# Patient Record
Sex: MALE | Race: WHITE | Employment: OTHER | URBAN - NONMETROPOLITAN AREA
[De-identification: names, ages, dates, MRNs, and addresses within clinical notes are randomized per-mention and may not be internally consistent; named-entity substitution may affect disease eponyms.]

---

## 2017-01-24 ENCOUNTER — OFFICE VISIT (OUTPATIENT)
Dept: PULMONOLOGY | Age: 62
End: 2017-01-24

## 2017-01-24 VITALS
BODY MASS INDEX: 33.44 KG/M2 | HEART RATE: 67 BPM | OXYGEN SATURATION: 97 % | HEIGHT: 69 IN | TEMPERATURE: 97.9 F | WEIGHT: 225.8 LBS | SYSTOLIC BLOOD PRESSURE: 114 MMHG | DIASTOLIC BLOOD PRESSURE: 76 MMHG

## 2017-01-24 DIAGNOSIS — J43.2 CENTRILOBULAR EMPHYSEMA (HCC): Primary | ICD-10-CM

## 2017-01-24 DIAGNOSIS — C34.91 SQUAMOUS CELL CARCINOMA OF RIGHT LUNG (HCC): ICD-10-CM

## 2017-01-24 PROCEDURE — 99213 OFFICE O/P EST LOW 20 MIN: CPT | Performed by: PHYSICIAN ASSISTANT

## 2017-01-24 RX ORDER — FLUTICASONE FUROATE AND VILANTEROL 200; 25 UG/1; UG/1
1 POWDER RESPIRATORY (INHALATION) DAILY
Qty: 1 EACH | Refills: 11 | Status: SHIPPED | OUTPATIENT
Start: 2017-01-24 | End: 2017-04-06

## 2017-01-24 ASSESSMENT — ENCOUNTER SYMPTOMS
COUGH: 0
GASTROINTESTINAL NEGATIVE: 1
EYES NEGATIVE: 1
SHORTNESS OF BREATH: 1

## 2017-02-10 ENCOUNTER — OFFICE VISIT (OUTPATIENT)
Dept: FAMILY MEDICINE CLINIC | Age: 62
End: 2017-02-10

## 2017-02-10 VITALS
OXYGEN SATURATION: 96 % | WEIGHT: 227.6 LBS | RESPIRATION RATE: 16 BRPM | TEMPERATURE: 97.5 F | HEIGHT: 67 IN | HEART RATE: 64 BPM | BODY MASS INDEX: 35.72 KG/M2 | DIASTOLIC BLOOD PRESSURE: 86 MMHG | SYSTOLIC BLOOD PRESSURE: 136 MMHG

## 2017-02-10 DIAGNOSIS — I10 ESSENTIAL HYPERTENSION: ICD-10-CM

## 2017-02-10 DIAGNOSIS — L30.9 DERMATITIS: Primary | ICD-10-CM

## 2017-02-10 DIAGNOSIS — L85.3 XEROSIS OF SKIN: ICD-10-CM

## 2017-02-10 DIAGNOSIS — J44.9 CHRONIC OBSTRUCTIVE PULMONARY DISEASE, UNSPECIFIED COPD TYPE (HCC): ICD-10-CM

## 2017-02-10 PROCEDURE — 99213 OFFICE O/P EST LOW 20 MIN: CPT | Performed by: FAMILY MEDICINE

## 2017-02-10 PROCEDURE — 96372 THER/PROPH/DIAG INJ SC/IM: CPT | Performed by: FAMILY MEDICINE

## 2017-02-10 RX ORDER — LISINOPRIL 10 MG/1
10 TABLET ORAL DAILY
Qty: 30 TABLET | Refills: 11 | Status: SHIPPED | OUTPATIENT
Start: 2017-02-10 | End: 2018-02-20 | Stop reason: SDUPTHER

## 2017-02-10 RX ORDER — TIOTROPIUM BROMIDE INHALATION SPRAY 3.12 UG/1
SPRAY, METERED RESPIRATORY (INHALATION)
Qty: 1 INHALER | Refills: 11 | Status: SHIPPED | OUTPATIENT
Start: 2017-02-10 | End: 2018-02-20 | Stop reason: SDUPTHER

## 2017-02-10 RX ORDER — METHYLPREDNISOLONE ACETATE 40 MG/ML
40 INJECTION, SUSPENSION INTRA-ARTICULAR; INTRALESIONAL; INTRAMUSCULAR; SOFT TISSUE ONCE
Status: COMPLETED | OUTPATIENT
Start: 2017-02-10 | End: 2017-02-10

## 2017-02-10 RX ORDER — ALBUTEROL SULFATE 90 UG/1
2 AEROSOL, METERED RESPIRATORY (INHALATION) EVERY 6 HOURS PRN
Qty: 1 INHALER | Refills: 5 | Status: SHIPPED | OUTPATIENT
Start: 2017-02-10 | End: 2018-02-20 | Stop reason: SDUPTHER

## 2017-02-10 RX ORDER — LISINOPRIL 10 MG/1
10 TABLET ORAL DAILY
Qty: 30 TABLET | Refills: 11 | Status: CANCELLED | OUTPATIENT
Start: 2017-02-10

## 2017-02-10 RX ORDER — FLUOCINONIDE 0.5 MG/G
OINTMENT TOPICAL
Qty: 30 G | Refills: 1 | Status: SHIPPED | OUTPATIENT
Start: 2017-02-10 | End: 2018-02-20 | Stop reason: SDUPTHER

## 2017-02-10 RX ORDER — METHYLPREDNISOLONE ACETATE 80 MG/ML
80 INJECTION, SUSPENSION INTRA-ARTICULAR; INTRALESIONAL; INTRAMUSCULAR; SOFT TISSUE ONCE
Status: COMPLETED | OUTPATIENT
Start: 2017-02-10 | End: 2017-02-10

## 2017-02-10 RX ADMIN — METHYLPREDNISOLONE ACETATE 40 MG: 40 INJECTION, SUSPENSION INTRA-ARTICULAR; INTRALESIONAL; INTRAMUSCULAR; SOFT TISSUE at 11:40

## 2017-02-10 RX ADMIN — METHYLPREDNISOLONE ACETATE 80 MG: 80 INJECTION, SUSPENSION INTRA-ARTICULAR; INTRALESIONAL; INTRAMUSCULAR; SOFT TISSUE at 11:41

## 2017-02-10 ASSESSMENT — ENCOUNTER SYMPTOMS
RESPIRATORY NEGATIVE: 1
GASTROINTESTINAL NEGATIVE: 1

## 2017-04-06 ENCOUNTER — OFFICE VISIT (OUTPATIENT)
Dept: PULMONOLOGY | Age: 62
End: 2017-04-06

## 2017-04-06 VITALS
WEIGHT: 233 LBS | HEART RATE: 66 BPM | BODY MASS INDEX: 35.31 KG/M2 | SYSTOLIC BLOOD PRESSURE: 124 MMHG | TEMPERATURE: 97.1 F | OXYGEN SATURATION: 97 % | HEIGHT: 68 IN | DIASTOLIC BLOOD PRESSURE: 82 MMHG

## 2017-04-06 DIAGNOSIS — C34.90 NON-SMALL CELL LUNG CANCER (NSCLC) (HCC): ICD-10-CM

## 2017-04-06 DIAGNOSIS — J44.9 CHRONIC OBSTRUCTIVE PULMONARY DISEASE, UNSPECIFIED COPD TYPE (HCC): Primary | ICD-10-CM

## 2017-04-06 PROCEDURE — 99213 OFFICE O/P EST LOW 20 MIN: CPT | Performed by: INTERNAL MEDICINE

## 2017-09-11 ENCOUNTER — HOSPITAL ENCOUNTER (OUTPATIENT)
Dept: CT IMAGING | Age: 62
Discharge: HOME | End: 2017-09-11
Payer: COMMERCIAL

## 2017-09-11 ENCOUNTER — HOSPITAL ENCOUNTER (OUTPATIENT)
Age: 62
Discharge: HOME | End: 2017-09-11
Payer: COMMERCIAL

## 2017-09-11 DIAGNOSIS — C34.11 MALIGNANT NEOPLASM OF UPPER LOBE OF RIGHT LUNG (HCC): ICD-10-CM

## 2017-09-11 LAB
ALBUMIN SERPL-MCNC: 4.6 G/DL (ref 3.5–5.1)
ALP BLD-CCNC: 82 U/L (ref 38–126)
ALT SERPL-CCNC: 57 U/L (ref 11–66)
ANION GAP SERPL CALCULATED.3IONS-SCNC: 13 MEQ/L (ref 8–16)
ANISOCYTOSIS: ABNORMAL
AST SERPL-CCNC: 41 U/L (ref 5–40)
BASOPHILS # BLD: 0.6 %
BASOPHILS ABSOLUTE: 0 THOU/MM3 (ref 0–0.1)
BILIRUB SERPL-MCNC: 0.6 MG/DL (ref 0.3–1.2)
BUN BLDV-MCNC: 8 MG/DL (ref 7–22)
CALCIUM SERPL-MCNC: 9.8 MG/DL (ref 8.5–10.5)
CEA: 2.3 NG/ML (ref 0–5)
CHLORIDE BLD-SCNC: 99 MEQ/L (ref 98–111)
CO2: 28 MEQ/L (ref 23–33)
CREAT SERPL-MCNC: 1.2 MG/DL (ref 0.4–1.2)
EOSINOPHIL # BLD: 1.6 %
EOSINOPHILS ABSOLUTE: 0.1 THOU/MM3 (ref 0–0.4)
GFR SERPL CREATININE-BSD FRML MDRD: 62 ML/MIN/1.73M2
GLUCOSE BLD-MCNC: 133 MG/DL (ref 70–108)
HCT VFR BLD CALC: 51.4 % (ref 42–52)
HEMOGLOBIN: 17.4 GM/DL (ref 14–18)
LYMPHOCYTES # BLD: 31.9 %
LYMPHOCYTES ABSOLUTE: 2.1 THOU/MM3 (ref 1–4.8)
MACROCYTES: ABNORMAL
MCH RBC QN AUTO: 34 PG (ref 27–31)
MCHC RBC AUTO-ENTMCNC: 33.9 GM/DL (ref 33–37)
MCV RBC AUTO: 100.3 FL (ref 80–94)
MONOCYTES # BLD: 8.6 %
MONOCYTES ABSOLUTE: 0.6 THOU/MM3 (ref 0.4–1.3)
NUCLEATED RED BLOOD CELLS: 0 /100 WBC
PDW BLD-RTO: 14.6 % (ref 11.5–14.5)
PLATELET # BLD: 225 THOU/MM3 (ref 130–400)
PMV BLD AUTO: 7.5 MCM (ref 7.4–10.4)
POTASSIUM SERPL-SCNC: 4.8 MEQ/L (ref 3.5–5.2)
RBC # BLD: 5.12 MILL/MM3 (ref 4.7–6.1)
RBC # BLD: NORMAL 10*6/UL
SEG NEUTROPHILS: 57.3 %
SEGMENTED NEUTROPHILS ABSOLUTE COUNT: 3.8 THOU/MM3 (ref 1.8–7.7)
SODIUM BLD-SCNC: 140 MEQ/L (ref 135–145)
TOTAL PROTEIN: 7.5 G/DL (ref 6.1–8)
WBC # BLD: 6.7 THOU/MM3 (ref 4.8–10.8)

## 2017-09-11 PROCEDURE — 85025 COMPLETE CBC W/AUTO DIFF WBC: CPT

## 2017-09-11 PROCEDURE — 6360000004 HC RX CONTRAST MEDICATION: Performed by: INTERNAL MEDICINE

## 2017-09-11 PROCEDURE — 82378 CARCINOEMBRYONIC ANTIGEN: CPT

## 2017-09-11 PROCEDURE — 80053 COMPREHEN METABOLIC PANEL: CPT

## 2017-09-11 PROCEDURE — 71260 CT THORAX DX C+: CPT

## 2017-09-11 PROCEDURE — 36415 COLL VENOUS BLD VENIPUNCTURE: CPT

## 2017-09-11 RX ADMIN — IOPAMIDOL 85 ML: 755 INJECTION, SOLUTION INTRAVENOUS at 09:28

## 2017-09-19 ENCOUNTER — TELEPHONE (OUTPATIENT)
Dept: FAMILY MEDICINE CLINIC | Age: 62
End: 2017-09-19

## 2017-09-19 DIAGNOSIS — M10.9 ACUTE GOUT, UNSPECIFIED CAUSE, UNSPECIFIED SITE: ICD-10-CM

## 2017-09-19 RX ORDER — INDOMETHACIN 50 MG/1
50 CAPSULE ORAL 3 TIMES DAILY PRN
Qty: 30 CAPSULE | Refills: 0 | Status: SHIPPED | OUTPATIENT
Start: 2017-09-19 | End: 2018-05-10 | Stop reason: SDUPTHER

## 2018-02-20 ENCOUNTER — OFFICE VISIT (OUTPATIENT)
Dept: FAMILY MEDICINE CLINIC | Age: 63
End: 2018-02-20
Payer: COMMERCIAL

## 2018-02-20 VITALS
HEIGHT: 69 IN | RESPIRATION RATE: 14 BRPM | HEART RATE: 82 BPM | WEIGHT: 224 LBS | SYSTOLIC BLOOD PRESSURE: 112 MMHG | DIASTOLIC BLOOD PRESSURE: 82 MMHG | BODY MASS INDEX: 33.18 KG/M2 | OXYGEN SATURATION: 96 %

## 2018-02-20 DIAGNOSIS — J44.9 CHRONIC OBSTRUCTIVE PULMONARY DISEASE, UNSPECIFIED COPD TYPE (HCC): ICD-10-CM

## 2018-02-20 DIAGNOSIS — E78.6 LOW HDL (UNDER 40): ICD-10-CM

## 2018-02-20 DIAGNOSIS — Z12.5 SCREENING FOR PROSTATE CANCER: ICD-10-CM

## 2018-02-20 DIAGNOSIS — I10 ESSENTIAL HYPERTENSION: ICD-10-CM

## 2018-02-20 DIAGNOSIS — F41.8 SITUATIONAL ANXIETY: ICD-10-CM

## 2018-02-20 DIAGNOSIS — C34.91 SQUAMOUS CELL CARCINOMA OF RIGHT LUNG (HCC): ICD-10-CM

## 2018-02-20 DIAGNOSIS — L30.9 DERMATITIS: ICD-10-CM

## 2018-02-20 DIAGNOSIS — R73.01 IFG (IMPAIRED FASTING GLUCOSE): Primary | ICD-10-CM

## 2018-02-20 PROCEDURE — 99214 OFFICE O/P EST MOD 30 MIN: CPT | Performed by: FAMILY MEDICINE

## 2018-02-20 RX ORDER — LISINOPRIL 10 MG/1
10 TABLET ORAL DAILY
Qty: 30 TABLET | Refills: 11 | Status: SHIPPED | OUTPATIENT
Start: 2018-02-20 | End: 2019-02-12 | Stop reason: SDUPTHER

## 2018-02-20 RX ORDER — FLUOCINONIDE 0.5 MG/G
OINTMENT TOPICAL
Qty: 30 G | Refills: 1 | Status: SHIPPED | OUTPATIENT
Start: 2018-02-20 | End: 2018-02-27

## 2018-02-20 RX ORDER — TIOTROPIUM BROMIDE INHALATION SPRAY 3.12 UG/1
SPRAY, METERED RESPIRATORY (INHALATION)
Qty: 1 INHALER | Refills: 11 | Status: SHIPPED | OUTPATIENT
Start: 2018-02-20 | End: 2019-02-12 | Stop reason: SDUPTHER

## 2018-02-20 RX ORDER — ALBUTEROL SULFATE 90 UG/1
2 AEROSOL, METERED RESPIRATORY (INHALATION) EVERY 6 HOURS PRN
Qty: 1 INHALER | Refills: 5 | Status: SHIPPED | OUTPATIENT
Start: 2018-02-20 | End: 2019-03-21 | Stop reason: SDUPTHER

## 2018-02-20 ASSESSMENT — ENCOUNTER SYMPTOMS
RESPIRATORY NEGATIVE: 1
GASTROINTESTINAL NEGATIVE: 1

## 2018-02-20 ASSESSMENT — PATIENT HEALTH QUESTIONNAIRE - PHQ9
SUM OF ALL RESPONSES TO PHQ9 QUESTIONS 1 & 2: 0
SUM OF ALL RESPONSES TO PHQ QUESTIONS 1-9: 0
1. LITTLE INTEREST OR PLEASURE IN DOING THINGS: 0
2. FEELING DOWN, DEPRESSED OR HOPELESS: 0

## 2018-02-20 NOTE — PROGRESS NOTES
Visit Information    Have you changed or started any medications since your last visit including any over-the-counter medicines, vitamins, or herbal medicines? no   Have you stopped taking any of your medications? Is so, why? -  no  Are you having any side effects from any of your medications? - no    Have you seen any other physician or provider since your last visit? yes - Dr Shahid   Have you had any other diagnostic tests since your last visit? yes -  CT of chest, labs   Have you been seen in the emergency room and/or had an admission in a hospital since we last saw you?  no   Have you had your routine dental cleaning in the past 6 months?  no dentures    Do you have an active MyChart account? If no, what is the barrier?   No: pt refused    Patient Care Team:  Serina Donovan DO as PCP - General (Family Medicine)  Jonathan Negron MD as Consulting Physician (Pulmonary Disease)  Dominga Tang MD as Consulting Physician (Cardiology)  Hema Lopes MD as Surgeon (Cardiothoracic Surgery)  Salma Díaz DO as Physician (Hematology and Oncology)    Medical History Review  Past Medical, Family, and Social History reviewed and does contribute to the patient presenting condition    Health Maintenance   Topic Date Due    Hepatitis C screen  1955    HIV screen  09/26/1972    DTaP/Tdap/Td vaccine (1 - Tdap) 09/26/1976    Pneumococcal med risk (1 of 1 - PPSV23) 09/26/1976    A1C test (Diabetic or Prediabetic)  02/27/2015    Flu vaccine (1) 09/01/2017    Zostavax vaccine  09/26/2017    Lipid screen  02/06/2021    Colon cancer screen colonoscopy  04/02/2025

## 2018-03-12 ENCOUNTER — HOSPITAL ENCOUNTER (OUTPATIENT)
Dept: CT IMAGING | Age: 63
Discharge: HOME | End: 2018-03-12
Payer: COMMERCIAL

## 2018-03-12 ENCOUNTER — HOSPITAL ENCOUNTER (OUTPATIENT)
Age: 63
Discharge: HOME | End: 2018-03-12
Payer: COMMERCIAL

## 2018-03-12 DIAGNOSIS — C34.11 MALIGNANT NEOPLASM OF UPPER LOBE OF RIGHT LUNG (HCC): ICD-10-CM

## 2018-03-12 LAB
ALBUMIN SERPL-MCNC: 4.3 G/DL (ref 3.5–5.1)
ALP BLD-CCNC: 91 U/L (ref 38–126)
ALT SERPL-CCNC: 34 U/L (ref 11–66)
ANION GAP SERPL CALCULATED.3IONS-SCNC: 15 MEQ/L (ref 8–16)
AST SERPL-CCNC: 28 U/L (ref 5–40)
BASOPHILS # BLD: 1 %
BASOPHILS ABSOLUTE: 0.1 THOU/MM3 (ref 0–0.1)
BILIRUB SERPL-MCNC: 0.5 MG/DL (ref 0.3–1.2)
BUN BLDV-MCNC: 8 MG/DL (ref 7–22)
CALCIUM SERPL-MCNC: 9.8 MG/DL (ref 8.5–10.5)
CEA: 2.6 NG/ML (ref 0–5)
CHLORIDE BLD-SCNC: 100 MEQ/L (ref 98–111)
CO2: 24 MEQ/L (ref 23–33)
CREAT SERPL-MCNC: 1.1 MG/DL (ref 0.4–1.2)
EOSINOPHIL # BLD: 2.2 %
EOSINOPHILS ABSOLUTE: 0.2 THOU/MM3 (ref 0–0.4)
GFR SERPL CREATININE-BSD FRML MDRD: 68 ML/MIN/1.73M2
GLUCOSE BLD-MCNC: 136 MG/DL (ref 70–108)
HCT VFR BLD CALC: 48.2 % (ref 42–52)
HEMOGLOBIN: 16.6 GM/DL (ref 14–18)
LYMPHOCYTES # BLD: 29.2 %
LYMPHOCYTES ABSOLUTE: 2.4 THOU/MM3 (ref 1–4.8)
MCH RBC QN AUTO: 33.9 PG (ref 27–31)
MCHC RBC AUTO-ENTMCNC: 34.4 GM/DL (ref 33–37)
MCV RBC AUTO: 98.5 FL (ref 80–94)
MONOCYTES # BLD: 9.1 %
MONOCYTES ABSOLUTE: 0.8 THOU/MM3 (ref 0.4–1.3)
NUCLEATED RED BLOOD CELLS: 0 /100 WBC
PDW BLD-RTO: 14.1 % (ref 11.5–14.5)
PLATELET # BLD: 233 THOU/MM3 (ref 130–400)
PMV BLD AUTO: 7.9 FL (ref 7.4–10.4)
POC CREATININE WHOLE BLOOD: 1.2 MG/DL (ref 0.5–1.2)
POTASSIUM SERPL-SCNC: 4.3 MEQ/L (ref 3.5–5.2)
RBC # BLD: 4.89 MILL/MM3 (ref 4.7–6.1)
SEG NEUTROPHILS: 58.5 %
SEGMENTED NEUTROPHILS ABSOLUTE COUNT: 4.9 THOU/MM3 (ref 1.8–7.7)
SODIUM BLD-SCNC: 139 MEQ/L (ref 135–145)
TOTAL PROTEIN: 7.7 G/DL (ref 6.1–8)
WBC # BLD: 8.3 THOU/MM3 (ref 4.8–10.8)

## 2018-03-12 PROCEDURE — 6360000004 HC RX CONTRAST MEDICATION: Performed by: INTERNAL MEDICINE

## 2018-03-12 PROCEDURE — 82565 ASSAY OF CREATININE: CPT

## 2018-03-12 PROCEDURE — 82378 CARCINOEMBRYONIC ANTIGEN: CPT

## 2018-03-12 PROCEDURE — 85025 COMPLETE CBC W/AUTO DIFF WBC: CPT

## 2018-03-12 PROCEDURE — 36415 COLL VENOUS BLD VENIPUNCTURE: CPT

## 2018-03-12 PROCEDURE — 80053 COMPREHEN METABOLIC PANEL: CPT

## 2018-03-12 PROCEDURE — 71260 CT THORAX DX C+: CPT

## 2018-03-12 RX ADMIN — IOPAMIDOL 85 ML: 755 INJECTION, SOLUTION INTRAVENOUS at 07:12

## 2018-04-05 ENCOUNTER — HOSPITAL ENCOUNTER (OUTPATIENT)
Dept: PULMONOLOGY | Age: 63
Discharge: HOME | End: 2018-04-05
Payer: COMMERCIAL

## 2018-04-05 DIAGNOSIS — J44.9 CHRONIC OBSTRUCTIVE PULMONARY DISEASE, UNSPECIFIED COPD TYPE (HCC): ICD-10-CM

## 2018-04-05 DIAGNOSIS — C34.11 MALIGNANT NEOPLASM OF UPPER LOBE, RIGHT BRONCHUS OR LUNG (HCC): ICD-10-CM

## 2018-04-05 PROCEDURE — 94060 EVALUATION OF WHEEZING: CPT

## 2018-04-06 LAB
AVERAGE GLUCOSE: NORMAL
CHOLESTEROL, TOTAL: 208 MG/DL
CHOLESTEROL/HDL RATIO: ABNORMAL
HBA1C MFR BLD: 5.8 %
HDLC SERPL-MCNC: 30 MG/DL (ref 35–70)
LDL CHOLESTEROL CALCULATED: 134 MG/DL (ref 0–160)
TRIGL SERPL-MCNC: 218 MG/DL
TSH SERPL DL<=0.05 MIU/L-ACNC: 2.71 UIU/ML
VLDLC SERPL CALC-MCNC: 44 MG/DL

## 2018-04-12 ENCOUNTER — OFFICE VISIT (OUTPATIENT)
Dept: PULMONOLOGY | Age: 63
End: 2018-04-12
Payer: COMMERCIAL

## 2018-04-12 VITALS
OXYGEN SATURATION: 95 % | DIASTOLIC BLOOD PRESSURE: 70 MMHG | WEIGHT: 220 LBS | TEMPERATURE: 98 F | HEART RATE: 64 BPM | HEIGHT: 69 IN | BODY MASS INDEX: 32.58 KG/M2 | SYSTOLIC BLOOD PRESSURE: 106 MMHG

## 2018-04-12 DIAGNOSIS — C34.91 NON-SMALL CELL CANCER OF RIGHT LUNG (HCC): ICD-10-CM

## 2018-04-12 DIAGNOSIS — J44.9 CHRONIC OBSTRUCTIVE PULMONARY DISEASE, UNSPECIFIED COPD TYPE (HCC): Primary | ICD-10-CM

## 2018-04-12 PROCEDURE — 99213 OFFICE O/P EST LOW 20 MIN: CPT | Performed by: INTERNAL MEDICINE

## 2018-04-18 ENCOUNTER — OFFICE VISIT (OUTPATIENT)
Dept: FAMILY MEDICINE CLINIC | Age: 63
End: 2018-04-18
Payer: COMMERCIAL

## 2018-04-18 VITALS
OXYGEN SATURATION: 97 % | WEIGHT: 221.2 LBS | TEMPERATURE: 97.7 F | HEIGHT: 69 IN | SYSTOLIC BLOOD PRESSURE: 118 MMHG | DIASTOLIC BLOOD PRESSURE: 78 MMHG | BODY MASS INDEX: 32.76 KG/M2 | RESPIRATION RATE: 13 BRPM | HEART RATE: 72 BPM

## 2018-04-18 DIAGNOSIS — I10 ESSENTIAL HYPERTENSION: ICD-10-CM

## 2018-04-18 DIAGNOSIS — M25.432 PAIN AND SWELLING OF LEFT WRIST: Primary | ICD-10-CM

## 2018-04-18 DIAGNOSIS — E78.49 OTHER HYPERLIPIDEMIA: ICD-10-CM

## 2018-04-18 DIAGNOSIS — J44.9 CHRONIC OBSTRUCTIVE PULMONARY DISEASE, UNSPECIFIED COPD TYPE (HCC): ICD-10-CM

## 2018-04-18 DIAGNOSIS — E78.6 LOW HDL (UNDER 40): ICD-10-CM

## 2018-04-18 DIAGNOSIS — M10.032 ACUTE IDIOPATHIC GOUT OF LEFT WRIST: ICD-10-CM

## 2018-04-18 DIAGNOSIS — M25.532 PAIN AND SWELLING OF LEFT WRIST: Primary | ICD-10-CM

## 2018-04-18 DIAGNOSIS — R73.01 IFG (IMPAIRED FASTING GLUCOSE): ICD-10-CM

## 2018-04-18 PROCEDURE — 99214 OFFICE O/P EST MOD 30 MIN: CPT | Performed by: FAMILY MEDICINE

## 2018-04-18 RX ORDER — METHYLPREDNISOLONE 4 MG/1
TABLET ORAL
Qty: 1 KIT | Refills: 0 | Status: SHIPPED | OUTPATIENT
Start: 2018-04-18 | End: 2018-04-24

## 2018-04-18 RX ORDER — ATORVASTATIN CALCIUM 20 MG/1
20 TABLET, FILM COATED ORAL NIGHTLY
Qty: 30 TABLET | Refills: 2 | Status: SHIPPED | OUTPATIENT
Start: 2018-04-18 | End: 2018-07-18 | Stop reason: SDUPTHER

## 2018-04-18 ASSESSMENT — ENCOUNTER SYMPTOMS
GASTROINTESTINAL NEGATIVE: 1
RESPIRATORY NEGATIVE: 1

## 2018-05-10 DIAGNOSIS — M10.9 ACUTE GOUT, UNSPECIFIED CAUSE, UNSPECIFIED SITE: ICD-10-CM

## 2018-05-10 RX ORDER — INDOMETHACIN 50 MG/1
50 CAPSULE ORAL 3 TIMES DAILY PRN
Qty: 30 CAPSULE | Refills: 0 | Status: SHIPPED | OUTPATIENT
Start: 2018-05-10 | End: 2019-03-21 | Stop reason: ALTCHOICE

## 2018-06-12 LAB
CHOLESTEROL/HDL RATIO: 4
CHOLESTEROL: 129 MG/DL
HDLC SERPL-MCNC: 32 MG/DL
LDL CHOLESTEROL CALCULATED: 66 MG/DL
LDL/HDL RATIO: 2.1
TRIGL SERPL-MCNC: 154 MG/DL
URIC ACID: 9.5 MG/DL (ref 3.7–8)
VLDLC SERPL CALC-MCNC: 31 MG/DL

## 2018-07-18 ENCOUNTER — OFFICE VISIT (OUTPATIENT)
Dept: FAMILY MEDICINE CLINIC | Age: 63
End: 2018-07-18
Payer: COMMERCIAL

## 2018-07-18 VITALS
WEIGHT: 213.6 LBS | BODY MASS INDEX: 32.37 KG/M2 | HEART RATE: 56 BPM | HEIGHT: 68 IN | DIASTOLIC BLOOD PRESSURE: 66 MMHG | SYSTOLIC BLOOD PRESSURE: 112 MMHG | OXYGEN SATURATION: 97 % | RESPIRATION RATE: 12 BRPM

## 2018-07-18 DIAGNOSIS — J44.9 CHRONIC OBSTRUCTIVE PULMONARY DISEASE, UNSPECIFIED COPD TYPE (HCC): ICD-10-CM

## 2018-07-18 DIAGNOSIS — F41.8 SITUATIONAL ANXIETY: ICD-10-CM

## 2018-07-18 DIAGNOSIS — E78.6 LOW HDL (UNDER 40): ICD-10-CM

## 2018-07-18 DIAGNOSIS — I10 ESSENTIAL HYPERTENSION: ICD-10-CM

## 2018-07-18 DIAGNOSIS — R73.01 IFG (IMPAIRED FASTING GLUCOSE): ICD-10-CM

## 2018-07-18 DIAGNOSIS — E78.49 OTHER HYPERLIPIDEMIA: Primary | ICD-10-CM

## 2018-07-18 DIAGNOSIS — M10.00 IDIOPATHIC GOUT, UNSPECIFIED CHRONICITY, UNSPECIFIED SITE: ICD-10-CM

## 2018-07-18 PROCEDURE — 99214 OFFICE O/P EST MOD 30 MIN: CPT | Performed by: FAMILY MEDICINE

## 2018-07-18 RX ORDER — ALLOPURINOL 100 MG/1
100 TABLET ORAL DAILY
Qty: 30 TABLET | Refills: 11 | Status: SHIPPED | OUTPATIENT
Start: 2018-07-18 | End: 2019-07-06 | Stop reason: SDUPTHER

## 2018-07-18 RX ORDER — PREDNISONE 20 MG/1
20 TABLET ORAL DAILY
Qty: 10 TABLET | Refills: 0 | Status: SHIPPED | OUTPATIENT
Start: 2018-07-18 | End: 2018-07-28

## 2018-07-18 RX ORDER — ATORVASTATIN CALCIUM 20 MG/1
20 TABLET, FILM COATED ORAL NIGHTLY
Qty: 30 TABLET | Refills: 11 | Status: SHIPPED | OUTPATIENT
Start: 2018-07-18 | End: 2019-07-07 | Stop reason: SDUPTHER

## 2018-07-18 ASSESSMENT — ENCOUNTER SYMPTOMS
GASTROINTESTINAL NEGATIVE: 1
RESPIRATORY NEGATIVE: 1

## 2018-07-18 NOTE — PROGRESS NOTES
Visit Information    Have you changed or started any medications since your last visit including any over-the-counter medicines, vitamins, or herbal medicines? no   Are you having any side effects from any of your medications? -  no  Have you stopped taking any of your medications? Is so, why? -  no    Have you seen any other physician or provider since your last visit? No  Have you had any other diagnostic tests since your last visit? Yes - Records Obtained  Have you been seen in the emergency room and/or had an admission to a hospital since we last saw you? No  Have you had your routine dental cleaning in the past 6 months? no    Have you activated your [employer] account? If not, what are your barriers?  No: does not use a computer     Patient Care Team:  Otto Angeles DO as PCP - General (Family Medicine)  Pernell Massey MD as Consulting Physician (Pulmonary Disease)  Amparo Zazueta MD as Consulting Physician (Cardiology)  Thelma Dawkins MD as Surgeon (Cardiothoracic Surgery)  Kaylee Gatica DO as Physician (Hematology and Oncology)    Medical History Review  Past Medical, Family, and Social History reviewed and does contribute to the patient presenting condition    Health Maintenance   Topic Date Due    Hepatitis C screen  1955    HIV screen  09/26/1972    DTaP/Tdap/Td vaccine (1 - Tdap) 09/26/1976    Shingles Vaccine (1 of 2 - 2 Dose Series) 09/26/2007    Flu vaccine (1) 09/01/2018    Potassium monitoring  03/12/2019    Creatinine monitoring  03/12/2019    A1C test (Diabetic or Prediabetic)  04/06/2019    Lipid screen  06/11/2023    Colon cancer screen colonoscopy  04/02/2025    Pneumococcal med risk  Completed
Respiratory: Negative. Cardiovascular: Negative. Gastrointestinal: Negative. Musculoskeletal: Positive for arthralgias and joint swelling. All other systems reviewed and are negative. Objective:   Physical Exam   Constitutional: He is oriented to person, place, and time. He appears well-developed and well-nourished. HENT:   Head: Normocephalic and atraumatic. Right Ear: Tympanic membrane normal.   Left Ear: Tympanic membrane normal.   Mouth/Throat: Oropharynx is clear and moist and mucous membranes are normal.   Neck: Carotid bruit is not present. Cardiovascular: Normal rate, regular rhythm and normal heart sounds. No murmur heard. Pulmonary/Chest: Effort normal and breath sounds normal.   Abdominal: Soft. Bowel sounds are normal.   Musculoskeletal: He exhibits no edema. Neurological: He is alert and oriented to person, place, and time. Skin: Skin is warm and dry. Psychiatric: He has a normal mood and affect. His behavior is normal.   Nursing note and vitals reviewed. Assessment:       Diagnosis Orders   1. Other hyperlipidemia  atorvastatin (LIPITOR) 20 MG tablet   2. Low HDL (under 40)     3. Essential hypertension     4. IFG (impaired fasting glucose)     5. Chronic obstructive pulmonary disease, unspecified COPD type ([facility] [address]     6. Idiopathic gout, unspecified chronicity, unspecified site  allopurinol (ZYLOPRIM) 100 MG tablet    predniSONE (DELTASONE) 20 MG tablet    Uric Acid   7.  Situational anxiety             Plan:      -  Chronic medical problems stable  -  Continue current medications  -  Follow up with specialists as scheduled  -  Labs reviewed, start Allopurinol with 10 day prednisone bridge  -  Recheck labs 3 mos  -  RTO 3 mos

## 2018-08-21 ENCOUNTER — TELEPHONE (OUTPATIENT)
Dept: FAMILY MEDICINE CLINIC | Age: 63
End: 2018-08-21

## 2018-08-21 RX ORDER — METHYLPREDNISOLONE 4 MG/1
TABLET ORAL
Qty: 1 KIT | Refills: 0 | Status: SHIPPED | OUTPATIENT
Start: 2018-08-21 | End: 2018-08-27

## 2018-08-21 NOTE — TELEPHONE ENCOUNTER
Pt still has wrist swelling & pain from gout. Also complaints of left chest/side discomfort, states it hurts to take a deep breath and eyes are watering/itching. He wants to know if symptoms could be a side effect of the gout medication. Pt is not scheduled for return appt until 10/18.  Please advise

## 2018-09-10 ENCOUNTER — HOSPITAL ENCOUNTER (OUTPATIENT)
Age: 63
Discharge: HOME | End: 2018-09-10
Payer: MEDICARE

## 2018-09-10 ENCOUNTER — HOSPITAL ENCOUNTER (OUTPATIENT)
Dept: CT IMAGING | Age: 63
Discharge: HOME | End: 2018-09-10
Payer: MEDICARE

## 2018-09-10 DIAGNOSIS — C34.11 MALIGNANT NEOPLASM OF UPPER LOBE OF RIGHT LUNG (HCC): ICD-10-CM

## 2018-09-10 LAB
ALBUMIN SERPL-MCNC: 4.6 G/DL (ref 3.5–5.1)
ALP BLD-CCNC: 111 U/L (ref 38–126)
ALT SERPL-CCNC: 33 U/L (ref 11–66)
ANION GAP SERPL CALCULATED.3IONS-SCNC: 15 MEQ/L (ref 8–16)
AST SERPL-CCNC: 25 U/L (ref 5–40)
BASOPHILS # BLD: 0.7 %
BASOPHILS ABSOLUTE: 0.1 THOU/MM3 (ref 0–0.1)
BILIRUB SERPL-MCNC: 0.6 MG/DL (ref 0.3–1.2)
BUN BLDV-MCNC: 10 MG/DL (ref 7–22)
CALCIUM SERPL-MCNC: 10 MG/DL (ref 8.5–10.5)
CEA: 2 NG/ML (ref 0–5)
CHLORIDE BLD-SCNC: 101 MEQ/L (ref 98–111)
CO2: 26 MEQ/L (ref 23–33)
CREAT SERPL-MCNC: 1.2 MG/DL (ref 0.4–1.2)
EOSINOPHIL # BLD: 4.6 %
EOSINOPHILS ABSOLUTE: 0.3 THOU/MM3 (ref 0–0.4)
ERYTHROCYTE [DISTWIDTH] IN BLOOD BY AUTOMATED COUNT: 12.9 % (ref 11.5–14.5)
ERYTHROCYTE [DISTWIDTH] IN BLOOD BY AUTOMATED COUNT: 46.2 FL (ref 35–45)
GFR SERPL CREATININE-BSD FRML MDRD: 62 ML/MIN/1.73M2
GLUCOSE BLD-MCNC: 126 MG/DL (ref 70–108)
HCT VFR BLD CALC: 42.4 % (ref 42–52)
HEMOGLOBIN: 14 GM/DL (ref 14–18)
IMMATURE GRANS (ABS): 0.03 THOU/MM3 (ref 0–0.07)
IMMATURE GRANULOCYTES: 0.4 %
LYMPHOCYTES # BLD: 25.1 %
LYMPHOCYTES ABSOLUTE: 1.9 THOU/MM3 (ref 1–4.8)
MCH RBC QN AUTO: 32.2 PG (ref 26–33)
MCHC RBC AUTO-ENTMCNC: 33 GM/DL (ref 32.2–35.5)
MCV RBC AUTO: 97.5 FL (ref 80–94)
MONOCYTES # BLD: 8.8 %
MONOCYTES ABSOLUTE: 0.7 THOU/MM3 (ref 0.4–1.3)
NUCLEATED RED BLOOD CELLS: 0 /100 WBC
PLATELET # BLD: 249 THOU/MM3 (ref 130–400)
PMV BLD AUTO: 9.5 FL (ref 9.4–12.4)
POC CREATININE WHOLE BLOOD: 1.1 MG/DL (ref 0.5–1.2)
POTASSIUM SERPL-SCNC: 4.4 MEQ/L (ref 3.5–5.2)
RBC # BLD: 4.35 MILL/MM3 (ref 4.7–6.1)
SEG NEUTROPHILS: 60.4 %
SEGMENTED NEUTROPHILS ABSOLUTE COUNT: 4.6 THOU/MM3 (ref 1.8–7.7)
SODIUM BLD-SCNC: 142 MEQ/L (ref 135–145)
TOTAL PROTEIN: 8.1 G/DL (ref 6.1–8)
WBC # BLD: 7.6 THOU/MM3 (ref 4.8–10.8)

## 2018-09-10 PROCEDURE — 71260 CT THORAX DX C+: CPT

## 2018-09-10 PROCEDURE — 85025 COMPLETE CBC W/AUTO DIFF WBC: CPT

## 2018-09-10 PROCEDURE — 6360000004 HC RX CONTRAST MEDICATION: Performed by: INTERNAL MEDICINE

## 2018-09-10 PROCEDURE — 82565 ASSAY OF CREATININE: CPT

## 2018-09-10 PROCEDURE — 36415 COLL VENOUS BLD VENIPUNCTURE: CPT

## 2018-09-10 PROCEDURE — 82378 CARCINOEMBRYONIC ANTIGEN: CPT

## 2018-09-10 PROCEDURE — 80053 COMPREHEN METABOLIC PANEL: CPT

## 2018-09-10 RX ADMIN — IOPAMIDOL 85 ML: 755 INJECTION, SOLUTION INTRAVENOUS at 09:42

## 2018-10-04 LAB — URIC ACID: 6.6 MG/DL (ref 3.7–8)

## 2018-10-18 ENCOUNTER — OFFICE VISIT (OUTPATIENT)
Dept: FAMILY MEDICINE CLINIC | Age: 63
End: 2018-10-18
Payer: MEDICARE

## 2018-10-18 VITALS
HEIGHT: 69 IN | BODY MASS INDEX: 32.11 KG/M2 | WEIGHT: 216.8 LBS | SYSTOLIC BLOOD PRESSURE: 104 MMHG | RESPIRATION RATE: 20 BRPM | DIASTOLIC BLOOD PRESSURE: 58 MMHG | HEART RATE: 56 BPM

## 2018-10-18 DIAGNOSIS — I10 ESSENTIAL HYPERTENSION: ICD-10-CM

## 2018-10-18 DIAGNOSIS — M10.00 IDIOPATHIC GOUT, UNSPECIFIED CHRONICITY, UNSPECIFIED SITE: ICD-10-CM

## 2018-10-18 DIAGNOSIS — E78.6 LOW HDL (UNDER 40): ICD-10-CM

## 2018-10-18 DIAGNOSIS — J44.9 CHRONIC OBSTRUCTIVE PULMONARY DISEASE, UNSPECIFIED COPD TYPE (HCC): ICD-10-CM

## 2018-10-18 DIAGNOSIS — G57.93 NEUROPATHY INVOLVING BOTH LOWER EXTREMITIES: Primary | ICD-10-CM

## 2018-10-18 DIAGNOSIS — R73.01 IFG (IMPAIRED FASTING GLUCOSE): ICD-10-CM

## 2018-10-18 PROCEDURE — 99214 OFFICE O/P EST MOD 30 MIN: CPT | Performed by: FAMILY MEDICINE

## 2018-10-18 ASSESSMENT — ENCOUNTER SYMPTOMS
GASTROINTESTINAL NEGATIVE: 1
RESPIRATORY NEGATIVE: 1

## 2019-02-12 DIAGNOSIS — I10 ESSENTIAL HYPERTENSION: ICD-10-CM

## 2019-02-12 DIAGNOSIS — J44.9 CHRONIC OBSTRUCTIVE PULMONARY DISEASE, UNSPECIFIED COPD TYPE (HCC): ICD-10-CM

## 2019-02-12 RX ORDER — TIOTROPIUM BROMIDE INHALATION SPRAY 3.12 UG/1
SPRAY, METERED RESPIRATORY (INHALATION)
Qty: 1 INHALER | Refills: 11 | Status: SHIPPED | OUTPATIENT
Start: 2019-02-12 | End: 2019-04-03 | Stop reason: ALTCHOICE

## 2019-02-12 RX ORDER — LISINOPRIL 10 MG/1
10 TABLET ORAL DAILY
Qty: 30 TABLET | Refills: 11 | Status: SHIPPED | OUTPATIENT
Start: 2019-02-12 | End: 2020-02-07

## 2019-03-11 ENCOUNTER — HOSPITAL ENCOUNTER (OUTPATIENT)
Age: 64
Discharge: HOME | End: 2019-03-11
Payer: MEDICARE

## 2019-03-11 ENCOUNTER — HOSPITAL ENCOUNTER (OUTPATIENT)
Dept: CT IMAGING | Age: 64
Discharge: HOME | End: 2019-03-11
Payer: MEDICARE

## 2019-03-11 DIAGNOSIS — C34.11 MALIGNANT NEOPLASM OF UPPER LOBE, RIGHT BRONCHUS OR LUNG (HCC): ICD-10-CM

## 2019-03-11 LAB
ALBUMIN SERPL-MCNC: 4.4 G/DL (ref 3.5–5.1)
ALP BLD-CCNC: 114 U/L (ref 38–126)
ALT SERPL-CCNC: 34 U/L (ref 11–66)
ANION GAP SERPL CALCULATED.3IONS-SCNC: 12 MEQ/L (ref 8–16)
AST SERPL-CCNC: 26 U/L (ref 5–40)
BASOPHILS # BLD: 1.1 %
BASOPHILS ABSOLUTE: 0.1 THOU/MM3 (ref 0–0.1)
BILIRUB SERPL-MCNC: 0.3 MG/DL (ref 0.3–1.2)
BUN BLDV-MCNC: 7 MG/DL (ref 7–22)
CALCIUM SERPL-MCNC: 9.7 MG/DL (ref 8.5–10.5)
CEA: 2.4 NG/ML (ref 0–5)
CHLORIDE BLD-SCNC: 101 MEQ/L (ref 98–111)
CO2: 27 MEQ/L (ref 23–33)
CREAT SERPL-MCNC: 1 MG/DL (ref 0.4–1.2)
EOSINOPHIL # BLD: 6.8 %
EOSINOPHILS ABSOLUTE: 0.4 THOU/MM3 (ref 0–0.4)
ERYTHROCYTE [DISTWIDTH] IN BLOOD BY AUTOMATED COUNT: 13.4 % (ref 11.5–14.5)
ERYTHROCYTE [DISTWIDTH] IN BLOOD BY AUTOMATED COUNT: 44.1 FL (ref 35–45)
GFR SERPL CREATININE-BSD FRML MDRD: 76 ML/MIN/1.73M2
GLUCOSE BLD-MCNC: 159 MG/DL (ref 70–108)
HCT VFR BLD CALC: 42.5 % (ref 42–52)
HEMOGLOBIN: 14.1 GM/DL (ref 14–18)
IMMATURE GRANS (ABS): 0.03 THOU/MM3 (ref 0–0.07)
IMMATURE GRANULOCYTES: 0.5 %
LYMPHOCYTES # BLD: 35 %
LYMPHOCYTES ABSOLUTE: 2.3 THOU/MM3 (ref 1–4.8)
MCH RBC QN AUTO: 29.8 PG (ref 26–33)
MCHC RBC AUTO-ENTMCNC: 33.2 GM/DL (ref 32.2–35.5)
MCV RBC AUTO: 89.9 FL (ref 80–94)
MONOCYTES # BLD: 7.9 %
MONOCYTES ABSOLUTE: 0.5 THOU/MM3 (ref 0.4–1.3)
NUCLEATED RED BLOOD CELLS: 0 /100 WBC
PLATELET # BLD: 257 THOU/MM3 (ref 130–400)
PMV BLD AUTO: 8.5 FL (ref 9.4–12.4)
POC CREATININE WHOLE BLOOD: 1 MG/DL (ref 0.5–1.2)
POTASSIUM SERPL-SCNC: 4.7 MEQ/L (ref 3.5–5.2)
PROSTATE SPECIFIC ANTIGEN: 0.66 NG/ML (ref 0–1)
RBC # BLD: 4.73 MILL/MM3 (ref 4.7–6.1)
SEG NEUTROPHILS: 48.7 %
SEGMENTED NEUTROPHILS ABSOLUTE COUNT: 3.2 THOU/MM3 (ref 1.8–7.7)
SODIUM BLD-SCNC: 140 MEQ/L (ref 135–145)
TOTAL PROTEIN: 7.3 G/DL (ref 6.1–8)
WBC # BLD: 6.6 THOU/MM3 (ref 4.8–10.8)

## 2019-03-11 PROCEDURE — 82378 CARCINOEMBRYONIC ANTIGEN: CPT

## 2019-03-11 PROCEDURE — 36415 COLL VENOUS BLD VENIPUNCTURE: CPT

## 2019-03-11 PROCEDURE — 84153 ASSAY OF PSA TOTAL: CPT

## 2019-03-11 PROCEDURE — 80053 COMPREHEN METABOLIC PANEL: CPT

## 2019-03-11 PROCEDURE — 82565 ASSAY OF CREATININE: CPT

## 2019-03-11 PROCEDURE — 85025 COMPLETE CBC W/AUTO DIFF WBC: CPT

## 2019-03-11 PROCEDURE — 71260 CT THORAX DX C+: CPT

## 2019-03-11 PROCEDURE — 6360000004 HC RX CONTRAST MEDICATION: Performed by: INTERNAL MEDICINE

## 2019-03-11 RX ADMIN — IOPAMIDOL 85 ML: 755 INJECTION, SOLUTION INTRAVENOUS at 07:09

## 2019-03-21 ENCOUNTER — OFFICE VISIT (OUTPATIENT)
Dept: FAMILY MEDICINE CLINIC | Age: 64
End: 2019-03-21
Payer: MEDICARE

## 2019-03-21 VITALS
SYSTOLIC BLOOD PRESSURE: 126 MMHG | HEART RATE: 64 BPM | WEIGHT: 232.8 LBS | BODY MASS INDEX: 35.28 KG/M2 | HEIGHT: 68 IN | RESPIRATION RATE: 16 BRPM | DIASTOLIC BLOOD PRESSURE: 82 MMHG | OXYGEN SATURATION: 97 %

## 2019-03-21 DIAGNOSIS — G57.93 NEUROPATHY INVOLVING BOTH LOWER EXTREMITIES: ICD-10-CM

## 2019-03-21 DIAGNOSIS — J44.9 CHRONIC OBSTRUCTIVE PULMONARY DISEASE, UNSPECIFIED COPD TYPE (HCC): ICD-10-CM

## 2019-03-21 DIAGNOSIS — E78.49 OTHER HYPERLIPIDEMIA: ICD-10-CM

## 2019-03-21 DIAGNOSIS — I10 ESSENTIAL HYPERTENSION: ICD-10-CM

## 2019-03-21 DIAGNOSIS — Z85.118 HISTORY OF LUNG CANCER: ICD-10-CM

## 2019-03-21 DIAGNOSIS — R73.01 IFG (IMPAIRED FASTING GLUCOSE): ICD-10-CM

## 2019-03-21 DIAGNOSIS — M10.00 IDIOPATHIC GOUT, UNSPECIFIED CHRONICITY, UNSPECIFIED SITE: Primary | ICD-10-CM

## 2019-03-21 PROCEDURE — 99214 OFFICE O/P EST MOD 30 MIN: CPT | Performed by: FAMILY MEDICINE

## 2019-03-21 RX ORDER — ALBUTEROL SULFATE 90 UG/1
2 AEROSOL, METERED RESPIRATORY (INHALATION) EVERY 6 HOURS PRN
Qty: 1 INHALER | Refills: 5 | Status: SHIPPED | OUTPATIENT
Start: 2019-03-21 | End: 2020-06-09 | Stop reason: SDUPTHER

## 2019-03-21 ASSESSMENT — PATIENT HEALTH QUESTIONNAIRE - PHQ9
SUM OF ALL RESPONSES TO PHQ QUESTIONS 1-9: 0
1. LITTLE INTEREST OR PLEASURE IN DOING THINGS: 0
2. FEELING DOWN, DEPRESSED OR HOPELESS: 0
SUM OF ALL RESPONSES TO PHQ QUESTIONS 1-9: 0
SUM OF ALL RESPONSES TO PHQ9 QUESTIONS 1 & 2: 0

## 2019-03-21 ASSESSMENT — ENCOUNTER SYMPTOMS
GASTROINTESTINAL NEGATIVE: 1
RESPIRATORY NEGATIVE: 1

## 2019-04-03 ENCOUNTER — OFFICE VISIT (OUTPATIENT)
Dept: PULMONOLOGY | Age: 64
End: 2019-04-03
Payer: MEDICARE

## 2019-04-03 VITALS
HEART RATE: 56 BPM | RESPIRATION RATE: 16 BRPM | HEIGHT: 68 IN | DIASTOLIC BLOOD PRESSURE: 68 MMHG | OXYGEN SATURATION: 95 % | TEMPERATURE: 99 F | BODY MASS INDEX: 35.01 KG/M2 | WEIGHT: 231 LBS | SYSTOLIC BLOOD PRESSURE: 110 MMHG

## 2019-04-03 DIAGNOSIS — J44.9 STAGE 3 SEVERE COPD BY GOLD CLASSIFICATION (HCC): Primary | ICD-10-CM

## 2019-04-03 DIAGNOSIS — R06.02 SHORTNESS OF BREATH: ICD-10-CM

## 2019-04-03 DIAGNOSIS — C34.91 SQUAMOUS CELL CARCINOMA OF RIGHT LUNG (HCC): ICD-10-CM

## 2019-04-03 PROCEDURE — 99214 OFFICE O/P EST MOD 30 MIN: CPT | Performed by: NURSE PRACTITIONER

## 2019-04-03 NOTE — PROGRESS NOTES
[facility]    Patient: Aga Cobb, 66 y.o.   : 1955  4/3/2019    Pt of Dr. Reddy   Patient presents with    1 Year Follow Up     Lung Cancer        HPI  Robert Palumbo is here for 1 year follow up for [facility] having RUL lobectomy 2016 with severe COPD FEV! 45% (55% before surgery). He has a 41 pack year smoking history quitting in . Continues to follow routinely with Dr. Chelly Burger with recent CT chest showing stable findings. He is following stable 0.7 cm RML and 1.0 cm CELINA nodule as well. He has mild centrilobular emphysematous findings with parenchymal scarring from surgery. Presents today with slight increase in CANTOR in which he contributes to weight gain and pre-diabetic diagnosis. He continues on Spiriva but is now using GURVINDER at least once per day. No home 02. No treatment for acute exacerbations. He has chronic thoracotomy pain that is controlled. Progress History:   Since last visit any new medical issues? Yes Pre diabetes  New ER or hospitlal visits? No  Any new or changes in medicines? No  Using inhalers? Yes Spiriva  Are they helpful?  Yes   Past Medical hx   PMH:  Past Medical History:   Diagnosis Date    Asthma     Cancer ([facility] [address]     LUNG    COPD (chronic obstructive pulmonary disease) (HCC)     GERD (gastroesophageal reflux disease)     Hypertension     Lung mass     RIGHT UPPER LOBE     SURGICAL HISTORY:  Past Surgical History:   Procedure Laterality Date    COLONOSCOPY      FRACTURE SURGERY      collar bone    LOBECTOMY Right 3/24/16    upper    LUNG BIOPSY Right 3/3/16    Dr Boothe Diana HISTORY:  Social History     Tobacco Use    Smoking status: Former Smoker     Packs/day: 0.00     Years: 41.00     Pack years: 0.00     Types: Cigarettes     Start date: 1973     Last attempt to quit: 3/24/2016     Years since quitting: 3.0    Smokeless tobacco: Never Used   Substance Use BMI 35.12 kg/m²      Physical Exam   Constitutional: Patient appears moderately built and moderately nourished. In no acute distress. Head: Normocephalic and atraumatic. Mouth/Throat: Oropharynx is clear and moist.  No oral thrush. Neck: Neck supple. No tracheal deviation present. Cardiovascular: Regular rate, regular rhythm, S1 and S2 with no murmur. No peripheral edema  Pulmonary/Chest: Normal effort with clear breath sounds. No stridor. No respiratory distress. Abdominal: Soft. No distension or tenderness to palpation. Musculoskeletal: Moves all extremities. Lymphadenopathy:  No cervical adenopathy. Neurological: Patient is alert and oriented to person, place, and time. No focal deficits. Skin: Warm and dry. No clubbing or cyanosis. Test results   Lung Nodule Screening     [x] Qualifies    [] Does not qualify   [] Declined    [x] Completed    3/11/19   Impression   1. There is a stable 0.7 cm noncalcified nodule anteriorly within the right mid chest (series 2 image 26). 2. There is a stable 1.0 cm irregular pleural-based nodular radiodensity posteriorly within the left upper chest (series 2 image 12). 3. Mild centrilobular emphysema. 4. There is no pathologically enlarged lymphadenopathy.           Assessment      Diagnosis Orders   1. Stage 3 severe COPD by GOLD classification ([address]     2. Squamous cell carcinoma of right lung ([address]     3. Shortness of breath           Plan   Stop Spiriva. Start Anoro 1 puff daily (1 sample given with demonstration). Continue Ventolin PRN. Following routinely with Dr. Ousmane Sanchez for imaging. He is up to date with flu and PNA 23. Check on Prevnar next visit. Full PFT next visit. AAT next visit.     Will see Marquitajg Matias back in: 6 months    Electronically signed by DEMI Koch CNP on 4/3/2019 at 8:47 AM'

## 2019-04-09 ENCOUNTER — TELEPHONE (OUTPATIENT)
Dept: PULMONOLOGY | Age: 64
End: 2019-04-09

## 2019-04-09 NOTE — TELEPHONE ENCOUNTER
Patient called stating he does not like the Anoro. He stated he did better on the Spiriva. He said the Anoro gives him a headache daily and just does not feel it works as well. Please advise.

## 2019-06-04 ENCOUNTER — CARE COORDINATION (OUTPATIENT)
Dept: CASE MANAGEMENT | Age: 64
End: 2019-06-04

## 2019-06-14 ENCOUNTER — CARE COORDINATION (OUTPATIENT)
Dept: CASE MANAGEMENT | Age: 64
End: 2019-06-14

## 2019-06-14 NOTE — CARE COORDINATION
Name: Marilyn Forrest    ### Patient Details  YOB: 1955  MRN: [id number]    ### Encounter Details  Encounter ID: [id number]  Arrival Date: N/A  Discharge Date: N/A    ### Related interaction  COPD High Touch UA (Welcome Call) ([email or web address])    ### Required Interventions and Feedback     CarePATH Update         *Patient Status changed in CarePATH to[de-identified]     Patient Declined (selected by Enrique Cardoso on 06/14/2019 10:49 AM EDT)    Explain why patient opted out[de-identified]     UTC and HANG UPS (edited by Enrique Cardoso on 06/14/2019 10:49 AM EDT)     Call Status         *Call Status:     Patient Not Reached After 1st Attempt - Left Voicemail (edited by Enrique Cardoso on 06/14/2019 10:47 AM EDT)

## 2019-06-16 ENCOUNTER — CARE COORDINATION (OUTPATIENT)
Dept: CASE MANAGEMENT | Age: 64
End: 2019-06-16

## 2019-06-17 ENCOUNTER — CARE COORDINATION (OUTPATIENT)
Dept: CASE MANAGEMENT | Age: 64
End: 2019-06-17

## 2019-06-17 NOTE — CARE COORDINATION
Name: Elijah Lord    ### Patient Details  YOB: 1955  MRN: [id number]    ### Encounter Details  Encounter ID: [id number]  Arrival Date: N/A  Discharge Date: N/A    ### Related interaction  COPD High Touch UA (COPD High Touch UA) ([email or web address])    ### Questions     Question 1   Other Symptoms   Please press 1 if yes; Press 2 if no   Has Symptoms (Issue Panel: Medium Priority COPD)    ### Required Interventions and Feedback     Call Status         *Call Status (Required):     Patient Reached (edited by Tello Granda on 06/17/2019 11:31 AM EDT)    Call status details:     Patient states that he is not having symptoms. He states that when he does have S/S he calls his MD Patient requested to be removed from call list. Will D/C calls per patient request.  (edited by Tello Granda on 06/17/2019 11:34 AM EDT)  Debra JOHNSONN RN.          Pre-Call Opportunity Review         High Priority :     Yes (edited by Tello Granda on 06/17/2019 11:34 AM EDT)

## 2019-07-06 DIAGNOSIS — M10.00 IDIOPATHIC GOUT, UNSPECIFIED CHRONICITY, UNSPECIFIED SITE: ICD-10-CM

## 2019-07-07 DIAGNOSIS — E78.49 OTHER HYPERLIPIDEMIA: ICD-10-CM

## 2019-07-08 RX ORDER — ALLOPURINOL 100 MG/1
TABLET ORAL
Qty: 30 TABLET | Refills: 9 | Status: SHIPPED | OUTPATIENT
Start: 2019-07-08 | End: 2020-03-19 | Stop reason: SDUPTHER

## 2019-07-08 RX ORDER — ATORVASTATIN CALCIUM 20 MG/1
TABLET, FILM COATED ORAL
Qty: 90 TABLET | Refills: 2 | Status: SHIPPED | OUTPATIENT
Start: 2019-07-08 | End: 2020-03-19 | Stop reason: SDUPTHER

## 2019-08-29 ENCOUNTER — OFFICE VISIT (OUTPATIENT)
Dept: FAMILY MEDICINE CLINIC | Age: 64
End: 2019-08-29
Payer: MEDICARE

## 2019-08-29 VITALS
SYSTOLIC BLOOD PRESSURE: 126 MMHG | WEIGHT: 218.3 LBS | HEART RATE: 70 BPM | BODY MASS INDEX: 33.19 KG/M2 | DIASTOLIC BLOOD PRESSURE: 72 MMHG | OXYGEN SATURATION: 97 % | RESPIRATION RATE: 16 BRPM

## 2019-08-29 DIAGNOSIS — L30.1 DYSHIDROTIC ECZEMA: Primary | ICD-10-CM

## 2019-08-29 PROCEDURE — 99212 OFFICE O/P EST SF 10 MIN: CPT | Performed by: NURSE PRACTITIONER

## 2019-08-29 RX ORDER — BETAMETHASONE DIPROPIONATE 0.5 MG/G
CREAM TOPICAL
Qty: 1 TUBE | Refills: 1 | Status: SHIPPED | OUTPATIENT
Start: 2019-08-29 | End: 2019-09-23

## 2019-09-05 LAB
AVERAGE GLUCOSE: 123 MG/DL
CHOLESTEROL/HDL RATIO: 3.6 (ref 1–5)
CHOLESTEROL: 131 MG/DL (ref 150–200)
HBA1C MFR BLD: 5.9 % (ref 4.4–6.4)
HDLC SERPL-MCNC: 36 MG/DL
LDL CHOLESTEROL CALCULATED: 80 MG/DL
LDL/HDL RATIO: 2.2
TRIGL SERPL-MCNC: 76 MG/DL (ref 27–150)
TSH SERPL DL<=0.05 MIU/L-ACNC: 1.31 UIU/ML (ref 0.49–4.67)
URIC ACID: 7.7 MG/DL (ref 2.6–7.2)
VLDLC SERPL CALC-MCNC: 15 MG/DL (ref 0–30)

## 2019-09-09 ENCOUNTER — HOSPITAL ENCOUNTER (OUTPATIENT)
Age: 64
Discharge: HOME | End: 2019-09-09
Payer: MEDICARE

## 2019-09-09 ENCOUNTER — HOSPITAL ENCOUNTER (OUTPATIENT)
Dept: GENERAL RADIOLOGY | Age: 64
Discharge: HOME | End: 2019-09-09
Payer: MEDICARE

## 2019-09-09 DIAGNOSIS — C34.11 CANCER OF BRONCHUS OF RIGHT UPPER LOBE (HCC): ICD-10-CM

## 2019-09-09 DIAGNOSIS — R11.2 NAUSEA AND VOMITING, INTRACTABILITY OF VOMITING NOT SPECIFIED, UNSPECIFIED VOMITING TYPE: ICD-10-CM

## 2019-09-09 PROCEDURE — 71046 X-RAY EXAM CHEST 2 VIEWS: CPT

## 2019-09-23 ENCOUNTER — OFFICE VISIT (OUTPATIENT)
Dept: FAMILY MEDICINE CLINIC | Age: 64
End: 2019-09-23
Payer: MEDICARE

## 2019-09-23 VITALS
SYSTOLIC BLOOD PRESSURE: 120 MMHG | OXYGEN SATURATION: 97 % | DIASTOLIC BLOOD PRESSURE: 68 MMHG | RESPIRATION RATE: 16 BRPM | WEIGHT: 216.3 LBS | HEIGHT: 69 IN | HEART RATE: 68 BPM | BODY MASS INDEX: 32.04 KG/M2

## 2019-09-23 DIAGNOSIS — L30.1 DYSHIDROTIC ECZEMA: Primary | ICD-10-CM

## 2019-09-23 DIAGNOSIS — J44.9 CHRONIC OBSTRUCTIVE PULMONARY DISEASE, UNSPECIFIED COPD TYPE (HCC): ICD-10-CM

## 2019-09-23 DIAGNOSIS — I10 ESSENTIAL HYPERTENSION: ICD-10-CM

## 2019-09-23 DIAGNOSIS — M10.00 IDIOPATHIC GOUT, UNSPECIFIED CHRONICITY, UNSPECIFIED SITE: ICD-10-CM

## 2019-09-23 DIAGNOSIS — E78.49 OTHER HYPERLIPIDEMIA: ICD-10-CM

## 2019-09-23 DIAGNOSIS — Z00.00 ROUTINE GENERAL MEDICAL EXAMINATION AT A HEALTH CARE FACILITY: ICD-10-CM

## 2019-09-23 DIAGNOSIS — R73.01 IFG (IMPAIRED FASTING GLUCOSE): ICD-10-CM

## 2019-09-23 DIAGNOSIS — G57.93 NEUROPATHY INVOLVING BOTH LOWER EXTREMITIES: ICD-10-CM

## 2019-09-23 DIAGNOSIS — E78.6 LOW HDL (UNDER 40): ICD-10-CM

## 2019-09-23 DIAGNOSIS — Z85.118 HISTORY OF LUNG CANCER: ICD-10-CM

## 2019-09-23 PROCEDURE — 99214 OFFICE O/P EST MOD 30 MIN: CPT | Performed by: FAMILY MEDICINE

## 2019-09-23 PROCEDURE — G0438 PPPS, INITIAL VISIT: HCPCS | Performed by: FAMILY MEDICINE

## 2019-09-23 RX ORDER — PREDNISONE 20 MG/1
20 TABLET ORAL 2 TIMES DAILY
Qty: 20 TABLET | Refills: 0 | Status: SHIPPED | OUTPATIENT
Start: 2019-09-23 | End: 2019-10-03

## 2019-09-23 RX ORDER — CLOBETASOL PROPIONATE 0.5 MG/G
OINTMENT TOPICAL
Qty: 45 G | Refills: 2 | Status: SHIPPED | OUTPATIENT
Start: 2019-09-23 | End: 2021-06-22 | Stop reason: SDUPTHER

## 2019-09-23 ASSESSMENT — LIFESTYLE VARIABLES
AUDIT TOTAL SCORE: 6
HOW OFTEN DURING THE LAST YEAR HAVE YOU NEEDED AN ALCOHOLIC DRINK FIRST THING IN THE MORNING TO GET YOURSELF GOING AFTER A NIGHT OF HEAVY DRINKING: 0
HOW OFTEN DURING THE LAST YEAR HAVE YOU FOUND THAT YOU WERE NOT ABLE TO STOP DRINKING ONCE YOU HAD STARTED: 0
HOW OFTEN DO YOU HAVE A DRINK CONTAINING ALCOHOL: 3
HAS A RELATIVE, FRIEND, DOCTOR, OR ANOTHER HEALTH PROFESSIONAL EXPRESSED CONCERN ABOUT YOUR DRINKING OR SUGGESTED YOU CUT DOWN: 0
AUDIT-C TOTAL SCORE: 6
HAVE YOU OR SOMEONE ELSE BEEN INJURED AS A RESULT OF YOUR DRINKING: 0
HOW OFTEN DURING THE LAST YEAR HAVE YOU HAD A FEELING OF GUILT OR REMORSE AFTER DRINKING: 0
HOW MANY STANDARD DRINKS CONTAINING ALCOHOL DO YOU HAVE ON A TYPICAL DAY: 1
HOW OFTEN DURING THE LAST YEAR HAVE YOU BEEN UNABLE TO REMEMBER WHAT HAPPENED THE NIGHT BEFORE BECAUSE YOU HAD BEEN DRINKING: 0
HOW OFTEN DURING THE LAST YEAR HAVE YOU FAILED TO DO WHAT WAS NORMALLY EXPECTED FROM YOU BECAUSE OF DRINKING: 0
HOW OFTEN DO YOU HAVE SIX OR MORE DRINKS ON ONE OCCASION: 2

## 2019-09-23 ASSESSMENT — ENCOUNTER SYMPTOMS
GASTROINTESTINAL NEGATIVE: 1
COUGH: 1
SHORTNESS OF BREATH: 1

## 2019-09-23 ASSESSMENT — PATIENT HEALTH QUESTIONNAIRE - PHQ9
SUM OF ALL RESPONSES TO PHQ QUESTIONS 1-9: 0
SUM OF ALL RESPONSES TO PHQ QUESTIONS 1-9: 0

## 2019-09-23 NOTE — PROGRESS NOTES
MCG/ACT inhaler Inhale 2 puffs into the lungs every 6 hours as needed for Wheezing or Shortness of Breath 3/21/19  Yes Adair Friendly, DO   lisinopril (PRINIVIL) 10 MG tablet Take 1 tablet by mouth daily 2/12/19  Yes Adair Friendly, DO   aspirin 81 MG tablet Take 81 mg by mouth daily. Yes Historical Provider, MD   omeprazole (PRILOSEC OTC) 20 MG tablet Take 20 mg by mouth daily.    Yes Historical Provider, MD       Lab Results   Component Value Date    LABA1C 5.9 09/04/2019     No results found for: EAG    No components found for: CHLPL  Lab Results   Component Value Date    TRIG 76 09/04/2019    TRIG 154 (H) 06/11/2018    TRIG 218 04/06/2018     Lab Results   Component Value Date    HDL 36 (L) 09/04/2019    HDL 32 (L) 06/11/2018    HDL 30 (A) 04/06/2018     Lab Results   Component Value Date    LDLCALC 80 09/04/2019    LDLCALC 66 06/11/2018    LDLCALC 134 04/06/2018     Lab Results   Component Value Date    LABVLDL 15 09/04/2019    LABVLDL 31 (H) 06/11/2018         Chemistry        Component Value Date/Time     03/11/2019 0657    K 4.7 03/11/2019 0657     03/11/2019 0657    CO2 27 03/11/2019 0657    BUN 7 03/11/2019 0657    CREATININE 1.0 03/11/2019 0657        Component Value Date/Time    CALCIUM 9.7 03/11/2019 0657    ALKPHOS 114 03/11/2019 0657    AST 26 03/11/2019 0657    ALT 34 03/11/2019 0657    BILITOT 0.3 03/11/2019 0657            Lab Results   Component Value Date    TSH 1.31 09/04/2019       Lab Results   Component Value Date    WBC 6.6 03/11/2019    HGB 14.1 03/11/2019    HCT 42.5 03/11/2019    MCV 89.9 03/11/2019     03/11/2019         Health Maintenance   Topic Date Due    Hepatitis C screen  1955    HIV screen  09/26/1972    DTaP/Tdap/Td vaccine (1 - Tdap) 09/26/1976    Shingles Vaccine (1 of 2) 09/26/2007    Annual Wellness Visit (AWV)  05/29/2019    Flu vaccine (1) 09/01/2019    Potassium monitoring  03/11/2020    Creatinine monitoring  03/11/2020    A1C test (Diabetic or Prediabetic)  09/04/2020    Lipid screen  09/04/2024    Colon cancer screen colonoscopy  04/02/2025    Pneumococcal 0-64 years Vaccine  Completed       Immunization History   Administered Date(s) Administered    Influenza Virus Vaccine 10/23/2018    Pneumococcal Polysaccharide ([id number]) 11/15/2017         Review of Systems   Constitutional: Negative. HENT: Negative. Respiratory: Positive for cough and shortness of breath. Cardiovascular: Negative. Gastrointestinal: Negative. Musculoskeletal: Negative. Skin: Positive for rash (hands). All other systems reviewed and are negative. Objective:   Physical Exam   Constitutional: He is oriented to person, place, and time. He appears well-developed and well-nourished. HENT:   Head: Normocephalic and atraumatic. Right Ear: Tympanic membrane normal.   Left Ear: Tympanic membrane normal.   Mouth/Throat: Oropharynx is clear and moist and mucous membranes are normal.   Neck: Carotid bruit is not present. Cardiovascular: Normal rate, regular rhythm and normal heart sounds. No murmur heard. Pulmonary/Chest: Effort normal and breath sounds normal.   Abdominal: Soft. Bowel sounds are normal.   Musculoskeletal: He exhibits no edema. Neurological: He is alert and oriented to person, place, and time. Skin: Skin is warm and dry. Rash (dishydrotic eczema to both hands, R>L) noted. Psychiatric: He has a normal mood and affect. His behavior is normal.   Nursing note and vitals reviewed. Assessment:       Diagnosis Orders   1. Dyshidrotic eczema  predniSONE (DELTASONE) 20 MG tablet    clobetasol (TEMOVATE) 0.05 % ointment   2. Other hyperlipidemia     3. Idiopathic gout, unspecified chronicity, unspecified site     4. Chronic obstructive pulmonary disease, unspecified COPD type ([facility]     5. IFG (impaired fasting glucose)     6. Essential hypertension     7. Low HDL (under 40)     8.  Neuropathy involving both lower Resp: 16   SpO2: 97%   Weight: 216 lb 4.8 oz (98.1 kg)   Height: 5' 8.5\" (1.74 m)     Body mass index is 32.41 kg/m². Based upon direct observation of the patient, evaluation of cognition reveals recent and remote memory intact. Patient's complete Health Risk Assessment and screening values have been reviewed and are found in Flowsheets. The following problems were reviewed today and where indicated follow up appointments were made and/or referrals ordered. Positive Risk Factor Screenings with Interventions:     General Health:  General  In general, how would you say your health is?: Good  In the past 7 days, have you experienced any of the following? New or Increased Pain, New or Increased Fatigue, Loneliness, Social Isolation, Stress or Anger?: None of These  Do you get the social and emotional support that you need?: Yes  Do you have a Living Will?: (!) No  General Health Risk Interventions:  · No Living Will: patient declined    Health Habits/Nutrition:  Health Habits/Nutrition  Do you exercise for at least 20 minutes 2-3 times per week?: Yes  Have you lost any weight without trying in the past 3 months?: No  Do you eat fewer than 2 meals per day?: No  Have you seen a dentist within the past year?: (!) No  Body mass index is 32.41 kg/m².   Health Habits/Nutrition Interventions:  · Dental exam overdue:  patient encouraged to make appointment with his/her dentist    Hearing/Vision:  No exam data present  Hearing/Vision  Do you or your family notice any trouble with your hearing?: No  Do you have difficulty driving, watching TV, or doing any of your daily activities because of your eyesight?: No  Have you had an eye exam within the past year?: (!) No  Hearing/Vision Interventions:  · Vision concerns:  patient encouraged to make appointment with his/her eye specialist    Safety:  Safety  Do you have working smoke detectors?: Yes  Have all throw rugs been removed or fastened?: (!) No  Do you have non-slip

## 2019-09-27 ENCOUNTER — HOSPITAL ENCOUNTER (OUTPATIENT)
Dept: PULMONOLOGY | Age: 64
Discharge: HOME | End: 2019-09-27
Payer: MEDICARE

## 2019-09-27 DIAGNOSIS — J44.9 STAGE 3 SEVERE COPD BY GOLD CLASSIFICATION (HCC): ICD-10-CM

## 2019-09-27 PROCEDURE — 94726 PLETHYSMOGRAPHY LUNG VOLUMES: CPT

## 2019-09-27 PROCEDURE — 94729 DIFFUSING CAPACITY: CPT

## 2019-09-27 PROCEDURE — 94060 EVALUATION OF WHEEZING: CPT

## 2019-10-07 ENCOUNTER — OFFICE VISIT (OUTPATIENT)
Dept: PULMONOLOGY | Age: 64
End: 2019-10-07
Payer: MEDICARE

## 2019-10-07 VITALS
WEIGHT: 218 LBS | DIASTOLIC BLOOD PRESSURE: 82 MMHG | HEIGHT: 68 IN | OXYGEN SATURATION: 98 % | SYSTOLIC BLOOD PRESSURE: 136 MMHG | HEART RATE: 56 BPM | TEMPERATURE: 97.6 F | BODY MASS INDEX: 33.04 KG/M2

## 2019-10-07 DIAGNOSIS — C34.91 SQUAMOUS CELL CARCINOMA OF RIGHT LUNG (HCC): ICD-10-CM

## 2019-10-07 DIAGNOSIS — J44.9 STAGE 3 SEVERE COPD BY GOLD CLASSIFICATION (HCC): Primary | ICD-10-CM

## 2019-10-07 DIAGNOSIS — R91.8 LUNG NODULES: ICD-10-CM

## 2019-10-07 PROCEDURE — 99214 OFFICE O/P EST MOD 30 MIN: CPT | Performed by: NURSE PRACTITIONER

## 2020-02-07 RX ORDER — LISINOPRIL 10 MG/1
TABLET ORAL
Qty: 90 TABLET | Refills: 0 | Status: SHIPPED | OUTPATIENT
Start: 2020-02-07 | End: 2020-03-19 | Stop reason: SDUPTHER

## 2020-02-24 RX ORDER — TIOTROPIUM BROMIDE INHALATION SPRAY 3.12 UG/1
SPRAY, METERED RESPIRATORY (INHALATION)
Qty: 4 G | Refills: 0 | Status: SHIPPED | OUTPATIENT
Start: 2020-02-24 | End: 2020-03-19 | Stop reason: SDUPTHER

## 2020-03-10 ENCOUNTER — HOSPITAL ENCOUNTER (OUTPATIENT)
Dept: CT IMAGING | Age: 65
Discharge: HOME | End: 2020-03-10
Payer: MEDICARE

## 2020-03-10 LAB — POC CREATININE WHOLE BLOOD: 1.2 MG/DL (ref 0.5–1.2)

## 2020-03-10 PROCEDURE — 82565 ASSAY OF CREATININE: CPT

## 2020-03-10 PROCEDURE — 6360000004 HC RX CONTRAST MEDICATION: Performed by: INTERNAL MEDICINE

## 2020-03-10 PROCEDURE — 71260 CT THORAX DX C+: CPT

## 2020-03-10 RX ADMIN — IOPAMIDOL 80 ML: 755 INJECTION, SOLUTION INTRAVENOUS at 09:52

## 2020-03-19 RX ORDER — ALLOPURINOL 100 MG/1
TABLET ORAL
Qty: 30 TABLET | Refills: 9 | Status: SHIPPED | OUTPATIENT
Start: 2020-03-19 | End: 2020-12-08 | Stop reason: SDUPTHER

## 2020-03-19 RX ORDER — TIOTROPIUM BROMIDE INHALATION SPRAY 3.12 UG/1
SPRAY, METERED RESPIRATORY (INHALATION)
Qty: 4 G | Refills: 0 | Status: SHIPPED | OUTPATIENT
Start: 2020-03-19 | End: 2020-05-01

## 2020-03-19 RX ORDER — ATORVASTATIN CALCIUM 20 MG/1
TABLET, FILM COATED ORAL
Qty: 90 TABLET | Refills: 2 | Status: SHIPPED | OUTPATIENT
Start: 2020-03-19 | End: 2020-12-08 | Stop reason: SDUPTHER

## 2020-03-19 RX ORDER — LISINOPRIL 10 MG/1
TABLET ORAL
Qty: 90 TABLET | Refills: 0 | Status: SHIPPED | OUTPATIENT
Start: 2020-03-19 | End: 2020-08-11

## 2020-03-19 NOTE — TELEPHONE ENCOUNTER
Patient returned call to the office rescheduled his OV to 4/13/2020, he will need refills of Allopurinol 100 mg, Atorvastatin 20 mg, Lisinopril 10 mg tablets and Spiriva Respimat 2.5 MCG/ACT inhaler to [address]. If no call patient will check with pharmacy later today. Please advise.

## 2020-06-09 ENCOUNTER — OFFICE VISIT (OUTPATIENT)
Dept: FAMILY MEDICINE CLINIC | Age: 65
End: 2020-06-09
Payer: MEDICARE

## 2020-06-09 VITALS
SYSTOLIC BLOOD PRESSURE: 116 MMHG | RESPIRATION RATE: 15 BRPM | OXYGEN SATURATION: 98 % | WEIGHT: 204.4 LBS | TEMPERATURE: 97.3 F | DIASTOLIC BLOOD PRESSURE: 68 MMHG | HEART RATE: 77 BPM | BODY MASS INDEX: 31.08 KG/M2

## 2020-06-09 PROCEDURE — 99214 OFFICE O/P EST MOD 30 MIN: CPT | Performed by: FAMILY MEDICINE

## 2020-06-09 RX ORDER — ALBUTEROL SULFATE 90 UG/1
2 AEROSOL, METERED RESPIRATORY (INHALATION) EVERY 6 HOURS PRN
Qty: 1 INHALER | Refills: 5 | Status: SHIPPED | OUTPATIENT
Start: 2020-06-09

## 2020-06-09 SDOH — ECONOMIC STABILITY: FOOD INSECURITY: WITHIN THE PAST 12 MONTHS, YOU WORRIED THAT YOUR FOOD WOULD RUN OUT BEFORE YOU GOT MONEY TO BUY MORE.: NEVER TRUE

## 2020-06-09 SDOH — ECONOMIC STABILITY: TRANSPORTATION INSECURITY
IN THE PAST 12 MONTHS, HAS LACK OF TRANSPORTATION KEPT YOU FROM MEETINGS, WORK, OR FROM GETTING THINGS NEEDED FOR DAILY LIVING?: NO

## 2020-06-09 SDOH — ECONOMIC STABILITY: INCOME INSECURITY: HOW HARD IS IT FOR YOU TO PAY FOR THE VERY BASICS LIKE FOOD, HOUSING, MEDICAL CARE, AND HEATING?: NOT HARD AT ALL

## 2020-06-09 SDOH — ECONOMIC STABILITY: TRANSPORTATION INSECURITY
IN THE PAST 12 MONTHS, HAS THE LACK OF TRANSPORTATION KEPT YOU FROM MEDICAL APPOINTMENTS OR FROM GETTING MEDICATIONS?: NO

## 2020-06-09 SDOH — ECONOMIC STABILITY: FOOD INSECURITY: WITHIN THE PAST 12 MONTHS, THE FOOD YOU BOUGHT JUST DIDN'T LAST AND YOU DIDN'T HAVE MONEY TO GET MORE.: NEVER TRUE

## 2020-06-09 ASSESSMENT — PATIENT HEALTH QUESTIONNAIRE - PHQ9
SUM OF ALL RESPONSES TO PHQ9 QUESTIONS 1 & 2: 0
1. LITTLE INTEREST OR PLEASURE IN DOING THINGS: 0
SUM OF ALL RESPONSES TO PHQ QUESTIONS 1-9: 0
2. FEELING DOWN, DEPRESSED OR HOPELESS: 0
SUM OF ALL RESPONSES TO PHQ QUESTIONS 1-9: 0

## 2020-06-09 ASSESSMENT — ENCOUNTER SYMPTOMS
RESPIRATORY NEGATIVE: 1
GASTROINTESTINAL NEGATIVE: 1

## 2020-06-09 NOTE — PROGRESS NOTES
Subjective:      Patient ID: Dewayne Newman is a 60 y.o. male. HPI:    Chief Complaint   Patient presents with    6 Month Follow-Up    Hypertension    Medication Refill     6 month eval.  Doing well overall. BPs well controlled. BP Readings from Last 3 Encounters:   06/09/20 116/68   10/07/19 136/82   09/23/19 120/68     Weight 14 lbs since last visit. Trying to lose, eating better and avoiding sugar. No pop at all anymore. Wt Readings from Last 3 Encounters:   06/09/20 204 lb 6.4 oz (92.7 kg)   10/07/19 218 lb (98.9 kg)   09/23/19 216 lb 4.8 oz (98.1 kg)     Breathing stable. On Spiriva and albuterol prn. GERD controlled on the omeprazole. No recent gouty attacks. Patient Active Problem List   Diagnosis    HTN (hypertension)    IFG (impaired fasting glucose)    COPD (chronic obstructive pulmonary disease) (HCC)    Low HDL (under 40)    Situational anxiety    Squamous cell carcinoma of right lung ([facility] [address]    Open wound of back, complicated     Past Surgical History:   Procedure Laterality Date    COLONOSCOPY      FRACTURE SURGERY      collar bone    LOBECTOMY Right 3/24/16    upper    LUNG BIOPSY Right 3/3/16    Dr Deandre Overton       Prior to Admission medications    Medication Sig Start Date End Date Taking? Authorizing Provider   SPIRIVA RESPIMAT 2.5 MCG/ACT AERS inhaler INHALE 2 SPRAY(S) BY MOUTH ONCE DAILY 5/1/20  Yes Jenni Echols DO   atorvastatin (LIPITOR) 20 MG tablet TAKE 1 TABLET BY MOUTH AT BEDTIME 3/19/20  Yes Jenni Echols DO   allopurinol (ZYLOPRIM) 100 MG tablet TAKE 1 TABLET BY MOUTH ONCE DAILY 3/19/20  Yes Jenni Echols DO   lisinopril (PRINIVIL;ZESTRIL) 10 MG tablet TAKE 1 TABLET BY MOUTH ONCE DAILY 3/19/20  Yes Jenni Echols DO   clobetasol (TEMOVATE) 0.05 % ointment Apply topically 2 times daily.  9/23/19  Yes Jenni Echols DO   albuterol sulfate  (90 Base) MCG/ACT inhaler Inhale 2 puffs into the lungs every 6 hours as needed for Wheezing or Shortness of Breath 3/21/19  Yes Renee Jaeger, DO   aspirin 81 MG tablet Take 81 mg by mouth daily. Yes Historical Provider, MD   omeprazole (PRILOSEC OTC) 20 MG tablet Take 20 mg by mouth daily.    Yes Historical Provider, MD       Lab Results   Component Value Date    LABA1C 5.9 09/04/2019     No results found for: EAG    No components found for: CHLPL  Lab Results   Component Value Date    TRIG 76 09/04/2019    TRIG 154 (H) 06/11/2018    TRIG 218 04/06/2018     Lab Results   Component Value Date    HDL 36 (L) 09/04/2019    HDL 32 (L) 06/11/2018    HDL 30 (A) 04/06/2018     Lab Results   Component Value Date    LDLCALC 80 09/04/2019    LDLCALC 66 06/11/2018    LDLCALC 134 04/06/2018     Lab Results   Component Value Date    LABVLDL 15 09/04/2019    LABVLDL 31 (H) 06/11/2018         Chemistry        Component Value Date/Time     03/11/2019 0657    K 4.7 03/11/2019 0657     03/11/2019 0657    CO2 27 03/11/2019 0657    BUN 7 03/11/2019 0657    CREATININE 1.0 03/11/2019 0657        Component Value Date/Time    CALCIUM 9.7 03/11/2019 0657    ALKPHOS 114 03/11/2019 0657    AST 26 03/11/2019 0657    ALT 34 03/11/2019 0657    BILITOT 0.3 03/11/2019 0657            Lab Results   Component Value Date    TSH 1.31 09/04/2019       Lab Results   Component Value Date    WBC 6.6 03/11/2019    HGB 14.1 03/11/2019    HCT 42.5 03/11/2019    MCV 89.9 03/11/2019     03/11/2019         Health Maintenance   Topic Date Due    Hepatitis C screen  1955    HIV screen  09/26/1972    DTaP/Tdap/Td vaccine (1 - Tdap) 09/26/1976    Shingles Vaccine (1 of 2) 09/26/2007    Potassium monitoring  03/11/2020    Creatinine monitoring  03/11/2020    A1C test (Diabetic or Prediabetic)  09/04/2020    Lipid screen  09/04/2020    Annual Wellness Visit (AWV)  09/23/2020    Colon cancer screen colonoscopy  04/02/2025    Flu vaccine  Completed    Pneumococcal 0-64 years Vaccine  Completed    Hepatitis A vaccine  Aged Out    Hepatitis B vaccine  Aged Out    Hib vaccine  Aged Out    Meningococcal (ACWY) vaccine  Aged [employer] History   Administered Date(s) Administered    Influenza Virus Vaccine 10/23/2018, 10/02/2019    Pneumococcal Polysaccharide ([id number]) 11/15/2017       Review of Systems   Constitutional: Negative. HENT: Negative. Respiratory: Negative. Cardiovascular: Negative. Gastrointestinal: Negative. Musculoskeletal: Negative. All other systems reviewed and are negative. Objective:   Physical Exam  Vitals signs and nursing note reviewed. Constitutional:       Appearance: He is well-developed. HENT:      Head: Normocephalic and atraumatic. Right Ear: Tympanic membrane normal.      Left Ear: Tympanic membrane normal.   Neck:      Musculoskeletal: Neck supple. Vascular: No carotid bruit. Cardiovascular:      Rate and Rhythm: Normal rate and regular rhythm. Heart sounds: Normal heart sounds. No murmur. Pulmonary:      Effort: Pulmonary effort is normal.      Breath sounds: Normal breath sounds. Abdominal:      General: Bowel sounds are normal.      Palpations: Abdomen is soft. Skin:     General: Skin is warm and dry. Neurological:      Mental Status: He is alert and oriented to person, place, and time. Psychiatric:         Behavior: Behavior normal.         Assessment:       Diagnosis Orders   1. Essential hypertension     2. Chronic obstructive pulmonary disease, unspecified COPD type (HCC)  albuterol sulfate  (90 Base) MCG/ACT inhaler   3. Idiopathic gout, unspecified chronicity, unspecified site     4. Other hyperlipidemia  Lipid Panel    Comprehensive Metabolic Panel    TSH with Reflex   5. IFG (impaired fasting glucose)  Comprehensive Metabolic Panel    Hemoglobin A1C   6. Low HDL (under 40)     7. History of lung cancer     8. Dyshidrotic eczema     9.  Screening for prostate cancer  PSA, Prostatic Specific Antigen   10.  Gastroesophageal reflux disease, esophagitis presence not specified  CBC Auto Differential    Magnesium           Plan:      -  Chronic medical problems stable  -  Continue current medications  -  Follow up with specialists as scheduled  -  Check labs 6 mos  -  RTO 6 mos        Raúl Ruiz DO

## 2020-08-11 RX ORDER — LISINOPRIL 10 MG/1
TABLET ORAL
Qty: 90 TABLET | Refills: 0 | OUTPATIENT
Start: 2020-08-11

## 2020-09-14 ENCOUNTER — TELEPHONE (OUTPATIENT)
Dept: FAMILY MEDICINE CLINIC | Age: 65
End: 2020-09-14

## 2020-09-14 ENCOUNTER — HOSPITAL ENCOUNTER (OUTPATIENT)
Age: 65
Setting detail: SPECIMEN
Discharge: HOME | End: 2020-09-14
Payer: MEDICARE

## 2020-09-14 PROCEDURE — U0002 COVID-19 LAB TEST NON-CDC: HCPCS

## 2020-09-14 NOTE — TELEPHONE ENCOUNTER
Patient calling due to wife with positive COVID screening done at her employer today with a 15 minute test.  Patient is asymptomatic along with his wife. Requesting COVID screening.   Please advise

## 2020-09-14 NOTE — TELEPHONE ENCOUNTER
Pt notified and voiced understanding. Wife talked to her employer and [facility]. Pt will go to a new vision lab, advised him that office will call with results once received.

## 2020-09-15 LAB
PERFORMING LAB: NORMAL
REPORT: NORMAL
SARS-COV-2: NOT DETECTED

## 2020-09-24 ENCOUNTER — TELEPHONE (OUTPATIENT)
Dept: PULMONOLOGY | Age: 65
End: 2020-09-24

## 2020-09-25 ENCOUNTER — HOSPITAL ENCOUNTER (OUTPATIENT)
Age: 65
Setting detail: SPECIMEN
Discharge: HOME | End: 2020-09-25
Payer: MEDICARE

## 2020-09-25 PROCEDURE — U0003 INFECTIOUS AGENT DETECTION BY NUCLEIC ACID (DNA OR RNA); SEVERE ACUTE RESPIRATORY SYNDROME CORONAVIRUS 2 (SARS-COV-2) (CORONAVIRUS DISEASE [COVID-19]), AMPLIFIED PROBE TECHNIQUE, MAKING USE OF HIGH THROUGHPUT TECHNOLOGIES AS DESCRIBED BY CMS-2020-01-R: HCPCS

## 2020-09-27 LAB — SARS-COV-2: NOT DETECTED

## 2020-10-01 ENCOUNTER — HOSPITAL ENCOUNTER (OUTPATIENT)
Dept: PULMONOLOGY | Age: 65
Discharge: HOME | End: 2020-10-01
Payer: MEDICARE

## 2020-10-01 PROCEDURE — 94060 EVALUATION OF WHEEZING: CPT

## 2020-10-08 ENCOUNTER — OFFICE VISIT (OUTPATIENT)
Dept: PULMONOLOGY | Age: 65
End: 2020-10-08
Payer: MEDICARE

## 2020-10-08 VITALS
WEIGHT: 211.6 LBS | HEART RATE: 62 BPM | OXYGEN SATURATION: 98 % | TEMPERATURE: 97.9 F | DIASTOLIC BLOOD PRESSURE: 80 MMHG | HEIGHT: 66 IN | SYSTOLIC BLOOD PRESSURE: 126 MMHG | BODY MASS INDEX: 34.01 KG/M2

## 2020-10-08 PROCEDURE — 99214 OFFICE O/P EST MOD 30 MIN: CPT | Performed by: NURSE PRACTITIONER

## 2020-11-23 LAB
ABSOLUTE BASO #: 0 X10E9/L (ref 0–0.2)
ABSOLUTE EOS #: 0.5 X10E9/L (ref 0–0.4)
ABSOLUTE LYMPH #: 2.4 X10E9/L (ref 1–3.5)
ABSOLUTE MONO #: 0.4 X10E9/L (ref 0–0.9)
ABSOLUTE NEUT #: 3.7 X10E9/L (ref 1.5–6.6)
ALBUMIN SERPL-MCNC: 4.3 G/DL (ref 3.2–5.3)
ALK PHOSPHATASE: 68 U/L (ref 39–130)
ALT SERPL-CCNC: 26 U/L (ref 0–40)
ANION GAP SERPL CALCULATED.3IONS-SCNC: 13 MMOL/L (ref 5–15)
AST SERPL-CCNC: 32 U/L (ref 0–41)
AVERAGE GLUCOSE: 117 MG/DL
BASOPHILS RELATIVE PERCENT: 0.6 %
BILIRUB SERPL-MCNC: 0.4 MG/DL (ref 0.3–1.2)
BUN BLDV-MCNC: 14 MG/DL (ref 5–27)
CALCIUM SERPL-MCNC: 9.1 MG/DL (ref 8.5–10.5)
CHLORIDE BLD-SCNC: 104 MMOL/L (ref 98–109)
CHOLESTEROL/HDL RATIO: 3.5 (ref 1–5)
CHOLESTEROL: 139 MG/DL (ref 150–200)
CO2: 22 MMOL/L (ref 22–32)
CREAT SERPL-MCNC: 1.34 MG/DL (ref 0.6–1.3)
EGFR AFRICAN AMERICAN: >60 ML/MIN/1.73SQ.M
EGFR IF NONAFRICAN AMERICAN: 54 ML/MIN/1.73SQ.M
EOSINOPHILS RELATIVE PERCENT: 7.1 %
GLUCOSE: 110 MG/DL (ref 65–99)
HBA1C MFR BLD: 5.7 % (ref 4.4–6.4)
HCT VFR BLD CALC: 41.8 % (ref 39–49)
HDLC SERPL-MCNC: 40 MG/DL
HEMOGLOBIN: 14.2 G/DL (ref 13–17)
LDL CHOLESTEROL CALCULATED: 47 MG/DL
LDL/HDL RATIO: 1.2
LYMPHOCYTE %: 33.3 %
MAGNESIUM: 1.6 MG/DL (ref 1.8–2.6)
MCH RBC QN AUTO: 32 PG (ref 27–34)
MCHC RBC AUTO-ENTMCNC: 34 G/DL (ref 32–36)
MCV RBC AUTO: 94 FL (ref 80–100)
MONOCYTES # BLD: 6.1 %
NEUTROPHILS RELATIVE PERCENT: 52.9 %
PDW BLD-RTO: 13.9 % (ref 11.5–15)
PLATELETS: 228 X10E9/L (ref 150–450)
PMV BLD AUTO: 7.7 FL (ref 7–12)
POTASSIUM SERPL-SCNC: 4 MMOL/L (ref 3.5–5)
PSA, ULTRASENSITIVE: 0.61 NG/ML (ref 0–4)
RBC: 4.44 X10E12/L (ref 4.1–5.7)
SODIUM BLD-SCNC: 139 MMOL/L (ref 134–146)
TOTAL PROTEIN: 7.3 G/DL (ref 6–8)
TRIGL SERPL-MCNC: 260 MG/DL (ref 27–150)
TSH SERPL DL<=0.05 MIU/L-ACNC: 2.65 UIU/ML (ref 0.49–4.67)
VLDLC SERPL CALC-MCNC: 52 MG/DL (ref 0–30)
WBC: 7.1 X10E9/L (ref 4–11)

## 2020-12-08 ENCOUNTER — OFFICE VISIT (OUTPATIENT)
Dept: FAMILY MEDICINE CLINIC | Age: 65
End: 2020-12-08
Payer: MEDICARE

## 2020-12-08 VITALS
SYSTOLIC BLOOD PRESSURE: 122 MMHG | HEART RATE: 64 BPM | BODY MASS INDEX: 30.49 KG/M2 | HEIGHT: 69 IN | DIASTOLIC BLOOD PRESSURE: 66 MMHG | WEIGHT: 205.9 LBS | TEMPERATURE: 97.1 F | RESPIRATION RATE: 16 BRPM

## 2020-12-08 PROCEDURE — G0439 PPPS, SUBSEQ VISIT: HCPCS | Performed by: FAMILY MEDICINE

## 2020-12-08 PROCEDURE — 99213 OFFICE O/P EST LOW 20 MIN: CPT | Performed by: FAMILY MEDICINE

## 2020-12-08 RX ORDER — ALPRAZOLAM 0.25 MG/1
0.25 TABLET ORAL DAILY PRN
Qty: 30 TABLET | Refills: 0 | Status: SHIPPED | OUTPATIENT
Start: 2020-12-08 | End: 2021-01-07

## 2020-12-08 RX ORDER — ALLOPURINOL 100 MG/1
TABLET ORAL
Qty: 90 TABLET | Refills: 3 | Status: SHIPPED | OUTPATIENT
Start: 2020-12-08 | End: 2021-12-21 | Stop reason: SDUPTHER

## 2020-12-08 RX ORDER — ATORVASTATIN CALCIUM 20 MG/1
TABLET, FILM COATED ORAL
Qty: 90 TABLET | Refills: 3 | Status: SHIPPED | OUTPATIENT
Start: 2020-12-08 | End: 2021-12-21 | Stop reason: SDUPTHER

## 2020-12-08 ASSESSMENT — LIFESTYLE VARIABLES
AUDIT TOTAL SCORE: 4
HOW OFTEN DURING THE LAST YEAR HAVE YOU FAILED TO DO WHAT WAS NORMALLY EXPECTED FROM YOU BECAUSE OF DRINKING: 0
HAVE YOU OR SOMEONE ELSE BEEN INJURED AS A RESULT OF YOUR DRINKING: 0
HOW OFTEN DURING THE LAST YEAR HAVE YOU FOUND THAT YOU WERE NOT ABLE TO STOP DRINKING ONCE YOU HAD STARTED: 0
HOW OFTEN DURING THE LAST YEAR HAVE YOU BEEN UNABLE TO REMEMBER WHAT HAPPENED THE NIGHT BEFORE BECAUSE YOU HAD BEEN DRINKING: 0
HOW OFTEN DO YOU HAVE SIX OR MORE DRINKS ON ONE OCCASION: 0
HOW OFTEN DURING THE LAST YEAR HAVE YOU NEEDED AN ALCOHOLIC DRINK FIRST THING IN THE MORNING TO GET YOURSELF GOING AFTER A NIGHT OF HEAVY DRINKING: 0
HOW OFTEN DO YOU HAVE A DRINK CONTAINING ALCOHOL: 3
HOW OFTEN DURING THE LAST YEAR HAVE YOU HAD A FEELING OF GUILT OR REMORSE AFTER DRINKING: 0
HAS A RELATIVE, FRIEND, DOCTOR, OR ANOTHER HEALTH PROFESSIONAL EXPRESSED CONCERN ABOUT YOUR DRINKING OR SUGGESTED YOU CUT DOWN: 0
AUDIT-C TOTAL SCORE: 4
HOW MANY STANDARD DRINKS CONTAINING ALCOHOL DO YOU HAVE ON A TYPICAL DAY: 1

## 2020-12-08 ASSESSMENT — PATIENT HEALTH QUESTIONNAIRE - PHQ9
1. LITTLE INTEREST OR PLEASURE IN DOING THINGS: 0
SUM OF ALL RESPONSES TO PHQ9 QUESTIONS 1 & 2: 0
SUM OF ALL RESPONSES TO PHQ QUESTIONS 1-9: 0
2. FEELING DOWN, DEPRESSED OR HOPELESS: 0

## 2020-12-08 ASSESSMENT — ENCOUNTER SYMPTOMS
GASTROINTESTINAL NEGATIVE: 1
RESPIRATORY NEGATIVE: 1

## 2020-12-08 NOTE — PROGRESS NOTES
Subjective:      Patient ID: Florentino Obrien is a 63 y.o. male. HPI:    Chief Complaint   Patient presents with    6 Month Follow-Up    Medicare AWV    Anxiety     6 month eval and AMW. Doing well overall. BPs controlled. BP Readings from Last 3 Encounters:   12/08/20 122/66   10/08/20 126/80   06/09/20 116/68     Weight stable from June. Wt Readings from Last 3 Encounters:   12/08/20 205 lb 14.4 oz (93.4 kg)   10/08/20 211 lb 9.6 oz (96 kg)   06/09/20 204 lb 6.4 oz (92.7 kg)     Hx of COPD and lung cancer, follows closely with Pulm on a regular basis. Pt has been having increased anxiety lately and is requesting Xanax. He has used this in the past and did well. Patient Active Problem List   Diagnosis    HTN (hypertension)    IFG (impaired fasting glucose)    COPD (chronic obstructive pulmonary disease) (HCC)    Low HDL (under 40)    Situational anxiety    Squamous cell carcinoma of right lung ([facility]    Open wound of back, complicated     Past Surgical History:   Procedure Laterality Date    COLONOSCOPY      FRACTURE SURGERY      collar bone    LOBECTOMY Right 3/24/16    upper    LUNG BIOPSY Right 3/3/16    Dr Christine Beasley       Prior to Admission medications    Medication Sig Start Date End Date Taking? Authorizing Provider   ALPRAZolam Aj Weldon) 0.25 MG tablet Take 1 tablet by mouth daily as needed for Anxiety for up to 30 days.  12/8/20 1/7/21 Yes Ltanya Liming, DO   atorvastatin (LIPITOR) 20 MG tablet TAKE 1 TABLET BY MOUTH AT BEDTIME 12/8/20  Yes Ltanya Liming, DO   allopurinol (ZYLOPRIM) 100 MG tablet TAKE 1 TABLET BY MOUTH ONCE DAILY 12/8/20  Yes Ltanya Liming, DO   lisinopril (PRINIVIL;ZESTRIL) 10 MG tablet Take 1 tablet by mouth once daily 8/11/20  Yes Ltanya Liming, DO   albuterol sulfate  (90 Base) MCG/ACT inhaler Inhale 2 puffs into the lungs every 6 hours as needed for Wheezing or Shortness of Breath 6/9/20  Yes Gin Molina Carlee Love, DO   SPIRIVA RESPIMAT 2.5 MCG/ACT AERS inhaler INHALE 2 SPRAY(S) BY MOUTH ONCE DAILY 5/1/20  Yes Jay Alvarenga, DO   clobetasol (TEMOVATE) 0.05 % ointment Apply topically 2 times daily. 9/23/19  Yes Jay Alvarenga, DO   aspirin 81 MG tablet Take 81 mg by mouth daily. Yes Historical Provider, MD   omeprazole (PRILOSEC OTC) 20 MG tablet Take 20 mg by mouth daily.    Yes Historical Provider, MD       Lab Results   Component Value Date    LABA1C 5.7 11/23/2020     No results found for: EAG    No components found for: CHLPL  Lab Results   Component Value Date    TRIG 260 (H) 11/23/2020    TRIG 76 09/04/2019    TRIG 154 (H) 06/11/2018     Lab Results   Component Value Date    HDL 40 11/23/2020    HDL 36 (L) 09/04/2019    HDL 32 (L) 06/11/2018     Lab Results   Component Value Date    LDLCALC 47 11/23/2020    LDLCALC 80 09/04/2019    LDLCALC 66 06/11/2018     Lab Results   Component Value Date    LABVLDL 52 (H) 11/23/2020    LABVLDL 15 09/04/2019    LABVLDL 31 (H) 06/11/2018         Chemistry        Component Value Date/Time     11/23/2020 0740    K 4.0 11/23/2020 0740     11/23/2020 0740    CO2 22 11/23/2020 0740    BUN 14 11/23/2020 0740    CREATININE 1.34 (H) 11/23/2020 0740        Component Value Date/Time    CALCIUM 9.1 11/23/2020 0740    ALKPHOS 68 11/23/2020 0740    ALKPHOS 114 03/11/2019 0657    AST 32 11/23/2020 0740    ALT 26 11/23/2020 0740    BILITOT 0.4 11/23/2020 0740            Lab Results   Component Value Date    TSH 2.65 11/23/2020       Lab Results   Component Value Date    WBC 7.1 11/23/2020    HGB 14.2 11/23/2020    HCT 41.8 11/23/2020    MCV 94 11/23/2020     11/23/2020         Health Maintenance   Topic Date Due    Hepatitis C screen  1955    HIV screen  09/26/1972    DTaP/Tdap/Td vaccine (1 - Tdap) 09/26/1976    Shingles Vaccine (1 of 2) 09/26/2007    Annual Wellness Visit (AWV)  05/29/2019    A1C test (Diabetic or Prediabetic)  11/23/2021    Lipid screen  11/23/2021    Potassium monitoring  11/23/2021    Creatinine monitoring  11/23/2021    Colon cancer screen colonoscopy  04/02/2025    Flu vaccine  Completed    Hepatitis A vaccine  Aged Out    Hepatitis B vaccine  Aged Out    Hib vaccine  Aged Out    Meningococcal (ACWY) vaccine  Aged Out    Pneumococcal 0-64 years Vaccine  Aged [employer] History   Administered Date(s) Administered    Influenza Virus Vaccine 10/23/2018, 10/02/2019    Influenza, Quadv, IM, PF (6 mo and older Fluzone, Flulaval, Fluarix, and 3 yrs and older Afluria) 09/29/2020    Pneumococcal Polysaccharide ([id number]) 11/15/2017       Review of Systems   Constitutional: Negative. HENT: Negative. Respiratory: Negative. Cardiovascular: Negative. Gastrointestinal: Negative. Musculoskeletal: Negative. All other systems reviewed and are negative. Objective:   Physical Exam  Vitals signs and nursing note reviewed. Constitutional:       General: He is not in acute distress. Appearance: Normal appearance. He is well-developed. HENT:      Head: Normocephalic and atraumatic. Right Ear: Tympanic membrane normal.      Left Ear: Tympanic membrane normal.   Eyes:      Conjunctiva/sclera: Conjunctivae normal.   Neck:      Musculoskeletal: Neck supple. Cardiovascular:      Rate and Rhythm: Normal rate and regular rhythm. Heart sounds: Normal heart sounds. No murmur. Pulmonary:      Effort: Pulmonary effort is normal.      Breath sounds: Normal breath sounds. No wheezing, rhonchi or rales. Abdominal:      General: There is no distension. Skin:     General: Skin is warm and dry. Findings: No rash (on exposed surfaces). Neurological:      General: No focal deficit present. Mental Status: He is alert.    Psychiatric:         Attention and Perception: Attention normal.         Mood and Affect: Mood normal.         Speech: Speech normal.         Behavior: Behavior normal. Behavior is cooperative. Thought Content: Thought content normal.         Judgment: Judgment normal.         Assessment:       Diagnosis Orders   1. Routine general medical examination at a health care facility     2. Situational anxiety  ALPRAZolam (XANAX) 0.25 MG tablet   3. Other hyperlipidemia  atorvastatin (LIPITOR) 20 MG tablet   4. Idiopathic gout, unspecified chronicity, unspecified site  allopurinol (ZYLOPRIM) 100 MG tablet   5. IFG (impaired fasting glucose)     6. Essential hypertension     7. Low HDL (under 40)     8. Chronic obstructive pulmonary disease, unspecified COPD type ([facility] [address]     9. Squamous cell carcinoma of right lung ([facility] [address]     10. [facility]  Basic Metabolic Panel         Plan:      -  Chronic medical problems stable  -  Labs reviewed, overall ok other than decreased GFR of uncertain etiology  -  Pt is not taking NSAIDs on regular basis and is well hydrated  -  Continue current medications for now, doubt ACE-induced  -  Follow up with specialists as scheduled  -  Refill Xanax to take prn  -  Recheck labs 6 mos  -  RTO 6 mos          Marval Heimlich, DO  Medicare Annual Wellness Visit  Name: Dola Galeazzi Date: 2020   MRN: [id number] Sex: Male   Age: 63 y.o. Ethnicity: Non-/Non    : 1955 Race: Herman Gerber is here for 6 Month Follow-Up; Medicare AWV; and Anxiety    Screenings for behavioral, psychosocial and functional/safety risks, and cognitive dysfunction are all negative except as indicated below. These results, as well as other patient data from the [address] form, are documented in Flowsheets linked to this Encounter. Allergies   Allergen Reactions    Adhesive Tape          Prior to Visit Medications    Medication Sig Taking? Authorizing Provider   ALPRAZolam Gonzalo Pedraza) 0.25 MG tablet Take 1 tablet by mouth daily as needed for Anxiety for up to 30 days.  Yes Marval Heimlich, DO atorvastatin (LIPITOR) 20 MG tablet TAKE 1 TABLET BY MOUTH AT BEDTIME Yes Malia Porras DO   allopurinol (ZYLOPRIM) 100 MG tablet TAKE 1 TABLET BY MOUTH ONCE DAILY Yes Malia Porras DO   lisinopril (PRINIVIL;ZESTRIL) 10 MG tablet Take 1 tablet by mouth once daily Yes Malia Porras DO   albuterol sulfate  (90 Base) MCG/ACT inhaler Inhale 2 puffs into the lungs every 6 hours as needed for Wheezing or Shortness of Breath Yes Malia Porras DO   SPIRIVA RESPIMAT 2.5 MCG/ACT AERS inhaler INHALE 2 SPRAY(S) BY MOUTH ONCE DAILY Yes Malia Porras DO   clobetasol (TEMOVATE) 0.05 % ointment Apply topically 2 times daily. Yes Malia Porras DO   aspirin 81 MG tablet Take 81 mg by mouth daily. Yes Historical Provider, MD   omeprazole (PRILOSEC OTC) 20 MG tablet Take 20 mg by mouth daily.  Yes Historical Provider, MD         Past Medical History:   Diagnosis Date    Cancer ([facility] [address]     LUNG    COPD (chronic obstructive pulmonary disease) ([facility] [address]     GERD (gastroesophageal reflux disease)     Hypertension     Lung mass     RIGHT UPPER LOBE       Past Surgical History:   Procedure Laterality Date    COLONOSCOPY      FRACTURE SURGERY      collar bone    LOBECTOMY Right 3/24/16    upper    LUNG BIOPSY Right 3/3/16    Dr Finch Lab History   Problem Relation Age of Onset    Lung Cancer Mother 61    Heart Disease Father 61    Heart Disease Brother        CareTeam (Including outside providers/suppliers regularly involved in providing care):   Patient Care Team:  Malia Porras DO as PCP - General (Family Medicine)  Malia Porras DO as PCP - [facility] Empaneled Provider  Melania Hernandez MD as Consulting Physician (Pulmonary Disease)  Wesley Gore MD as Consulting Physician (Cardiology)  Aravind Johnson MD as Surgeon (Cardiothoracic Surgery)  Fadi Patel DO as Physician (Hematology and Oncology)    Wt Readings from Last 3 Encounters: 12/08/20 205 lb 14.4 oz (93.4 kg)   10/08/20 211 lb 9.6 oz (96 kg)   06/09/20 204 lb 6.4 oz (92.7 kg)     Vitals:    12/08/20 0847   BP: 122/66   Site: Left Upper Arm   Position: Sitting   Cuff Size: Small Adult   Pulse: 64   Resp: 16   Temp: 97.1 °F (36.2 °C)   TempSrc: Temporal   Weight: 205 lb 14.4 oz (93.4 kg)   Height: 5' 8.75\" (1.746 m)     Body mass index is 30.63 kg/m². Based upon direct observation of the patient, evaluation of cognition reveals recent and remote memory intact. Patient's complete Health Risk Assessment and screening values have been reviewed and are found in Flowsheets. The following problems were reviewed today and where indicated follow up appointments were made and/or referrals ordered. Positive Risk Factor Screenings with Interventions:       General Health and ACP:  General  In general, how would you say your health is?: Good  In the past 7 days, have you experienced any of the following?  New or Increased Pain, New or Increased Fatigue, Loneliness, Social Isolation, Stress or Anger?: None of These  Do you get the social and emotional support that you need?: Yes  Do you have a Living Will?: (!) No  Advance Directives     Power of [facility] Will ACP-Advance Directive ACP-Power of     Not on File Not on File Filed [address] [place] [address] Risk Interventions:  · No Living Will: Patient declines ACP discussion/assistance    Health Habits/Nutrition:  Health Habits/Nutrition  Do you exercise for at least 20 minutes 2-3 times per week?: Yes  Have you lost any weight without trying in the past 3 months?: No  Do you eat fewer than 2 meals per day?: No  Have you seen a dentist within the past year?: (!) No  Body mass index: (!) 30.62  Health Habits/Nutrition Interventions:  · Dental exam overdue:  patient encouraged to make appointment with his/her dentist    Hearing/Vision:  No exam data present  Hearing/Vision  Do you or your family notice any trouble with your

## 2020-12-08 NOTE — PATIENT INSTRUCTIONS
You may receive a survey regarding the care you received during your visit. Your input is valuable to us. We encourage you to complete and return your survey. We hope you will choose us in the future for your healthcare needs. Personalized Preventive Plan for Hamilton Parsons - 12/8/2020  Medicare offers a range of preventive health benefits. Some of the tests and screenings are paid in full while other may be subject to a deductible, co-insurance, and/or copay. Some of these benefits include a comprehensive review of your medical history including lifestyle, illnesses that may run in your family, and various assessments and screenings as appropriate. After reviewing your medical record and screening and assessments performed today your provider may have ordered immunizations, labs, imaging, and/or referrals for you. A list of these orders (if applicable) as well as your Preventive Care list are included within your After Visit Summary for your review. Other Preventive Recommendations:    · A preventive eye exam performed by an eye specialist is recommended every 1-2 years to screen for glaucoma; cataracts, macular degeneration, and other eye disorders. · A preventive dental visit is recommended every 6 months. · Try to get at least 150 minutes of exercise per week or 10,000 steps per day on a pedometer . · Order or download the FREE \"Exercise & Physical Activity: Your Everyday Guide\" from [employer]. Call 1-751.754.2045 or search [employer] online. · You need 6336-7801 mg of calcium and 9004-9312 IU of vitamin D per day. It is possible to meet your calcium requirement with diet alone, but a vitamin D supplement is usually necessary to meet this goal.  · When exposed to the sun, use a sunscreen that protects against both UVA and UVB radiation with an SPF of 30 or greater.  Reapply every 2 to 3 hours or after sweating, drying off with a towel, or swimming. · Always wear a seat belt when traveling in a car. Always wear a helmet when riding a bicycle or motorcycle.

## 2020-12-08 NOTE — PROGRESS NOTES
Shingles Vaccine (1 of 2) 09/26/2007    Annual Wellness Visit (AWV)  05/29/2019    A1C test (Diabetic or Prediabetic)  11/23/2021    Lipid screen  11/23/2021    Potassium monitoring  11/23/2021    Creatinine monitoring  11/23/2021    Colon cancer screen colonoscopy  04/02/2025    Flu vaccine  Completed    Hepatitis A vaccine  Aged Out    Hepatitis B vaccine  Aged Out    Hib vaccine  Aged Out    Meningococcal (ACWY) vaccine  Aged Out    Pneumococcal 0-64 years Vaccine  Aged Willie

## 2021-03-08 ENCOUNTER — HOSPITAL ENCOUNTER (OUTPATIENT)
Dept: CT IMAGING | Age: 66
Discharge: HOME | End: 2021-03-08
Payer: MEDICARE

## 2021-03-08 DIAGNOSIS — C34.11 MALIGNANT NEOPLASM OF BRONCHUS OF RIGHT UPPER LOBE (HCC): ICD-10-CM

## 2021-03-08 LAB — POC CREATININE WHOLE BLOOD: 1.4 MG/DL (ref 0.5–1.2)

## 2021-03-08 PROCEDURE — 71260 CT THORAX DX C+: CPT

## 2021-03-08 PROCEDURE — 6360000004 HC RX CONTRAST MEDICATION: Performed by: INTERNAL MEDICINE

## 2021-03-08 PROCEDURE — 82565 ASSAY OF CREATININE: CPT

## 2021-03-08 RX ADMIN — IOPAMIDOL 85 ML: 755 INJECTION, SOLUTION INTRAVENOUS at 07:02

## 2021-03-24 ENCOUNTER — IMMUNIZATION (OUTPATIENT)
Dept: PRIMARY CARE CLINIC | Age: 66
End: 2021-03-24
Payer: MEDICARE

## 2021-03-24 PROCEDURE — 91300 COVID-19, PFIZER VACCINE 30MCG/0.3ML DOSE: CPT | Performed by: PHARMACIST

## 2021-03-24 PROCEDURE — 0001A COVID-19, PFIZER VACCINE 30MCG/0.3ML DOSE: CPT | Performed by: PHARMACIST

## 2021-04-14 ENCOUNTER — IMMUNIZATION (OUTPATIENT)
Dept: PRIMARY CARE CLINIC | Age: 66
End: 2021-04-14
Payer: MEDICARE

## 2021-04-14 PROCEDURE — 91300 COVID-19, PFIZER VACCINE 30MCG/0.3ML DOSE: CPT | Performed by: FAMILY MEDICINE

## 2021-04-14 PROCEDURE — 0002A COVID-19, PFIZER VACCINE 30MCG/0.3ML DOSE: CPT | Performed by: FAMILY MEDICINE

## 2021-06-14 LAB
ANION GAP SERPL CALCULATED.3IONS-SCNC: 10 MMOL/L (ref 5–15)
BUN BLDV-MCNC: 10 MG/DL (ref 5–27)
CALCIUM SERPL-MCNC: 9.4 MG/DL (ref 8.5–10.5)
CHLORIDE BLD-SCNC: 104 MMOL/L (ref 98–109)
CO2: 27 MMOL/L (ref 22–32)
CREAT SERPL-MCNC: 1.23 MG/DL (ref 0.6–1.3)
EGFR AFRICAN AMERICAN: >60 ML/MIN/1.73SQ.M
EGFR IF NONAFRICAN AMERICAN: 59 ML/MIN/1.73SQ.M
GLUCOSE: 117 MG/DL (ref 65–99)
POTASSIUM SERPL-SCNC: 4.9 MMOL/L (ref 3.5–5)
SODIUM BLD-SCNC: 141 MMOL/L (ref 134–146)

## 2021-06-22 ENCOUNTER — OFFICE VISIT (OUTPATIENT)
Dept: FAMILY MEDICINE CLINIC | Age: 66
End: 2021-06-22
Payer: MEDICARE

## 2021-06-22 VITALS
RESPIRATION RATE: 18 BRPM | DIASTOLIC BLOOD PRESSURE: 72 MMHG | HEART RATE: 68 BPM | WEIGHT: 198 LBS | SYSTOLIC BLOOD PRESSURE: 116 MMHG | BODY MASS INDEX: 29.45 KG/M2

## 2021-06-22 DIAGNOSIS — E78.49 OTHER HYPERLIPIDEMIA: Primary | ICD-10-CM

## 2021-06-22 DIAGNOSIS — Z12.5 SCREENING FOR PROSTATE CANCER: ICD-10-CM

## 2021-06-22 DIAGNOSIS — E78.6 LOW HDL (UNDER 40): ICD-10-CM

## 2021-06-22 DIAGNOSIS — J44.9 CHRONIC OBSTRUCTIVE PULMONARY DISEASE, UNSPECIFIED COPD TYPE (HCC): ICD-10-CM

## 2021-06-22 DIAGNOSIS — N18.9 CHRONIC KIDNEY DISEASE, UNSPECIFIED CKD STAGE: ICD-10-CM

## 2021-06-22 DIAGNOSIS — M10.00 IDIOPATHIC GOUT, UNSPECIFIED CHRONICITY, UNSPECIFIED SITE: ICD-10-CM

## 2021-06-22 DIAGNOSIS — R73.01 IFG (IMPAIRED FASTING GLUCOSE): ICD-10-CM

## 2021-06-22 DIAGNOSIS — L30.1 DYSHIDROTIC ECZEMA: ICD-10-CM

## 2021-06-22 DIAGNOSIS — I10 ESSENTIAL HYPERTENSION: ICD-10-CM

## 2021-06-22 PROCEDURE — 99214 OFFICE O/P EST MOD 30 MIN: CPT | Performed by: FAMILY MEDICINE

## 2021-06-22 RX ORDER — LISINOPRIL 10 MG/1
TABLET ORAL
Qty: 90 TABLET | Refills: 3 | Status: SHIPPED | OUTPATIENT
Start: 2021-06-22 | End: 2022-08-08 | Stop reason: SDUPTHER

## 2021-06-22 RX ORDER — CLOBETASOL PROPIONATE 0.5 MG/G
OINTMENT TOPICAL
Qty: 15 G | Refills: 3 | Status: SHIPPED | OUTPATIENT
Start: 2021-06-22

## 2021-06-22 SDOH — ECONOMIC STABILITY: FOOD INSECURITY: WITHIN THE PAST 12 MONTHS, THE FOOD YOU BOUGHT JUST DIDN'T LAST AND YOU DIDN'T HAVE MONEY TO GET MORE.: NEVER TRUE

## 2021-06-22 SDOH — ECONOMIC STABILITY: FOOD INSECURITY: WITHIN THE PAST 12 MONTHS, YOU WORRIED THAT YOUR FOOD WOULD RUN OUT BEFORE YOU GOT MONEY TO BUY MORE.: NEVER TRUE

## 2021-06-22 ASSESSMENT — PATIENT HEALTH QUESTIONNAIRE - PHQ9
SUM OF ALL RESPONSES TO PHQ9 QUESTIONS 1 & 2: 0
SUM OF ALL RESPONSES TO PHQ QUESTIONS 1-9: 0
SUM OF ALL RESPONSES TO PHQ QUESTIONS 1-9: 0
2. FEELING DOWN, DEPRESSED OR HOPELESS: 0
1. LITTLE INTEREST OR PLEASURE IN DOING THINGS: 0
SUM OF ALL RESPONSES TO PHQ QUESTIONS 1-9: 0

## 2021-06-22 ASSESSMENT — ENCOUNTER SYMPTOMS
RESPIRATORY NEGATIVE: 1
GASTROINTESTINAL NEGATIVE: 1

## 2021-06-22 ASSESSMENT — SOCIAL DETERMINANTS OF HEALTH (SDOH): HOW HARD IS IT FOR YOU TO PAY FOR THE VERY BASICS LIKE FOOD, HOUSING, MEDICAL CARE, AND HEATING?: NOT HARD AT ALL

## 2021-06-22 NOTE — PROGRESS NOTES
Visit Information    Have you changed or started any medications since your last visit including any over-the-counter medicines, vitamins, or herbal medicines? no   Are you having any side effects from any of your medications? -  no  Have you stopped taking any of your medications? Is so, why? -  no    Have you seen any other physician or provider since your last visit? No  Have you had any other diagnostic tests since your last visit? Yes - Records Obtained  Have you been seen in the emergency room and/or had an admission to a hospital since we last saw you? No  Have you had your routine dental cleaning in the past 6 months? n/a    Have you activated your "[employer] account? If not, what are your barriers?  No: declined     Patient Care Team:  Sylvain Bonner DO as PCP - General (Family Medicine)  Sylvain Bonner DO as PCP - [facility] Provider  Brooke Andrews MD as Consulting Physician (Pulmonary Disease)  Yamile Negron MD as Consulting Physician (Cardiology)  Corey Arreola MD as Surgeon (Cardiothoracic Surgery)  Eddie De Luna DO as Physician (Hematology and Oncology)    Medical History Review  Past Medical, Family, and Social History reviewed and does contribute to the patient presenting condition    Health Maintenance   Topic Date Due    Hepatitis C screen  Never done    HIV screen  Never done    DTaP/Tdap/Td vaccine (1 - Tdap) Never done    Shingles Vaccine (1 of 2) Never done    A1C test (Diabetic or Prediabetic)  11/23/2021    Lipid screen  11/23/2021    Annual Wellness Visit (AWV)  12/09/2021    Potassium monitoring  06/14/2022    Creatinine monitoring  06/14/2022    Pneumococcal 0-64 years Vaccine (2 of 2) 11/15/2022    Colon cancer screen colonoscopy  04/02/2025    Flu vaccine  Completed    COVID-19 Vaccine  Completed    Hepatitis A vaccine  Aged Out    Hepatitis B vaccine  Aged Out    Hib vaccine  Aged Out    Meningococcal (ACWY) vaccine  Aged Out

## 2021-06-22 NOTE — PROGRESS NOTES
2021    Rin Rivera (:  1955) is a 63 y.o. male, here for a preventive medicine evaluation. Chief Complaint   Patient presents with    6 Month Follow-Up    Hypertension    Medication Refill    Discuss Labs     6 month eval.  Doing well overall. BP looks great. BP Readings from Last 3 Encounters:   21 116/72   20 122/66   10/08/20 126/80     Weight is down 7 lbs from last visit, limiting sugar. Wt Readings from Last 3 Encounters:   21 198 lb (89.8 kg)   20 205 lb 14.4 oz (93.4 kg)   10/08/20 211 lb 9.6 oz (96 kg)     No concerns today. Patient Active Problem List   Diagnosis    HTN (hypertension)    IFG (impaired fasting glucose)    COPD (chronic obstructive pulmonary disease) (HCC)    Low HDL (under 40)    Situational anxiety    Squamous cell carcinoma of right lung ([address]    Open wound of back, complicated       Review of Systems   Constitutional: Negative. HENT: Negative. Respiratory: Negative. Cardiovascular: Negative. Gastrointestinal: Negative. Musculoskeletal: Negative. All other systems reviewed and are negative. Prior to Visit Medications    Medication Sig Taking? Authorizing Provider   clobetasol (TEMOVATE) 0.05 % ointment Apply topically 2 times daily. Yes Buddy Send the Trend, DO   lisinopril (PRINIVIL;ZESTRIL) 10 MG tablet Take 1 tablet by mouth once daily Yes Buddy Send the Trend, DO   SPIRIVA RESPIMAT 2.5 MCG/ACT AERS inhaler INHALE 2 SPRAY(S) BY MOUTH ONCE DAILY Yes Buddy Miranda, DO   atorvastatin (LIPITOR) 20 MG tablet TAKE 1 TABLET BY MOUTH AT BEDTIME Yes Buddy Miranda, DO   allopurinol (ZYLOPRIM) 100 MG tablet TAKE 1 TABLET BY MOUTH ONCE DAILY Yes Buddy Miranda, DO   albuterol sulfate  (90 Base) MCG/ACT inhaler Inhale 2 puffs into the lungs every 6 hours as needed for Wheezing or Shortness of Breath Yes Buddy Miranda, DO   aspirin 81 MG tablet Take 81 mg by mouth daily.  Yes Historical Provider, MD   omeprazole (PRILOSEC OTC) 20 MG tablet Take 20 mg by mouth daily. Yes Historical Provider, MD        Allergies   Allergen Reactions    Adhesive Tape        Past Medical History:   Diagnosis Date    Cancer ([address]     LUNG    COPD (chronic obstructive pulmonary disease) (HCC)     GERD (gastroesophageal reflux disease)     Hypertension     Lung mass     RIGHT UPPER LOBE       Past Surgical History:   Procedure Laterality Date    COLONOSCOPY      FRACTURE SURGERY      collar bone    LOBECTOMY Right 3/24/16    upper    LUNG BIOPSY Right 3/3/16    Dr Nvaa Eryn History     Socioeconomic History    Marital status:      Spouse name: Robi Wolf Number of children: 1    Years of education: Not on file    Highest education level: Not on file   Occupational History    Not on file   Tobacco Use    Smoking status: Former Smoker     Packs/day: 0.00     Years: 41.00     Pack years: 0.00     Types: Cigarettes     Start date: 1973     Quit date: 3/24/2016     Years since quittin.2    Smokeless tobacco: Never Used   Vaping Use    Vaping Use: Never used   Substance and Sexual Activity    Alcohol use: Yes     Comment: occasionally on weekends while camping    Drug use: No    Sexual activity: Yes     Partners: Female   Other Topics Concern    Not on file   Social History Narrative    Not on file     Social Determinants of Health     Financial Resource Strain: Low Risk     Difficulty of Paying Living Expenses: Not hard at all   Food Insecurity: No Food Insecurity    Worried About Running Out of Food in the Last Year: Never true    Eliot of Food in the Last Year: Never true   Transportation Needs:     Lack of Transportation (Medical):      Lack of Transportation (Non-Medical):    Physical Activity:     Days of Exercise per Week:     Minutes of Exercise per Session:    Stress:     Feeling of Stress :    Social Connections:     Frequency of Communication with Friends and Family:     Frequency of Social Gatherings with Friends and Family:     Attends L[employer] Services:     Active Member of Clubs or Organizations:     Attends Club or Organization Meetings:     Marital Status:    Intimate Partner Violence:     Fear of Current or Ex-Partner:     Emotionally Abused:     Physically Abused:     Sexually Abused:         Family History   Problem Relation Age of Onset    Lung Cancer Mother 61    Heart Disease Father 61    Heart Disease Brother        ADVANCE DIRECTIVE: N, <no information>    Vitals:    06/22/21 0848   BP: 116/72   Site: Left Upper Arm   Position: Sitting   Cuff Size: Medium Adult   Pulse: 68   Resp: 18   Weight: 198 lb (89.8 kg)     Estimated body mass index is 29.45 kg/m² as calculated from the following:    Height as of 12/8/20: 5' 8.75\" (1.746 m). Weight as of this encounter: 198 lb (89.8 kg). Physical Exam  Vitals and nursing note reviewed. Constitutional:       General: He is not in acute distress. Appearance: Normal appearance. He is well-developed. HENT:      Head: Normocephalic and atraumatic. Right Ear: Tympanic membrane normal.      Left Ear: Tympanic membrane normal.   Eyes:      Conjunctiva/sclera: Conjunctivae normal.   Cardiovascular:      Rate and Rhythm: Normal rate and regular rhythm. Heart sounds: Normal heart sounds. No murmur heard. Pulmonary:      Effort: Pulmonary effort is normal.      Breath sounds: Normal breath sounds. No wheezing, rhonchi or rales. Abdominal:      General: There is no distension. Musculoskeletal:      Cervical back: Neck supple. Skin:     General: Skin is warm and dry. Findings: No rash (on exposed surfaces). Neurological:      General: No focal deficit present. Mental Status: He is alert.    Psychiatric:         Attention and Perception: Attention normal.         Mood and Affect: Mood normal.         Speech: Speech normal.         Behavior: vaccine  Aged Out    Hib vaccine  Aged Out    Meningococcal (ACWY) vaccine  Aged Out          ASSESSMENT/PLAN:  1. Other hyperlipidemia  -     Lipid Panel w/ Reflex Direct LDL; Future  -     TSH with Reflex; Future  2. Dyshidrotic eczema  -     clobetasol (TEMOVATE) 0.05 % ointment; Apply topically 2 times daily. , Disp-15 g, R-3, Normal  3. Essential hypertension  -     lisinopril (PRINIVIL;ZESTRIL) 10 MG tablet; Take 1 tablet by mouth once daily, Disp-90 tablet, R-3Normal  -     CBC Auto Differential; Future  4. Idiopathic gout, unspecified chronicity, unspecified site  5. IFG (impaired fasting glucose)  -     Comprehensive Metabolic Panel; Future  -     Hemoglobin A1C; Future  6. Chronic obstructive pulmonary disease, unspecified [facility]  7. Low HDL (under 40)  -     Lipid Panel w/ Reflex Direct LDL; Future  -     Comprehensive Metabolic Panel; Future  8. Chronic kidney disease, unspecified CKD stage  9. Screening for prostate cancer  -     PSA screening; Future    -  Chronic medical problems stable  -  Labs reviewed, GFR has improved slightly  -  Continue to limit NSAID use and stay hydrated  -  Doubt ACE induced but will continue to monitor  -  Continue current medications  -  Follow up with specialists as scheduled  -  Recheck labs 6 mos    Return in about 6 months (around 12/22/2021) for HTN. An electronic signature was used to authenticate this note.     --Asa Rolon DO on 6/22/2021 at 8:58 AM

## 2021-06-22 NOTE — PATIENT INSTRUCTIONS
You may receive a survey regarding the care you received during your visit. Your input is valuable to us. We encourage you to complete and return your survey. We hope you will choose us in the future for your healthcare needs. Patient Education        Learning About High Blood Pressure  What is high blood pressure? Blood pressure is a measure of how hard the blood pushes against the walls of your arteries. It's normal for blood pressure to go up and down throughout the day. But if it stays up, you have high blood pressure. Another name for high blood pressure is hypertension. Two numbers tell you your blood pressure. The first number is the systolic pressure (top number). It shows how hard the blood pushes when your heart is pumping. The second number is the diastolic pressure (bottom number). It shows how hard the blood pushes between heartbeats, when your heart is relaxed and filling with blood. Your doctor will give you a goal for your blood pressure based on your health and your age. High blood pressure (hypertension) means that the top number stays high, or the bottom number stays high, or both. High blood pressure increases the risk of stroke, heart attack, and other problems. What happens when you have high blood pressure? · Blood flows through your arteries with too much force. Over time, this can damage the heart and the walls of your arteries. But you can't feel it. High blood pressure usually doesn't cause symptoms. · High blood pressure makes your heart work harder. And that can lead to heart failure, which means your heart doesn't pump as much blood as your body needs. · Fat and calcium start to build up in your arteries. This buildup is called hardening of the arteries. It can cause many problems including a heart attack and stroke. · Arteries also carry blood and oxygen to organs like your eyes, kidneys, and brain.  If high blood pressure damages those arteries, it can lead to vision loss, kidney disease, stroke, and a higher risk of dementia. How can you prevent high blood pressure? · Stay at a healthy weight. · Try to limit how much sodium you eat to less than 2,300 milligrams (mg) a day. If you limit your sodium to 1,500 mg a day, you can lower your blood pressure even more. ? Buy foods that are labeled \"unsalted,\" \"sodium-free,\" or \"low-sodium. \" Foods labeled \"reduced-sodium\" and \"light sodium\" may still have too much sodium. ? Flavor your food with garlic, lemon juice, onion, vinegar, herbs, and spices instead of salt. Do not use soy sauce, steak sauce, onion salt, garlic salt, mustard, or ketchup on your food. ? Use less salt (or none) when recipes call for it. You can often use half the salt a recipe calls for without losing flavor. · Be physically active. Get at least 30 minutes of exercise on most days of the week. Walking is a good choice. You also may want to do other activities, such as running, swimming, cycling, or playing tennis or team sports. · Limit alcohol to 2 drinks a day for men and 1 drink a day for women. · Eat plenty of fruits, vegetables, and low-fat dairy products. Eat less saturated and total fats. How is high blood pressure treated? · Your doctor will suggest making lifestyle changes to help your heart. For example, your doctor may ask you to eat healthy foods, quit smoking, lose extra weight, and be more active. · If lifestyle changes don't help enough, your doctor may recommend that you take medicine. · When blood pressure is very high, medicines are needed to lower it. Follow-up care is a key part of your treatment and safety. Be sure to make and go to all appointments, and call your doctor if you are having problems. It's also a good idea to know your test results and keep a list of the medicines you take. Where can you learn more? Go to https://cosmo.Correlix. org and sign in to your [employer] account.  Enter P501 in the [address] Information box to learn more about \"Learning About High Blood Pressure. \"     If you do not have an account, please click on the \"Sign Up Now\" link. Current as of: August 31, 2020               Content Version: 12.9  © 5196-8819 Healthwise, Incorporated. Care instructions adapted under license by [facility] ([facility]). If you have questions about a medical condition or this instruction, always ask your healthcare professional. [address] any warranty or liability for your use of this information.

## 2021-09-13 NOTE — PROGRESS NOTES
[facility]    Patient: Deana Waterman, 61 y.o.   : 1955  10/12/2021    Patient of Dr. Chelle Crane   Patient presents with    Follow-up     1 year f/u, no testing         COPD  He complains of cough, shortness of breath, sputum production and wheezing (\"once in awhile\"). There is no chest tightness or hemoptysis. This is a chronic problem. The current episode started more than 1 year ago. The problem has been unchanged. The cough is productive of sputum (white frothy). Associated symptoms include dyspnea on exertion, postnasal drip and rhinorrhea. Pertinent negatives include no chest pain, fever, nasal congestion, sneezing, sore throat or weight loss. His symptoms are aggravated by strenuous activity and change in weather. His symptoms are alleviated by beta-agonist. He reports significant improvement on treatment. Risk factors for lung disease include animal exposure. His past medical history is significant for COPD and emphysema. Harper You is here for follow up for Stage 2 Moderate COPD and centrilobular emphysema. He has known stable 0.7 cm RML and 1 cm CELINA lung nodules. Overall patient reports respiratory symptoms have been stable since last appointment. Patient reports good compliance with inhaled medications (Spiriva). Patient using albuterol 2-3 times per week on average, depending on how active he is. Patient reports some physical limitation due to respiratory symptoms. His past medical history is significant for COPD, SCLCA status post RUL lobectomy in 2016, hypertension, and GERD. His most recent CT chest was completed on 2021 - no evidence of recurrent or metastatic disease, he follows with Dr. Sheryle Kelly (oncology). He is ordered for another CT Chest in 2022.     MMRC Dyspnea Scale:   0: Dyspneic on strenuous exercise  1: Dyspneic on walking a slight hill  2: Dyspneic on walking level ground; must stop occasionally due to breathlessness  3: Must stop for breathlessness after walking 100 yards or after a few minutes  4: Cannot leave house; breathless on dressing/undressing    MMRC dyspnea score: 0    Progress History:   Since last visit any new medical issues? No  New ER or hospital visits? No  Any new or changes in medicines? No  Using inhalers? Yes Spiriva and as needed albuterol  Are they helpful? Yes   Previous inhalers? Anoro- had HA  Any recent exacerbations? No  Last PFT: 10/1/2020  Last 6 MWT: 10/6/2016  Last A1AT: MM    Smoking History:  41 pack year history, quit in 2016    Social History:  Patient job history: On disability, was a   He possibly has had exposure to aerosolized particles or hazardous fumes. (Coal, dust, asbestos, molds ie Hay)  Denies living on a farm that primarily raised crops, livestock or combination  Admits to exposure to pets/animals at home. 1 dog  Denies exposure to tuberculosis.     Flu vaccine: vaccinated 10/4/2021  Pneumonia vaccine: [id number] 11/15/2017  COVID-19 vaccine: Vaccinated  Past Medical hx   PMH:  Past Medical History:   Diagnosis Date    Cancer ([facility] [address]     LUNG    COPD (chronic obstructive pulmonary disease) (HCC)     GERD (gastroesophageal reflux disease)     Hypertension     Lung mass     RIGHT UPPER LOBE     SURGICAL HISTORY:  Past Surgical History:   Procedure Laterality Date    COLONOSCOPY      FRACTURE SURGERY      collar bone    LOBECTOMY Right 3/24/16    upper    LUNG BIOPSY Right 3/3/16    Dr Melva Smith HISTORY:  Social History     Tobacco Use    Smoking status: Former Smoker     Packs/day: 0.00     Years: 41.00     Pack years: 0.00     Types: Cigarettes     Start date: 1973     Quit date: 3/24/2016     Years since quittin.5    Smokeless tobacco: Never Used   Vaping Use    Vaping Use: Never used   Substance Use Topics    Alcohol use: Yes     Comment: occasionally on weekends while camping    Drug use: No     ALLERGIES:  Allergies   Allergen Reactions    Adhesive Tape      FAMILY HISTORY:  Family History   Problem Relation Age of Onset    Lung Cancer Mother 61    Heart Disease Father 61    Heart Disease Brother      CURRENT MEDICATIONS:  Current Outpatient Medications   Medication Sig Dispense Refill    clobetasol (TEMOVATE) 0.05 % ointment Apply topically 2 times daily. 15 g 3    lisinopril (PRINIVIL;ZESTRIL) 10 MG tablet Take 1 tablet by mouth once daily 90 tablet 3    SPIRIVA RESPIMAT 2.5 MCG/ACT AERS inhaler INHALE 2 SPRAY(S) BY MOUTH ONCE DAILY 4 g 5    atorvastatin (LIPITOR) 20 MG tablet TAKE 1 TABLET BY MOUTH AT BEDTIME 90 tablet 3    allopurinol (ZYLOPRIM) 100 MG tablet TAKE 1 TABLET BY MOUTH ONCE DAILY 90 tablet 3    albuterol sulfate  (90 Base) MCG/ACT inhaler Inhale 2 puffs into the lungs every 6 hours as needed for Wheezing or Shortness of Breath 1 Inhaler 5    aspirin 81 MG tablet Take 81 mg by mouth daily.  omeprazole (PRILOSEC OTC) 20 MG tablet Take 20 mg by mouth daily. No current facility-administered medications for this visit. Juan DUKE   Review of Systems   Constitutional: Negative for chills, fever, unexpected weight change and weight loss. HENT: Positive for postnasal drip and rhinorrhea. Negative for congestion, sinus pressure, sinus pain, sneezing and sore throat. Eyes: Negative for itching. Respiratory: Positive for cough, sputum production, shortness of breath and wheezing (\"once in awhile\"). Negative for hemoptysis and chest tightness. Cardiovascular: Positive for dyspnea on exertion. Negative for chest pain, palpitations and leg swelling. Gastrointestinal: Negative for abdominal pain, constipation, diarrhea and nausea. Genitourinary: Negative for difficulty urinating. Allergic/Immunologic: Negative for environmental allergies.         Physical exam   /84   Pulse 74   Ht 5' 8\" (1.727 m)   Wt 189 lb (85.7 kg)   SpO2 98% the past 15 years. Screening should be discontinued once a person has not smoked for 15 years or develops a health problem that substantially limits life expectancy or the ability or willingness to have curative lung surgery. CT Chest 3/8/2021 (Reviewed) no CT evidence of current or metastatic disease  Narrative   PROCEDURE: CT CHEST W CONTRAST       CLINICAL INFORMATION: Malignant neoplasm of bronchus of right upper lobe (HCC)        COMPARISON: Multiple priors for comparison, the most recent dated 3/10/2020       TECHNIQUE:  Axial CT images were obtained through the chest following the intravenous administration of 85 mL Isovue 370 contrast. Coronal and sagittal reformatted images were rendered. All CT scans at this facility use dose modulation, iterative    reconstruction, and/or weight-based dosing when appropriate to reduce radiation dose to as low as reasonably achievable.       FINDINGS:        There is evidence of prior right upper lobectomy.       The remaining central airways otherwise appear patent.       There is a parenchymal linear opacity demonstrated at the right upper lung zone likely representing postsurgical scarring.       There is mild upper zone predominant centrilobular emphysema.       No pleural effusion or pneumothorax is seen.       No lymphadenopathy is seen.       Limited evaluation of the upper abdomen demonstrates exophytic low-density lesions arising off the kidneys. These are fluid attenuation and may represent renal cysts. There is also diffuse fatty filtration of the visualized liver.       No acute osseous findings are seen.         Impression   1. Postsurgical changes from prior right upper lobectomy are again seen. There is no CT evidence of recurrent or metastatic disease.               **This report has been created using voice recognition software.  It may contain minor errors which are inherent in voice recognition technology. **       Final report electronically signed by Dr. King Early on 3/8/2021 8:22 AM       Spirometry 10/1/2020 (Reviewed) Stage 2 Moderate COPD      Assessment      Diagnosis Orders   1. Stage 2 moderate COPD by GOLD classification ([address]  Spirometry With Bronchodilator   2. Pulmonary emphysema, unspecified emphysema type ([address]  Spirometry With Bronchodilator   3. Squamous cell carcinoma of right lung ([address]           Plan   1. Stage 2 moderate COPD by GOLD classification ([address]  - Well controlled on spiriva and as needed albuterol. Continue at current doses. Patient prefers these to be filled by Dr. Antonio Roe.  - Patient does report an increase in his sputum productive with his cough. Advised patient to obtain Mucinex over the counter. He may take 1, 600 mg tablet, twice a day. - Spirometry With Bronchodilator prior to next appointment  - Advised to maintain pneumonia vaccine with PCP   - Flu vaccine up to date     2. Pulmonary emphysema, unspecified emphysema type ([address]  - Well controlled on spiriva and as needed albuterol. Continue at current doses. - Spirometry With Bronchodilator prior to next appointment    3. Squamous cell carcinoma of right lung (HCC)  - Stable CT Chest this year, advised patient to continue with Dr. Janeen Saucedo  - CT chest scheduled for March 2022    Advised patient to call office with any changes, questions, or concerns regarding respiratory status or issues with prescribed medications    Return in about 1 year (around 10/12/2022) for COPD with spirometry prior.        Electronically signed by DEMI Jones CNP on 10/12/2021 at 8:33 AM

## 2021-10-12 ENCOUNTER — OFFICE VISIT (OUTPATIENT)
Dept: PULMONOLOGY | Age: 66
End: 2021-10-12
Payer: MEDICARE

## 2021-10-12 VITALS
DIASTOLIC BLOOD PRESSURE: 84 MMHG | WEIGHT: 189 LBS | HEIGHT: 68 IN | OXYGEN SATURATION: 98 % | SYSTOLIC BLOOD PRESSURE: 122 MMHG | HEART RATE: 74 BPM | BODY MASS INDEX: 28.64 KG/M2

## 2021-10-12 DIAGNOSIS — J44.9 STAGE 2 MODERATE COPD BY GOLD CLASSIFICATION (HCC): Primary | ICD-10-CM

## 2021-10-12 DIAGNOSIS — J43.9 PULMONARY EMPHYSEMA, UNSPECIFIED EMPHYSEMA TYPE (HCC): ICD-10-CM

## 2021-10-12 DIAGNOSIS — C34.91 SQUAMOUS CELL CARCINOMA OF RIGHT LUNG (HCC): ICD-10-CM

## 2021-10-12 PROCEDURE — 99214 OFFICE O/P EST MOD 30 MIN: CPT

## 2021-10-12 ASSESSMENT — ENCOUNTER SYMPTOMS
DIARRHEA: 0
SHORTNESS OF BREATH: 1
SPUTUM PRODUCTION: 1
NAUSEA: 0
CONSTIPATION: 0
ABDOMINAL PAIN: 0
HEMOPTYSIS: 0
SINUS PAIN: 0
SORE THROAT: 0
SINUS PRESSURE: 0
EYE ITCHING: 0
COUGH: 1
CHEST TIGHTNESS: 0
RHINORRHEA: 1
WHEEZING: 1

## 2021-10-12 ASSESSMENT — COPD QUESTIONNAIRES: COPD: 1

## 2021-10-12 NOTE — PATIENT INSTRUCTIONS
Continue you spiriva and as needed albuterol. You may start to taking plain Mucinex over the counter. You can take 1, 600 mg tablet, twice a day. This should help with thinning your secretions. We will update your spirometry lung test in 1 year prior to your appointment. Chronic Obstructive Pulmonary Disease (COPD): Care Instructions  Your Care Instructions     Chronic obstructive pulmonary disease (COPD) is a general term for a group of lung diseases, including emphysema and chronic bronchitis. People with COPD have decreased airflow in and out of the lungs, which makes it hard to breathe. The airways also can get clogged with thick mucus. Cigarette smoking is a major cause of COPD. Although there is no cure for COPD, you can slow its progress. Following your treatment plan and taking care of yourself can help you feel better and live longer. Follow-up care is a key part of your treatment and safety. Be sure to make and go to all appointments, and call your doctor if you are having problems. It's also a good idea to know your test results and keep a list of the medicines you take. How can you care for yourself at home? Staying healthy    · Do not smoke. This is the most important step you can take to prevent more damage to your lungs. If you need help quitting, talk to your doctor about stop-smoking programs and medicines. These can increase your chances of quitting for good. · Avoid colds and flu. Get a pneumococcal vaccine shot. If you have had one before, ask your doctor whether you need a second dose. Get the flu vaccine every fall. If you must be around people with colds or the flu, wash your hands often. · Avoid secondhand smoke, air pollution, and high altitudes. Also avoid cold, dry air and hot, humid air. Stay at home with your windows closed when air pollution is bad. Medicines and oxygen therapy    1. Take your medicines exactly as prescribed.  Call your doctor if you think you are having a problem with your medicine. You may be taking medicines such as:  ? Bronchodilators. These help open your airways and make breathing easier. They are either short-acting (work for 6 to 9 hours) or long-acting (work for 24 hours). You inhale most bronchodilators, so they start to act quickly. Always carry your quick-relief inhaler with you in case you need it while you are away from home. ? Corticosteroids (prednisone, budesonide). These reduce airway inflammation. They come in pill or inhaled form. You must take these medicines every day for them to work well. · Ask your doctor or pharmacist if a spacer is right for you. A spacer may help you get more inhaled medicine to your lungs. If you use one, ask how to use it properly. · Do not take any vitamins, over-the-counter medicine, or herbal products without talking to your doctor first.     · If your doctor prescribed antibiotics, take them as directed. Do not stop taking them just because you feel better. You need to take the full course of antibiotics. · If you use oxygen therapy, use the flow rate your doctor has recommended. Don't change it without talking to your doctor first. Oxygen therapy boosts the amount of oxygen in your blood and helps you breathe easier. Activity    · Get regular exercise. Walking is an easy way to get exercise. Start out slowly, and walk a little more each day. · Pay attention to your breathing. You are exercising too hard if you can't talk while you exercise. · Take short rest breaks when doing household chores and other activities. · Learn breathing methods--such as breathing through pursed lips--to help you become less short of breath. · If your doctor has not set you up with a pulmonary rehabilitation program, ask if rehab is right for you. Rehab includes exercise programs, education about your disease and how to manage it, help with diet and other changes, and emotional support.    Diet · Eat regular, healthy meals. Use bronchodilators about 1 hour before you eat to make it easier to eat. Eat several small meals instead of three large ones. Drink beverages at the end of the meal. Avoid foods that are hard to chew. · Eat foods that contain protein so you don't lose muscle mass. · Talk with your doctor if you gain too much weight or if you lose weight without trying. Mental health    · Talk to your family, friends, or a therapist about your feelings. Some people feel frightened, angry, hopeless, helpless, and even guilty. Talking openly about bad feelings can help you cope. If these feelings last, talk to your doctor. When should you call for help? Call 911 anytime you think you may need emergency care. For example, call if:    · You have severe trouble breathing. Call your doctor now or seek immediate medical care if:    · You have new or worse trouble breathing. · You cough up blood. · You have a fever. Watch closely for changes in your health, and be sure to contact your doctor if:    · You cough more deeply or more often, especially if you notice more mucus or a change in the color of your mucus. · You have new or worse swelling in your legs or belly. · You are not getting better as expected. Where can you learn more? Go to https://Food Reporter.Samba Ads. org and sign in to your Socialare account. Enter M462 in the [facility] box to learn more about \"Chronic Obstructive Pulmonary Disease (COPD): Care Instructions. \"     If you do not have an account, please click on the \"Sign Up Now\" link. Current as of: October 26, 2020               Content Version: 12.9  © 0581-8016 Healthwise, [employer]. Care instructions adapted under license by [facility] ([facility]).  If you have questions about a medical condition or this instruction, always ask your healthcare professional. [address]  any warranty or liability for your use of this information.

## 2021-12-09 LAB
ABSOLUTE BASO #: 0.1 X10E9/L (ref 0–0.2)
ABSOLUTE EOS #: 0.2 X10E9/L (ref 0–0.4)
ABSOLUTE LYMPH #: 1.9 X10E9/L (ref 1–3.5)
ABSOLUTE MONO #: 0.4 X10E9/L (ref 0–0.9)
ABSOLUTE NEUT #: 2.3 X10E9/L (ref 1.5–6.6)
ALBUMIN SERPL-MCNC: 4.3 G/DL (ref 3.2–5.3)
ALK PHOSPHATASE: 72 U/L (ref 39–130)
ALT SERPL-CCNC: 22 U/L (ref 0–40)
ANION GAP SERPL CALCULATED.3IONS-SCNC: 7 MMOL/L (ref 5–15)
AST SERPL-CCNC: 32 U/L (ref 0–41)
AVERAGE GLUCOSE: 114 MG/DL
BASOPHILS RELATIVE PERCENT: 1.4 %
BILIRUB SERPL-MCNC: 0.6 MG/DL (ref 0.3–1.2)
BUN BLDV-MCNC: 14 MG/DL (ref 5–27)
CALCIUM SERPL-MCNC: 9.2 MG/DL (ref 8.5–10.5)
CHLORIDE BLD-SCNC: 102 MMOL/L (ref 98–109)
CHOLESTEROL/HDL RATIO: 3.3 (ref 1–5)
CHOLESTEROL: 124 MG/DL (ref 150–200)
CO2: 30 MMOL/L (ref 22–32)
CREAT SERPL-MCNC: 1.22 MG/DL (ref 0.6–1.3)
EGFR AFRICAN AMERICAN: >60 ML/MIN/1.73SQ.M
EGFR IF NONAFRICAN AMERICAN: 60 ML/MIN/1.73SQ.M
EOSINOPHILS RELATIVE PERCENT: 5.1 %
GLUCOSE: 97 MG/DL (ref 65–99)
HBA1C MFR BLD: 5.6 % (ref 4.4–6.4)
HCT VFR BLD CALC: 41.7 % (ref 39–49)
HDLC SERPL-MCNC: 38 MG/DL
HEMOGLOBIN: 13.6 G/DL (ref 13–17)
LDL CHOLESTEROL CALCULATED: ABNORMAL MG/DL
LDL CHOLESTEROL DIRECT: 38 MG/DL
LDL/HDL RATIO: ABNORMAL
LYMPHOCYTE %: 39.8 %
MCH RBC QN AUTO: 31.7 PG (ref 27–34)
MCHC RBC AUTO-ENTMCNC: 32.6 G/DL (ref 32–36)
MCV RBC AUTO: 97 FL (ref 80–100)
MONOCYTES # BLD: 7.2 %
NEUTROPHILS RELATIVE PERCENT: 46.5 %
PDW BLD-RTO: 13.6 % (ref 11.5–15)
PLATELETS: 247 X10E9/L (ref 150–450)
PMV BLD AUTO: 7.5 FL (ref 7–12)
POTASSIUM SERPL-SCNC: 5.3 MMOL/L (ref 3.5–5)
PSA, ULTRASENSITIVE: 0.83 NG/ML (ref 0–4)
RBC: 4.3 X10E12/L (ref 4.1–5.7)
SODIUM BLD-SCNC: 139 MMOL/L (ref 134–146)
TOTAL PROTEIN: 7 G/DL (ref 6–8)
TRIGL SERPL-MCNC: 408 MG/DL (ref 27–150)
TSH SERPL DL<=0.05 MIU/L-ACNC: 1.66 UIU/ML (ref 0.49–4.67)
VLDLC SERPL CALC-MCNC: 82 MG/DL (ref 0–30)
WBC: 4.9 X10E9/L (ref 4–11)

## 2021-12-21 ENCOUNTER — OFFICE VISIT (OUTPATIENT)
Dept: FAMILY MEDICINE CLINIC | Age: 66
End: 2021-12-21
Payer: MEDICARE

## 2021-12-21 VITALS
DIASTOLIC BLOOD PRESSURE: 70 MMHG | HEART RATE: 68 BPM | BODY MASS INDEX: 27.25 KG/M2 | HEIGHT: 69 IN | RESPIRATION RATE: 16 BRPM | SYSTOLIC BLOOD PRESSURE: 128 MMHG | WEIGHT: 184 LBS

## 2021-12-21 DIAGNOSIS — R73.01 IFG (IMPAIRED FASTING GLUCOSE): ICD-10-CM

## 2021-12-21 DIAGNOSIS — E78.6 LOW HDL (UNDER 40): ICD-10-CM

## 2021-12-21 DIAGNOSIS — F41.8 SITUATIONAL ANXIETY: ICD-10-CM

## 2021-12-21 DIAGNOSIS — N18.9 CHRONIC KIDNEY DISEASE, UNSPECIFIED CKD STAGE: ICD-10-CM

## 2021-12-21 DIAGNOSIS — E78.49 OTHER HYPERLIPIDEMIA: ICD-10-CM

## 2021-12-21 DIAGNOSIS — J44.9 CHRONIC OBSTRUCTIVE PULMONARY DISEASE, UNSPECIFIED COPD TYPE (HCC): ICD-10-CM

## 2021-12-21 DIAGNOSIS — Z00.00 ROUTINE GENERAL MEDICAL EXAMINATION AT A HEALTH CARE FACILITY: Primary | ICD-10-CM

## 2021-12-21 DIAGNOSIS — M10.00 IDIOPATHIC GOUT, UNSPECIFIED CHRONICITY, UNSPECIFIED SITE: ICD-10-CM

## 2021-12-21 DIAGNOSIS — I10 ESSENTIAL HYPERTENSION: ICD-10-CM

## 2021-12-21 PROCEDURE — G0439 PPPS, SUBSEQ VISIT: HCPCS | Performed by: FAMILY MEDICINE

## 2021-12-21 RX ORDER — ATORVASTATIN CALCIUM 20 MG/1
TABLET, FILM COATED ORAL
Qty: 90 TABLET | Refills: 3 | Status: SHIPPED | OUTPATIENT
Start: 2021-12-21

## 2021-12-21 RX ORDER — ALLOPURINOL 100 MG/1
TABLET ORAL
Qty: 90 TABLET | Refills: 3 | Status: SHIPPED | OUTPATIENT
Start: 2021-12-21

## 2021-12-21 ASSESSMENT — PATIENT HEALTH QUESTIONNAIRE - PHQ9
1. LITTLE INTEREST OR PLEASURE IN DOING THINGS: 0
SUM OF ALL RESPONSES TO PHQ9 QUESTIONS 1 & 2: 0
2. FEELING DOWN, DEPRESSED OR HOPELESS: 0
SUM OF ALL RESPONSES TO PHQ QUESTIONS 1-9: 0

## 2021-12-21 ASSESSMENT — ENCOUNTER SYMPTOMS
GASTROINTESTINAL NEGATIVE: 1
RESPIRATORY NEGATIVE: 1

## 2021-12-21 ASSESSMENT — LIFESTYLE VARIABLES
HAS A RELATIVE, FRIEND, DOCTOR, OR ANOTHER HEALTH PROFESSIONAL EXPRESSED CONCERN ABOUT YOUR DRINKING OR SUGGESTED YOU CUT DOWN: 0
HOW OFTEN DURING THE LAST YEAR HAVE YOU BEEN UNABLE TO REMEMBER WHAT HAPPENED THE NIGHT BEFORE BECAUSE YOU HAD BEEN DRINKING: 0
AUDIT TOTAL SCORE: 3
HOW OFTEN DO YOU HAVE SIX OR MORE DRINKS ON ONE OCCASION: 0
HOW OFTEN DO YOU HAVE A DRINK CONTAINING ALCOHOL: 3
HOW OFTEN DURING THE LAST YEAR HAVE YOU FAILED TO DO WHAT WAS NORMALLY EXPECTED FROM YOU BECAUSE OF DRINKING: 0
HOW MANY STANDARD DRINKS CONTAINING ALCOHOL DO YOU HAVE ON A TYPICAL DAY: 0
HOW OFTEN DURING THE LAST YEAR HAVE YOU HAD A FEELING OF GUILT OR REMORSE AFTER DRINKING: 0
HOW OFTEN DURING THE LAST YEAR HAVE YOU FOUND THAT YOU WERE NOT ABLE TO STOP DRINKING ONCE YOU HAD STARTED: 0
HAVE YOU OR SOMEONE ELSE BEEN INJURED AS A RESULT OF YOUR DRINKING: 0
AUDIT-C TOTAL SCORE: 3
HOW OFTEN DURING THE LAST YEAR HAVE YOU NEEDED AN ALCOHOLIC DRINK FIRST THING IN THE MORNING TO GET YOURSELF GOING AFTER A NIGHT OF HEAVY DRINKING: 0

## 2021-12-21 NOTE — PROGRESS NOTES
MD   omeprazole (PRILOSEC OTC) 20 MG tablet Take 20 mg by mouth daily. Yes Historical Provider, MD        Allergies   Allergen Reactions    Adhesive Tape        Past Medical History:   Diagnosis Date    Cancer ([address]     LUNG    COPD (chronic obstructive pulmonary disease) (HCC)     GERD (gastroesophageal reflux disease)     Hypertension     Lung mass     RIGHT UPPER LOBE       Past Surgical History:   Procedure Laterality Date    COLONOSCOPY      FRACTURE SURGERY      collar bone    LOBECTOMY Right 3/24/16    upper    LUNG BIOPSY Right 3/3/16    Dr Luis Todd History     Socioeconomic History    Marital status:      Spouse name: Demian Ledbetter Number of children: 1    Years of education: Not on file    Highest education level: Not on file   Occupational History    Not on file   Tobacco Use    Smoking status: Former Smoker     Packs/day: 0.00     Years: 41.00     Pack years: 0.00     Types: Cigarettes     Start date: 1973     Quit date: 3/24/2016     Years since quittin.7    Smokeless tobacco: Never Used   Vaping Use    Vaping Use: Never used   Substance and Sexual Activity    Alcohol use: Yes     Comment: occasionally on weekends while camping    Drug use: No    Sexual activity: Yes     Partners: Female   Other Topics Concern    Not on file   Social History Narrative    Not on file     Social Determinants of Health     Financial Resource Strain: Low Risk     Difficulty of Paying Living Expenses: Not hard at all   Food Insecurity: No Food Insecurity    Worried About Running Out of Food in the Last Year: Never true    Eliot of Food in the Last Year: Never true   Transportation Needs:     Lack of Transportation (Medical): Not on file    Lack of Transportation (Non-Medical):  Not on file   Physical Activity:     Days of Exercise per Week: Not on file    Minutes of Exercise per Session: Not on file   Stress:     Feeling of Stress : Not on file Social Connections:     Frequency of Communication with Friends and Family: Not on file    Frequency of Social Gatherings with Friends and Family: Not on file    Attends Jew Services: Not on file    Active Member of Clubs or Organizations: Not on file    Attends Club or Organization Meetings: Not on file    Marital Status: Not on file   Intimate Partner Violence:     Fear of Current or Ex-Partner: Not on file    Emotionally Abused: Not on file    Physically Abused: Not on file    Sexually Abused: Not on file   Housing Stability:     Unable to Pay for Housing in the Last Year: Not on file    Number of [place] in the Last Year: Not on file    Unstable Housing in the Last Year: Not on file        Family History   Problem Relation Age of Onset    Lung Cancer Mother 61    Heart Disease Father 61    Heart Disease Brother        ADVANCE DIRECTIVE: N, <no information>    Vitals:    12/21/21 0843   BP: 128/70   Site: Left Upper Arm   Position: Sitting   Cuff Size: Large Adult   Pulse: 68   Resp: 16   Weight: 184 lb (83.5 kg)   Height: 5' 8.75\" (1.746 m)     Estimated body mass index is 27.37 kg/m² as calculated from the following:    Height as of this encounter: 5' 8.75\" (1.746 m). Weight as of this encounter: 184 lb (83.5 kg). Physical Exam  Vitals and nursing note reviewed. Constitutional:       General: He is not in acute distress. Appearance: Normal appearance. He is well-developed. HENT:      Head: Normocephalic and atraumatic. Right Ear: Tympanic membrane normal.      Left Ear: Tympanic membrane normal.   Eyes:      Conjunctiva/sclera: Conjunctivae normal.   Cardiovascular:      Rate and Rhythm: Normal rate and regular rhythm. Heart sounds: Normal heart sounds. No murmur heard. Pulmonary:      Effort: Pulmonary effort is normal.      Breath sounds: Normal breath sounds. No wheezing, rhonchi or rales. Abdominal:      General: There is no distension. Musculoskeletal:      Cervical back: Neck supple. Skin:     General: Skin is warm and dry. Findings: No rash (on exposed surfaces). Neurological:      General: No focal deficit present. Mental Status: He is alert. Psychiatric:         Attention and Perception: Attention normal.         Mood and Affect: Mood normal.         Speech: Speech normal.         Behavior: Behavior normal. Behavior is cooperative. Thought Content: Thought content normal.         Judgment: Judgment normal.         No flowsheet data found. Lab Results   Component Value Date    CHOL 124 12/09/2021    CHOL 139 11/23/2020    CHOL 131 09/04/2019    CHOL 208 04/06/2018    CHOL 162 02/06/2016    CHOL 149 05/08/2015    TRIG 408 12/09/2021    TRIG 260 11/23/2020    TRIG 76 09/04/2019    HDL 38 12/09/2021    HDL 40 11/23/2020    HDL 36 09/04/2019    LDLCALC See Note 12/09/2021    LDLCALC 47 11/23/2020    LDLCALC 80 09/04/2019    GLUCOSE 97 12/09/2021    LABA1C 5.6 12/09/2021    LABA1C 5.7 11/23/2020    LABA1C 5.9 09/04/2019       The ASCVD Risk score (Venecia Garay, et al., 2013) failed to calculate for the following reasons:     The valid total cholesterol range is 130 to 320 mg/dL    Immunization History   Administered Date(s) Administered    COVID-19, Pfizer, PF, 30mcg/0.3mL 03/24/2021, 04/14/2021    Influenza Virus Vaccine 10/23/2018, 10/02/2019, 10/04/2021    Influenza, Quadv, IM, PF (6 mo and older Fluzone, Flulaval, Fluarix, and 3 yrs and older Afluria) 09/29/2020, 10/04/2021    Pneumococcal Polysaccharide ([id number]) 11/15/2017       Health Maintenance   Topic Date Due    Hepatitis C screen  Never done    HIV screen  Never done    DTaP/Tdap/Td vaccine (1 - Tdap) Never done    Shingles Vaccine (1 of 2) Never done    COVID-19 Vaccine (3 - Booster for Maharaj Peter series) 10/14/2021    Annual Wellness Visit (AWV)  12/09/2021    Pneumococcal 0-64 years Vaccine (2 of 2 - PPSV23) 11/15/2022    A1C test (Diabetic or Prediabetic)  2022    Lipid screen  2022    Potassium monitoring  2022    Creatinine monitoring  2022    Colon cancer screen colonoscopy  2025    Flu vaccine  Completed    Hepatitis A vaccine  Aged Out    Hepatitis B vaccine  Aged Out    Hib vaccine  Aged Out    Meningococcal (ACWY) vaccine  Aged Out          ASSESSMENT/PLAN:  1. Routine general medical examination at a health care facility  2. Idiopathic gout, unspecified chronicity, unspecified site  -     allopurinol (ZYLOPRIM) 100 MG tablet; TAKE 1 TABLET BY MOUTH ONCE DAILY, Disp-90 tablet, R-3Please consider 90 day supplies to promote better adherenceNormal  3. Other hyperlipidemia  -     atorvastatin (LIPITOR) 20 MG tablet; TAKE 1 TABLET BY MOUTH AT BEDTIME, Disp-90 tablet, R-3Normal  4. Essential hypertension  5. IFG (impaired fasting glucose)  6. Chronic kidney disease, unspecified CKD stage  7. Low HDL (under 40)  8. Chronic obstructive pulmonary disease, unspecified COPD type ([facility] [address]  9. Situational anxiety    -  Chronic medical problems stable  -  Labs reviewed, look fine  -  Continue current medications  -  Follow up with specialists as scheduled      Return in 6 months (on 2022) for HTN. An electronic signature was used to authenticate this note. --Pushpa Ceja DO on 2021 at 8:59 AM    Medicare Annual Wellness Visit  Name: Delisa Freeman Date: 2021   MRN: [id number] Sex: Male   Age: 61 y.o. Ethnicity: Non- / Non    : 1955 Race: White (non-)      Aquinovictorino Soto is here for Medicare AWV, 6 Month Follow-Up, and Results    Screenings for behavioral, psychosocial and functional/safety risks, and cognitive dysfunction are all negative except as indicated below. These results, as well as other patient data from the [address] form, are documented in Flowsheets linked to this Encounter.     Allergies   Allergen Reactions    Adhesive Tape Prior to Visit Medications    Medication Sig Taking? Authorizing Provider   allopurinol (ZYLOPRIM) 100 MG tablet TAKE 1 TABLET BY MOUTH ONCE DAILY Yes Skip Thomas DO   atorvastatin (LIPITOR) 20 MG tablet TAKE 1 TABLET BY MOUTH AT BEDTIME Yes Skip Thomas DO   clobetasol (TEMOVATE) 0.05 % ointment Apply topically 2 times daily. Yes Skip Thomas DO   lisinopril (PRINIVIL;ZESTRIL) 10 MG tablet Take 1 tablet by mouth once daily Yes Skip Thomas DO   SPIRIVA RESPIMAT 2.5 MCG/ACT AERS inhaler INHALE 2 SPRAY(S) BY MOUTH ONCE DAILY Yes Skip Thomas DO   albuterol sulfate  (90 Base) MCG/ACT inhaler Inhale 2 puffs into the lungs every 6 hours as needed for Wheezing or Shortness of Breath Yes Skip Thomas DO   aspirin 81 MG tablet Take 81 mg by mouth daily. Yes Historical Provider, MD   omeprazole (PRILOSEC OTC) 20 MG tablet Take 20 mg by mouth daily.  Yes Historical Provider, MD         Past Medical History:   Diagnosis Date    Cancer ([facility] [address]     LUNG    COPD (chronic obstructive pulmonary disease) ([facility]     GERD (gastroesophageal reflux disease)     Hypertension     Lung mass     RIGHT UPPER LOBE       Past Surgical History:   Procedure Laterality Date    COLONOSCOPY      FRACTURE SURGERY      collar bone    LOBECTOMY Right 3/24/16    upper    LUNG BIOPSY Right 3/3/16    Dr Shreya Burns History   Problem Relation Age of Onset    Lung Cancer Mother 61    Heart Disease Father 61    Heart Disease Brother        CareTeam (Including outside providers/suppliers regularly involved in providing care):   Patient Care Team:  Skip Thomas DO as PCP - General (Family Medicine)  Skip Thomas DO as PCP - [facility] Markel Lopez Provider  Kelli Kim MD as Consulting Physician (Pulmonary Disease)  Aristides Guillen MD as Consulting Physician (Cardiology)  Ximena Lyman MD as Surgeon (Cardiothoracic Surgery)  Camila Berry DO as Physician (Hematology and Oncology)    Wt Readings from Last 3 Encounters:   12/21/21 184 lb (83.5 kg)   10/12/21 189 lb (85.7 kg)   06/22/21 198 lb (89.8 kg)     Vitals:    12/21/21 0843   BP: 128/70   Site: Left Upper Arm   Position: Sitting   Cuff Size: Large Adult   Pulse: 68   Resp: 16   Weight: 184 lb (83.5 kg)   Height: 5' 8.75\" (1.746 m)     Body mass index is 27.37 kg/m². Based upon direct observation of the patient, evaluation of cognition reveals recent and remote memory intact. Patient's complete Health Risk Assessment and screening values have been reviewed and are found in Flowsheets. The following problems were reviewed today and where indicated follow up appointments were made and/or referrals ordered. Positive Risk Factor Screenings with Interventions:            General Health and ACP:  General  In general, how would you say your health is?: Good  In the past 7 days, have you experienced any of the following?  New or Increased Pain, New or Increased Fatigue, Loneliness, Social Isolation, Stress or Anger?: None of These  Do you get the social and emotional support that you need?: Yes  Do you have a Living Will?: (!) No  Advance Directives     Power of [address] [place] [address] Will ACP-Advance Directive ACP-Power of     Not on File Not on File Not on File Not on File      General Health Risk Interventions:  · No Living Will: Patient declines ACP discussion/assistance    Health Habits/Nutrition:  Health Habits/Nutrition  Do you exercise for at least 20 minutes 2-3 times per week?: (!) No  Have you lost any weight without trying in the past 3 months?: No  Do you eat only one meal per day?: No  Have you seen the dentist within the past year?: N/A - wear dentures  Body mass index: (!) 27.37  Health Habits/Nutrition Interventions:  · Inadequate physical activity:  educational materials provided to promote increased physical activity     Safety:  Safety  Do you have working smoke detectors?: Yes  Have all throw rugs been removed or fastened?: Yes  Do you have non-slip mats or surfaces in all bathtubs/showers?: Yes  Do all of your stairways have a railing or banister?: (!) No (\"no stairs\")  Are your doorways, halls and stairs free of clutter?: Yes  Do you always fasten your seatbelt when you are in a car?: Yes  Safety Interventions:  · Home safety tips provided     Personalized Preventive Plan   Current Health Maintenance Status  Immunization History   Administered Date(s) Administered    COVID-19, Nicko Garcia, PF, 30mcg/0.3mL 03/24/2021, 04/14/2021    Influenza Virus Vaccine 10/23/2018, 10/02/2019, 10/04/2021    Influenza, Quadv, IM, PF (6 mo and older Fluzone, Flulaval, Fluarix, and 3 yrs and older Afluria) 09/29/2020, 10/04/2021    Pneumococcal Polysaccharide ([id number]) 11/15/2017        Health Maintenance   Topic Date Due    Hepatitis C screen  Never done    HIV screen  Never done    DTaP/Tdap/Td vaccine (1 - Tdap) Never done    Shingles Vaccine (1 of 2) Never done    COVID-19 Vaccine (3 - Booster for Maharaj Peter series) 10/14/2021    Annual Wellness Visit (AWV)  12/09/2021    Pneumococcal 0-64 years Vaccine (2 of 2 - PPSV23) 11/15/2022    A1C test (Diabetic or Prediabetic)  12/09/2022    Lipid screen  12/09/2022    Potassium monitoring  12/09/2022    Creatinine monitoring  12/09/2022    Colon cancer screen colonoscopy  04/02/2025    Flu vaccine  Completed    Hepatitis A vaccine  Aged Out    Hepatitis B vaccine  Aged Out    Hib vaccine  Aged Out    Meningococcal (ACWY) vaccine  Aged Out     Recommendations for ReCellular Due: see orders and patient instructions/AVS.  . Recommended screening schedule for the next 5-10 years is provided to the patient in written form: see Patient Instructions/AVS.    Trey Armando was seen today for medicare awv, 6 month follow-up and results.     Diagnoses and all orders for this visit:    Routine general medical examination at a [facility] facility    Idiopathic gout, unspecified chronicity, unspecified site  -     allopurinol (ZYLOPRIM) 100 MG tablet; TAKE 1 TABLET BY MOUTH ONCE DAILY    Other hyperlipidemia  -     atorvastatin (LIPITOR) 20 MG tablet; TAKE 1 TABLET BY MOUTH AT BEDTIME    Essential hypertension    IFG (impaired fasting glucose)    Chronic kidney disease, unspecified CKD stage    Low HDL (under 40)    Chronic obstructive pulmonary disease, unspecified COPD type ([facility]    Situational anxiety

## 2021-12-21 NOTE — PATIENT INSTRUCTIONS
You may receive a survey about your visit with us today. The feedback from our patients helps us identify what is working well and where the service to all patients can be enhanced. Thank you! Personalized Preventive Plan for Lenka Gordon - 12/21/2021  Medicare offers a range of preventive health benefits. Some of the tests and screenings are paid in full while other may be subject to a deductible, co-insurance, and/or copay. Some of these benefits include a comprehensive review of your medical history including lifestyle, illnesses that may run in your family, and various assessments and screenings as appropriate. After reviewing your medical record and screening and assessments performed today your provider may have ordered immunizations, labs, imaging, and/or referrals for you. A list of these orders (if applicable) as well as your Preventive Care list are included within your After Visit Summary for your review. Other Preventive Recommendations:    · A preventive eye exam performed by an eye specialist is recommended every 1-2 years to screen for glaucoma; cataracts, macular degeneration, and other eye disorders. · A preventive dental visit is recommended every 6 months. · Try to get at least 150 minutes of exercise per week or 10,000 steps per day on a pedometer . · Order or download the FREE \"Exercise & Physical Activity: Your Everyday Guide\" from [employer]. Call 1-377.688.5131 or search [employer] online. · You need 9348-2420 mg of calcium and 3782-2401 IU of vitamin D per day. It is possible to meet your calcium requirement with diet alone, but a vitamin D supplement is usually necessary to meet this goal.  · When exposed to the sun, use a sunscreen that protects against both UVA and UVB radiation with an SPF of 30 or greater. Reapply every 2 to 3 hours or after sweating, drying off with a towel, or swimming.   · Always wear a seat belt when traveling in a car. Always wear a helmet when riding a bicycle or motorcycle.

## 2022-01-03 RX ORDER — TIOTROPIUM BROMIDE INHALATION SPRAY 3.12 UG/1
SPRAY, METERED RESPIRATORY (INHALATION)
Qty: 4 G | Refills: 5 | Status: SHIPPED | OUTPATIENT
Start: 2022-01-03 | End: 2022-08-15

## 2022-01-03 NOTE — TELEPHONE ENCOUNTER
The patient called in requesting a refill on his Spiriva to go to [address]. Order pended for your signature.     If no call back the patient will check with his pharmacy today after 2pm

## 2022-03-07 ENCOUNTER — HOSPITAL ENCOUNTER (OUTPATIENT)
Dept: CT IMAGING | Age: 67
Discharge: HOME | End: 2022-03-07
Payer: MEDICARE

## 2022-03-07 DIAGNOSIS — C34.11 MALIGNANT NEOPLASM OF UPPER LOBE OF RIGHT LUNG (HCC): ICD-10-CM

## 2022-03-07 LAB — POC CREATININE WHOLE BLOOD: 1.1 MG/DL (ref 0.5–1.2)

## 2022-03-07 PROCEDURE — 71260 CT THORAX DX C+: CPT

## 2022-03-07 PROCEDURE — 82565 ASSAY OF CREATININE: CPT

## 2022-03-07 PROCEDURE — 6360000004 HC RX CONTRAST MEDICATION: Performed by: INTERNAL MEDICINE

## 2022-03-07 RX ADMIN — IOPAMIDOL 80 ML: 755 INJECTION, SOLUTION INTRAVENOUS at 07:43

## 2022-06-21 ENCOUNTER — OFFICE VISIT (OUTPATIENT)
Dept: FAMILY MEDICINE CLINIC | Age: 67
End: 2022-06-21
Payer: MEDICARE

## 2022-06-21 VITALS
RESPIRATION RATE: 16 BRPM | SYSTOLIC BLOOD PRESSURE: 122 MMHG | BODY MASS INDEX: 26.78 KG/M2 | DIASTOLIC BLOOD PRESSURE: 68 MMHG | HEART RATE: 64 BPM | WEIGHT: 180 LBS

## 2022-06-21 DIAGNOSIS — E78.6 LOW HDL (UNDER 40): ICD-10-CM

## 2022-06-21 DIAGNOSIS — N18.9 CHRONIC KIDNEY DISEASE, UNSPECIFIED CKD STAGE: ICD-10-CM

## 2022-06-21 DIAGNOSIS — I10 ESSENTIAL HYPERTENSION: Primary | ICD-10-CM

## 2022-06-21 DIAGNOSIS — F41.8 SITUATIONAL ANXIETY: ICD-10-CM

## 2022-06-21 DIAGNOSIS — J44.9 CHRONIC OBSTRUCTIVE PULMONARY DISEASE, UNSPECIFIED COPD TYPE (HCC): ICD-10-CM

## 2022-06-21 DIAGNOSIS — R73.01 IFG (IMPAIRED FASTING GLUCOSE): ICD-10-CM

## 2022-06-21 DIAGNOSIS — M10.00 IDIOPATHIC GOUT, UNSPECIFIED CHRONICITY, UNSPECIFIED SITE: ICD-10-CM

## 2022-06-21 DIAGNOSIS — E78.49 OTHER HYPERLIPIDEMIA: ICD-10-CM

## 2022-06-21 DIAGNOSIS — Z12.5 SCREENING FOR PROSTATE CANCER: ICD-10-CM

## 2022-06-21 PROCEDURE — 99214 OFFICE O/P EST MOD 30 MIN: CPT | Performed by: FAMILY MEDICINE

## 2022-06-21 ASSESSMENT — ENCOUNTER SYMPTOMS
RESPIRATORY NEGATIVE: 1
GASTROINTESTINAL NEGATIVE: 1

## 2022-06-21 ASSESSMENT — PATIENT HEALTH QUESTIONNAIRE - PHQ9
SUM OF ALL RESPONSES TO PHQ QUESTIONS 1-9: 0
SUM OF ALL RESPONSES TO PHQ9 QUESTIONS 1 & 2: 0
2. FEELING DOWN, DEPRESSED OR HOPELESS: 0
1. LITTLE INTEREST OR PLEASURE IN DOING THINGS: 0
SUM OF ALL RESPONSES TO PHQ QUESTIONS 1-9: 0

## 2022-06-21 NOTE — PROGRESS NOTES
2022    Hamilton Parsons (:  1955) is a 61 y.o. male, here for a preventive medicine evaluation. Chief Complaint   Patient presents with    6 Month Follow-Up     6 month eval.  Doing well overall. BPs ok. BP Readings from Last 3 Encounters:   22 122/68   21 128/70   10/12/21 122/84     Weight is down again, has been trying to lose. Wt Readings from Last 3 Encounters:   22 180 lb (81.6 kg)   21 184 lb (83.5 kg)   10/12/21 189 lb (85.7 kg)     Hx of COPD, breathing stable. Patient Active Problem List   Diagnosis    HTN (hypertension)    IFG (impaired fasting glucose)    Stage 2 moderate COPD by GOLD classification (HCC)    Low HDL (under 40)    Situational anxiety    Squamous cell carcinoma of right lung ([facility] [address]    Open wound of back, complicated       Review of Systems   Constitutional: Negative. HENT: Negative. Respiratory: Negative. Cardiovascular: Negative. Gastrointestinal: Negative. Musculoskeletal: Negative. All other systems reviewed and are negative. Prior to Visit Medications    Medication Sig Taking? Authorizing Provider   SPIRIVA RESPIMAT 2.5 MCG/ACT AERS inhaler INHALE 2 SPRAY(S) BY MOUTH ONCE DAILY Yes Ciro Hernandez DO   allopurinol (ZYLOPRIM) 100 MG tablet TAKE 1 TABLET BY MOUTH ONCE DAILY Yes Ciro Hernandez DO   atorvastatin (LIPITOR) 20 MG tablet TAKE 1 TABLET BY MOUTH AT BEDTIME Yes Ciro Hernandez, DO   clobetasol (TEMOVATE) 0.05 % ointment Apply topically 2 times daily. Yes Ciro Hernandez DO   lisinopril (PRINIVIL;ZESTRIL) 10 MG tablet Take 1 tablet by mouth once daily Yes Ciro Hernandez, DO   albuterol sulfate  (90 Base) MCG/ACT inhaler Inhale 2 puffs into the lungs every 6 hours as needed for Wheezing or Shortness of Breath Yes Ciro Hernandez DO   aspirin 81 MG tablet Take 81 mg by mouth daily.  Yes Historical Provider, MD   omeprazole (PRILOSEC OTC) 20 MG tablet Take 20 mg by mouth daily. Yes Historical Provider, MD        Allergies   Allergen Reactions    Adhesive Tape        Past Medical History:   Diagnosis Date    Cancer ([facility] [address]     LUNG    COPD (chronic obstructive pulmonary disease) (HCC)     GERD (gastroesophageal reflux disease)     Hypertension     Lung mass     RIGHT UPPER LOBE       Past Surgical History:   Procedure Laterality Date    COLONOSCOPY      FRACTURE SURGERY      collar bone    LOBECTOMY Right 3/24/16    upper    LUNG BIOPSY Right 3/3/16    Dr Tierney Durant History     Socioeconomic History    Marital status:      Spouse name: Amadou Lord Number of children: 1    Years of education: Not on file    Highest education level: Not on file   Occupational History    Not on file   Tobacco Use    Smoking status: Former Smoker     Packs/day: 0.00     Years: 41.00     Pack years: 0.00     Types: Cigarettes     Start date: 1973     Quit date: 3/24/2016     Years since quittin.2    Smokeless tobacco: Never Used   Vaping Use    Vaping Use: Never used   Substance and Sexual Activity    Alcohol use: Yes     Comment: occasionally on weekends while camping    Drug use: No    Sexual activity: Yes     Partners: Female   Other Topics Concern    Not on file   Social History Narrative    Not on file     Social Determinants of Health     Financial Resource Strain: Low Risk     Difficulty of Paying Living Expenses: Not hard at all   Food Insecurity: No Food Insecurity    Worried About Running Out of Food in the Last Year: Never true    Eliot of Food in the Last Year: Never true   Transportation Needs:     Lack of Transportation (Medical): Not on file    Lack of Transportation (Non-Medical):  Not on file   Physical Activity:     Days of Exercise per Week: Not on file    Minutes of Exercise per Session: Not on file   Stress:     Feeling of Stress : Not on file   Social Connections:     Frequency of Communication with Friends and Family: Not on file    Frequency of Social Gatherings with Friends and Family: Not on file    Attends [employer] Services: Not on file    Active Member of Clubs or Organizations: Not on file    Attends Club or Organization Meetings: Not on file    Marital Status: Not on file   Intimate Partner Violence:     Fear of Current or Ex-Partner: Not on file    Emotionally Abused: Not on file    Physically Abused: Not on file    Sexually Abused: Not on file   Housing Stability:     Unable to Pay for Housing in the Last Year: Not on file    Number of [place] in the Last Year: Not on file    Unstable Housing in the Last Year: Not on file        Family History   Problem Relation Age of Onset    Lung Cancer Mother 61    Heart Disease Father 61    Heart Disease Brother        ADVANCE DIRECTIVE: N, <no information>    Vitals:    06/21/22 0817   BP: 122/68   Site: Left Upper Arm   Position: Sitting   Cuff Size: Medium Adult   Pulse: 64   Resp: 16   Weight: 180 lb (81.6 kg)     Estimated body mass index is 26.78 kg/m² as calculated from the following:    Height as of 12/21/21: 5' 8.75\" (1.746 m). Weight as of this encounter: 180 lb (81.6 kg). Physical Exam  Vitals and nursing note reviewed. Constitutional:       General: He is not in acute distress. Appearance: Normal appearance. He is well-developed. HENT:      Head: Normocephalic and atraumatic. Right Ear: Tympanic membrane normal.      Left Ear: Tympanic membrane normal.   Eyes:      Conjunctiva/sclera: Conjunctivae normal.   Cardiovascular:      Rate and Rhythm: Normal rate and regular rhythm. Heart sounds: Normal heart sounds. No murmur heard. Pulmonary:      Effort: Pulmonary effort is normal.      Breath sounds: Normal breath sounds. No wheezing, rhonchi or rales. Abdominal:      General: There is no distension. Musculoskeletal:      Cervical back: Neck supple. Skin:     General: Skin is warm and dry. Findings: No rash (on exposed surfaces). Neurological:      General: No focal deficit present. Mental Status: He is alert. Psychiatric:         Attention and Perception: Attention normal.         Mood and Affect: Mood normal.         Speech: Speech normal.         Behavior: Behavior normal. Behavior is cooperative. Thought Content: Thought content normal.         Judgment: Judgment normal.         No flowsheet data found. Lab Results   Component Value Date    CHOL 124 12/09/2021    CHOL 139 11/23/2020    CHOL 131 09/04/2019    CHOL 208 04/06/2018    CHOL 162 02/06/2016    CHOL 149 05/08/2015    TRIG 408 12/09/2021    TRIG 260 11/23/2020    TRIG 76 09/04/2019    HDL 38 12/09/2021    HDL 40 11/23/2020    HDL 36 09/04/2019    LDLCALC See Note 12/09/2021    LDLCALC 47 11/23/2020    LDLCALC 80 09/04/2019    GLUCOSE 97 12/09/2021    LABA1C 5.6 12/09/2021    LABA1C 5.7 11/23/2020    LABA1C 5.9 09/04/2019       The ASCVD Risk score (Aquilino Virgen., et al., 2013) failed to calculate for the following reasons:     The valid total cholesterol range is 130 to 320 mg/dL    Immunization History   Administered Date(s) Administered    COVID-19, Pfizer Purple top, DILUTE for use, 12+ yrs, 30mcg/0.3mL dose 03/24/2021, 04/14/2021    Influenza Virus Vaccine 10/23/2018, 10/02/2019, 10/04/2021    Influenza, Quadv, IM, PF (6 mo and older Fluzone, Flulaval, Fluarix, and 3 yrs and older Afluria) 09/29/2020, 10/04/2021    Pneumococcal Polysaccharide ([id number]) 11/15/2017       Health Maintenance   Topic Date Due    HIV screen  Never done    Hepatitis C screen  Never done    DTaP/Tdap/Td vaccine (1 - Tdap) Never done    Shingles vaccine (1 of 2) Never done    Pneumococcal 0-64 years Vaccine (2 - PCV) 11/15/2018    COVID-19 Vaccine (3 - Booster for Pfizer series) 09/14/2021    A1C test (Diabetic or Prediabetic)  12/09/2022    Lipids  12/09/2022    Prostate Specific Antigen (PSA) Screening or Monitoring 12/09/2022    Depression Screen  12/21/2022    Annual Wellness Visit (AWV)  12/22/2022    Colorectal Cancer Screen  04/02/2025    Flu vaccine  Completed    Hepatitis A vaccine  Aged Out    Hepatitis B vaccine  Aged Out    Hib vaccine  Aged Out    Meningococcal (ACWY) vaccine  Aged Out       Assessment & Plan   Essential hypertension  -     CBC with Auto Differential; Future  IFG (impaired fasting glucose)  -     Comprehensive Metabolic Panel; Future  -     Hemoglobin A1C; Future  Other hyperlipidemia  -     Lipid Panel w/ Reflex Direct LDL; Future  -     Comprehensive Metabolic Panel; Future  -     TSH with Reflex; Future  Idiopathic gout, unspecified chronicity, unspecified site  Chronic kidney disease, unspecified CKD stage  Low HDL (under 40)  Chronic obstructive pulmonary disease, unspecified COPD type ([facility]  Situational anxiety  Screening for prostate cancer  -     PSA Screening;  Future    -  Chronic medical problems stable  -  Continue current medications  -  Follow up with specialists as scheduled  -  Check labs 6 mos  Return in about 6 months (around 12/21/2022) for HTN.         --Aracelis Jimenez,

## 2022-08-08 DIAGNOSIS — I10 ESSENTIAL HYPERTENSION: ICD-10-CM

## 2022-08-08 RX ORDER — LISINOPRIL 10 MG/1
TABLET ORAL
Qty: 90 TABLET | Refills: 3 | Status: SHIPPED | OUTPATIENT
Start: 2022-08-08

## 2022-08-08 NOTE — TELEPHONE ENCOUNTER
Patient requesting refill of Lisinopril to [facility].  Will check with pharmacy after 1pm.  Please refill if appropriate

## 2022-08-15 RX ORDER — TIOTROPIUM BROMIDE INHALATION SPRAY 3.12 UG/1
SPRAY, METERED RESPIRATORY (INHALATION)
Qty: 1 EACH | Refills: 11 | Status: SHIPPED | OUTPATIENT
Start: 2022-08-15

## 2022-10-03 NOTE — PROGRESS NOTES
[facility]    Patient: Karen Dougherty, 74 y.o.   : 1955  10/11/2022    Patient of Dr. Rocío Pereira   Patient presents with    Follow-up     COPD 1 year f/u with emelina 10/4/22        Landon Parada is here for follow up for Moderate COPD and centrilobular emphysema. He is here with spirometry that is stable. Overall patient reports respiratory symptoms have been stable since last appointment. Patient reports good compliance with inhaled medications (Spiriva). Patient using albuterol 1-2 times per week on average. Patient reports some physical limitation due to respiratory symptoms. His past medical history is significant for COPD, SCLCA status post RUL lobectomy in 2016, hypertension, and GERD. His most recent CT chest was completed on 2022 - no evidence of recurrent or metastatic disease. He completed surveillance with Dr. Alexa Pickard. COPD  He complains of chest tightness (sharp when short of breath), cough, shortness of breath and wheezing. There is no hemoptysis or sputum production. This is a chronic problem. The current episode started more than 1 year ago. The problem occurs every several days. The problem has been unchanged. The cough is non-productive. Associated symptoms include dyspnea on exertion, rhinorrhea and sneezing. Pertinent negatives include no appetite change, chest pain (sharp), fever or postnasal drip. His symptoms are aggravated by change in weather and strenuous activity (yard work). His symptoms are alleviated by beta-agonist. He reports significant improvement on treatment. Risk factors for lung disease include animal exposure and smoking/tobacco exposure. His past medical history is significant for COPD and emphysema.        MMRC Dyspnea Scale:   0: Dyspneic on strenuous exercise  1: Dyspneic on walking a slight hill  2: Dyspneic on walking level ground; must stop occasionally due to breathlessness  3: Must stop for breathlessness after walking 100 yards or after a few minutes  4: Cannot leave house; breathless on dressing/undressing    MMRC dyspnea score: 0    Progress History:   Since last visit any new medical issues? No  New ER or hospital visits? No  Any new or changes in medicines? No  Using inhalers? Yes Spiriva and as needed albuterol   Are they helpful? Yes   Previous inhalers? Anoro - headaches  Any recent exacerbations? No  Last PFT: 10/4/22 - moderate COPD   Last 6 MWT: 10/6/2016  Last A1AT: MM     Smoking History:  41 pack year history, quit in 2016  His significant other smokes outdoors. Social History:  Patient job history: On disability, was a   He possibly has had exposure to aerosolized particles or hazardous fumes. (Coal, dust, asbestos, molds ie Hay)  Denies living on a farm that primarily raised crops, livestock or combination  Admits to exposure to pets/animals at home. 1 dog  Denies exposure to tuberculosis. Flu vaccine:has not received this year, has upcoming appt with primary care provider   Pneumonia vaccine: [id number] 11/15/2017  COVID-19 vaccine: Vaccinated  Past Medical hx   PMH:  Past Medical History:   Diagnosis Date    Cancer ([facility] [address]     LUNG    COPD (chronic obstructive pulmonary disease) (HCC)     GERD (gastroesophageal reflux disease)     Hypertension     Lung mass     RIGHT UPPER LOBE     SURGICAL HISTORY:  Past Surgical History:   Procedure Laterality Date    COLONOSCOPY      FRACTURE SURGERY      collar bone    LOBECTOMY Right 3/24/16    upper    LUNG BIOPSY Right 3/3/16    Dr Savanna Plummer HISTORY:  Social History     Tobacco Use    Smoking status: Former     Packs/day: 0.00     Years: 41.00     Pack years: 0.00     Types: Cigarettes     Start date: 1973     Quit date: 3/24/2016     Years since quittin.5    Smokeless tobacco: Never   Vaping Use    Vaping Use: Never used   Substance Use Topics    Alcohol use:  Yes Comment: occasionally on weekends while camping    Drug use: No     ALLERGIES:  Allergies   Allergen Reactions    Adhesive Tape      FAMILY HISTORY:  Family History   Problem Relation Age of Onset    Lung Cancer Mother 61    Heart Disease Father 61    Heart Disease Brother      CURRENT MEDICATIONS:  Current Outpatient Medications   Medication Sig Dispense Refill    SPIRIVA RESPIMAT 2.5 MCG/ACT AERS inhaler INHALE 2 PUFFS BY MOUTH ONCE DAILY 1 each 11    lisinopril (PRINIVIL;ZESTRIL) 10 MG tablet Take 1 tablet by mouth once daily 90 tablet 3    allopurinol (ZYLOPRIM) 100 MG tablet TAKE 1 TABLET BY MOUTH ONCE DAILY 90 tablet 3    atorvastatin (LIPITOR) 20 MG tablet TAKE 1 TABLET BY MOUTH AT BEDTIME 90 tablet 3    clobetasol (TEMOVATE) 0.05 % ointment Apply topically 2 times daily. 15 g 3    albuterol sulfate  (90 Base) MCG/ACT inhaler Inhale 2 puffs into the lungs every 6 hours as needed for Wheezing or Shortness of Breath 1 Inhaler 5    aspirin 81 MG tablet Take 81 mg by mouth daily. omeprazole (PRILOSEC OTC) 20 MG tablet Take 20 mg by mouth daily. No current facility-administered medications for this visit. [facility]   Review of Systems   Constitutional:  Negative for appetite change and fever. HENT:  Positive for congestion, rhinorrhea and sneezing. Negative for postnasal drip, sinus pressure and sinus pain. Respiratory:  Positive for cough, chest tightness, shortness of breath and wheezing. Negative for hemoptysis and sputum production. Cardiovascular:  Positive for dyspnea on exertion. Negative for chest pain (sharp), palpitations and leg swelling. Genitourinary:  Negative for difficulty urinating. Allergic/Immunologic: Positive for environmental allergies.       Physical exam   /68   Pulse 68   Temp 97.7 °F (36.5 °C)   Ht 5' 8\" (1.727 m)   Wt 183 lb 6.4 oz (83.2 kg)   SpO2 98%   BMI 27.89 kg/m²    Wt Readings from Last 3 Encounters:   10/11/22 183 lb 6.4 oz (83.2 kg) 06/21/22 180 lb (81.6 kg)   12/21/21 184 lb (83.5 kg)       Physical Exam  Constitutional:       General: He is not in acute distress. Comments: BMI 27.89   HENT:      Head: Normocephalic and atraumatic. Right Ear: External ear normal.      Left Ear: External ear normal.      Mouth/Throat:      Mouth: Mucous membranes are moist.      Pharynx: No oropharyngeal exudate or posterior oropharyngeal erythema. Eyes:      General:         Right eye: No discharge. Left eye: No discharge. Cardiovascular:      Rate and Rhythm: Normal rate and regular rhythm. Pulmonary:      Effort: Pulmonary effort is normal. No respiratory distress. Breath sounds: Wheezing (faint expiratory upper lobes) present. No rhonchi or rales. Chest:      Chest wall: No tenderness. Musculoskeletal:      Cervical back: Neck supple. Right lower leg: No edema. Left lower leg: No edema. Skin:     General: Skin is warm and dry. Neurological:      General: No focal deficit present. Mental Status: He is alert. Psychiatric:         Mood and Affect: Mood normal.         Behavior: Behavior normal.         Thought Content: Thought content normal.        Results   Lung Nodule Screening     [x] Qualifies    [] Does not qualify   [] Declined    [] Completed  41 PY history, quit in 2016   The USPSTF recommends annual screening for lung cancer with low-dose computed tomography (LDCT) in adults aged 48 to 80 years who have a 20 pack-year smoking history and currently smoke or have quit within the past 15 years. Screening should be discontinued once a person has not smoked for 15 years or develops a health problem that substantially limits life expectancy or the ability or willingness to have curative lung surgery. CT Chest 3/7/22  Narrative   PROCEDURE: CT CHEST W CONTRAST       CLINICAL INFORMATION: Malignant neoplasm of upper lobe of right lung ([facility] [address] COMPARISON: CT chest dated 3/8/2021.        TECHNIQUE: 5 mm axial images were obtained through the chest after the administration of 80 mL [id number]-[id number] injected in the right AC with sagittal and coronal reconstructions. All CT scans at this facility use dose modulation, iterative reconstruction, and/or weight-based dosing when appropriate to reduce radiation dose to as low as reasonably achievable. FINDINGS:   Redemonstration of surgical changes from prior right upper lobectomy. There is thickening of the pleura at the fissure with some calcification, stable compared to prior exam. There is minimal scarlike opacity extending to the pleura at the lateral aspect    of the right lung and minimal scarlike opacity at the anterior aspect of the right lung and its base, unchanged compared to prior exam. There is also stable minimal scarlike opacity at the right hilum near the previous pulmonary artery ligation. Minimal    nodular-like scarlike opacity at the pleura posteriorly at the left upper lung is unchanged compared to prior exam. There is a calcified nodule in the anterior superior aspect of the right lung and right hilar and subcarinal calcifications as evidence    for old granulomatous disease, stable compared to prior exam. The lungs otherwise appear clear. There are mild emphysematous changes predominantly in the upper lungs, unchanged compared to prior exam. No axillary, mediastinal or hilar lymphadenopathy is    identified. The heart is normal in appearance. There is stable coronary artery calcification. There are scattered calcified granulomas in the spleen. There are a few scattered calcified granulomas in the liver. There is a stable exophytic cyst at the interpolar region of the right kidney. Visualized upper abdominal solid organs are otherwise    unremarkable. There are stable mild degenerative changes of the thoracic spine. No suspicious osseous lesion is identified.                Impression    Redemonstration of postsurgical changes from prior right upper lobectomy without evidence of recurrent or metastatic disease. **This report has been created using voice recognition software. It may contain minor errors which are inherent in voice recognition technology. **       Final report electronically signed by Dr. Olamide Callahan MD on 3/7/2022 8:32 AM       Assessment      Diagnosis Orders   1. Stage 2 moderate COPD by GOLD classification ([address]        2. Pulmonary emphysema, unspecified emphysema type ([address]        3. Squamous cell carcinoma of right lung ([address]        4. Personal history of tobacco use  DE VISIT TO DISCUSS LUNG CA SCREEN W LDCT    CT Lung Screen (Annual)            Plan   1. Stage 2 moderate COPD by GOLD classification with Pulmonary emphysema, unspecified emphysema type   -Symptoms well controlled on Spiriva with stable spirometry testing. Continue Spiriva  -Continue albuterol 2 puffs every 6 hours as needed for shortness of breath or wheezing   -Advised flu vaccine  -Pneumonia vaccine up-to-date and received COVID-19 vaccination    2. Squamous cell carcinoma of right lung [facility])  -Patient reports that he completed his 5-year surveillance with Dr. Kami Zuñiga. He is to follow with Dr. Kami Zuñiga as needed now. Most recent CT chest completed March 2022 with no evidence of recurrent or metastatic disease. We will resume CT lung screenings    3. Personal history of tobacco use  - DE VISIT TO DISCUSS LUNG CA SCREEN W LDCT  - CT Lung Screen ordered for March 2023      Advised patient to call office with any changes, questions, or concerns regarding respiratory status or issues with prescribed medications    Return in about 5 months (around 3/11/2023) for COPD with LDCT prior.        Electronically signed by DEMI Meyers CNP on 10/11/2022 at 8:27 AM     (Please note that portions of this note may have been completed with a voice recognition program. Efforts were made to edit the dictation but occasionally words are mis-transcribed)   Low Dose CT (LDCT) Lung Screening criteria met   Age 49-80   Pack year smoking >20   Still smoking or less than 15 year since quit   No sign or symptoms of lung cancer   > 11 months since last LDCT     Risks and benefits of lung cancer screening with LDCT scans discussed:    Significance of positive screen - False-positive LDCT results often occur. 95% of all positive results do not lead to a diagnosis of cancer. Usually further imaging can resolve most false-positive results; however, some patients may require invasive procedures. Over diagnosis risk - 10% to 12% of screen-detected lung cancer cases are over diagnosed--that is, the cancer would not have been detected in the patient's lifetime without the screening. Need for follow up screens annually to continue lung cancer screening effectiveness     Risks associated with radiation from annual LDCT- Radiation exposure is about the same as for a mammogram, which is about 1/3 of the annual background radiation exposure from everyday life. Starting screening at age 48 is not likely to increase cancer risk from radiation exposure. Patients with comorbidities resulting in life expectancy of < 10 years, or that would preclude treatment of an abnormality identified on CT, should not be screened due to lack of benefit.     To obtain maximal benefit from this screening, smoking cessation and long-term abstinence from smoking is critical

## 2022-10-04 ENCOUNTER — HOSPITAL ENCOUNTER (OUTPATIENT)
Dept: PULMONOLOGY | Age: 67
End: 2022-10-04
Payer: MEDICARE

## 2022-10-04 ENCOUNTER — HOSPITAL ENCOUNTER (OUTPATIENT)
Dept: PULMONOLOGY | Age: 67
Discharge: HOME | End: 2022-10-04
Payer: MEDICARE

## 2022-10-04 ENCOUNTER — APPOINTMENT (OUTPATIENT)
Dept: PULMONOLOGY | Age: 67
End: 2022-10-04
Payer: MEDICARE

## 2022-10-04 DIAGNOSIS — J43.9 PULMONARY EMPHYSEMA, UNSPECIFIED EMPHYSEMA TYPE (HCC): ICD-10-CM

## 2022-10-04 DIAGNOSIS — J44.9 STAGE 2 MODERATE COPD BY GOLD CLASSIFICATION (HCC): ICD-10-CM

## 2022-10-04 PROCEDURE — 94060 EVALUATION OF WHEEZING: CPT

## 2022-10-11 ENCOUNTER — OFFICE VISIT (OUTPATIENT)
Dept: PULMONOLOGY | Age: 67
End: 2022-10-11
Payer: MEDICARE

## 2022-10-11 VITALS
HEIGHT: 68 IN | TEMPERATURE: 97.7 F | SYSTOLIC BLOOD PRESSURE: 120 MMHG | WEIGHT: 183.4 LBS | HEART RATE: 68 BPM | OXYGEN SATURATION: 98 % | BODY MASS INDEX: 27.8 KG/M2 | DIASTOLIC BLOOD PRESSURE: 68 MMHG

## 2022-10-11 DIAGNOSIS — J43.9 PULMONARY EMPHYSEMA, UNSPECIFIED EMPHYSEMA TYPE (HCC): ICD-10-CM

## 2022-10-11 DIAGNOSIS — Z87.891 PERSONAL HISTORY OF TOBACCO USE: ICD-10-CM

## 2022-10-11 DIAGNOSIS — C34.91 SQUAMOUS CELL CARCINOMA OF RIGHT LUNG (HCC): ICD-10-CM

## 2022-10-11 DIAGNOSIS — J44.9 STAGE 2 MODERATE COPD BY GOLD CLASSIFICATION (HCC): Primary | ICD-10-CM

## 2022-10-11 PROCEDURE — G0296 VISIT TO DETERM LDCT ELIG: HCPCS

## 2022-10-11 PROCEDURE — 99214 OFFICE O/P EST MOD 30 MIN: CPT

## 2022-10-11 PROCEDURE — 1123F ACP DISCUSS/DSCN MKR DOCD: CPT

## 2022-10-11 ASSESSMENT — ENCOUNTER SYMPTOMS
SHORTNESS OF BREATH: 1
SPUTUM PRODUCTION: 0
COUGH: 1
SINUS PAIN: 0
RHINORRHEA: 1
CHEST TIGHTNESS: 1
SINUS PRESSURE: 0
HEMOPTYSIS: 0
WHEEZING: 1

## 2022-10-11 ASSESSMENT — COPD QUESTIONNAIRES: COPD: 1

## 2022-10-11 NOTE — PROGRESS NOTES
Patient is experiencing SOB: Yes    Patient is experiencing wheezing: Yes    Patient states they have had a Weak, non-productive = 3 cough. Phlegm is none    Patient is coughing up blood: no    Patient has been experiencing chest pains: sharp    Patient is currently taking the following inhaler(s): Spiriva, Albuterol    Patient is using their rescue inhaler 1-2 times per week.

## 2022-10-11 NOTE — PATIENT INSTRUCTIONS
Continue Spiriva. Continue your albuterol inhaler. You may take 2 puffs every 6 hours as needed for shortness of breath or wheezing. I will see you back in March 2023 for your lung screening. What is lung cancer screening? Lung cancer screening is a way in which doctors check the lungs for early signs of cancer in people who have no symptoms of lung cancer. A low-dose CT scan uses much less radiation than a normal CT scan and shows a more detailed image of the lungs than a standard X-ray. The goal of lung cancer screening is to find cancer early, before it has a chance to grow, spread, or cause problems. One large study found that smokers who were screened with low-dose CT scans were less likely to die of lung cancer than those who were screened with standard X-ray. Below is a summary of the things you need to know regarding screening for lung cancer with low-dose computed tomography (LDCT). This is a screening program that involves routine annual screening with LDCT studies of the lung. The LDCTs are done using low-dose radiation that is not thought to increase your cancer risk. If you have other serious medical conditions (other cancers, congestive heart failure) that limit your life expectancy to less than 10 years, you should not undergo lung cancer screening with LDCT. The chance is 20%-60% that the LDCT result will show abnormalities. This would require additional testing which could include repeat imaging or even invasive procedures. Most (about 95%) of \"abnormal\" LDCT results are false in the sense that no lung cancer is ultimately found. Additionally, some (about 10%) of the cancers found would not affect your life expectancy, even if undetected and untreated. If you are still smoking, the single most important thing that you can do to reduce your risk of dying of lung cancer is to quit.     For this screening to be covered by Medicare and most other insurers, strict criteria must be met. If you do not meet these criteria, but still wish to undergo LDCT testing, you will be required to sign a waiver indicating your willingness to pay for the scan.

## 2022-11-29 LAB
ABSOLUTE BASO #: 0.07 K/UL (ref 0–0.2)
ABSOLUTE EOS #: 0.35 K/UL (ref 0–0.5)
ABSOLUTE LYMPH #: 1.79 K/UL (ref 1–4)
ABSOLUTE MONO #: 0.43 K/UL (ref 0.2–1)
ABSOLUTE NEUT #: 3.82 K/UL (ref 1.5–7.5)
ALBUMIN SERPL-MCNC: 3.9 G/DL (ref 3.5–5.2)
ALK PHOSPHATASE: 89 U/L (ref 40–123)
ALT SERPL-CCNC: 22 U/L (ref 5–50)
ANION GAP SERPL CALCULATED.3IONS-SCNC: 9 MEQ/L (ref 7–16)
AST SERPL-CCNC: 39 U/L (ref 9–50)
AVERAGE GLUCOSE: 111 MG/DL
BASOPHILS RELATIVE PERCENT: 1.1 %
BILIRUB SERPL-MCNC: 0.4 MG/DL
BUN BLDV-MCNC: 11 MG/DL (ref 8–23)
CALCIUM SERPL-MCNC: 9 MG/DL (ref 8.5–10.5)
CHLORIDE BLD-SCNC: 105 MEQ/L (ref 95–107)
CHOLESTEROL/HDL RATIO: 3.2 RATIO
CHOLESTEROL: 128 MG/DL
CO2: 28 MEQ/L (ref 19–31)
CREAT SERPL-MCNC: 1.16 MG/DL (ref 0.8–1.4)
EGFR IF NONAFRICAN AMERICAN: 70 ML/MIN/1.73
EOSINOPHILS RELATIVE PERCENT: 5.4 %
GLUCOSE: 103 MG/DL (ref 70–99)
HBA1C MFR BLD: 5.5 % (ref 4.2–5.6)
HCT VFR BLD CALC: 37.9 % (ref 40–51)
HDLC SERPL-MCNC: 40 MG/DL
HEMOGLOBIN: 12.7 G/DL (ref 13.5–17)
LDL CHOLESTEROL CALCULATED: 52 MG/DL
LDL/HDL RATIO: 1.3 RATIO
LYMPHOCYTE %: 27.6 %
MCH RBC QN AUTO: 31.8 PG (ref 25–33)
MCHC RBC AUTO-ENTMCNC: 33.5 G/DL (ref 31–36)
MCV RBC AUTO: 94.8 FL (ref 80–99)
MONOCYTES # BLD: 6.6 %
NEUTROPHILS RELATIVE PERCENT: 58.8 %
PDW BLD-RTO: 12.7 % (ref 11.5–15)
PLATELETS: 226 K/UL (ref 130–400)
PMV BLD AUTO: 8.9 FL (ref 9.3–13)
POTASSIUM SERPL-SCNC: 4.4 MEQ/L (ref 3.5–5.4)
PSA, ULTRASENSITIVE: 1.09 NG/ML
RBC: 4 M/UL (ref 4.5–6.1)
SODIUM BLD-SCNC: 142 MEQ/L (ref 133–146)
TOTAL PROTEIN: 6.5 G/DL (ref 6.1–8.3)
TRIGL SERPL-MCNC: 178 MG/DL
TSH SERPL DL<=0.05 MIU/L-ACNC: 2.33 UIU/ML (ref 0.4–4.1)
VLDLC SERPL CALC-MCNC: 36 MG/DL
WBC: 6.5 K/UL (ref 3.5–11)

## 2022-12-13 ENCOUNTER — OFFICE VISIT (OUTPATIENT)
Dept: FAMILY MEDICINE CLINIC | Age: 67
End: 2022-12-13
Payer: MEDICARE

## 2022-12-13 VITALS
WEIGHT: 183.7 LBS | DIASTOLIC BLOOD PRESSURE: 60 MMHG | BODY MASS INDEX: 27.93 KG/M2 | SYSTOLIC BLOOD PRESSURE: 118 MMHG | OXYGEN SATURATION: 100 % | HEART RATE: 53 BPM | RESPIRATION RATE: 15 BRPM

## 2022-12-13 DIAGNOSIS — N18.9 CHRONIC KIDNEY DISEASE, UNSPECIFIED CKD STAGE: ICD-10-CM

## 2022-12-13 DIAGNOSIS — I10 ESSENTIAL HYPERTENSION: Primary | ICD-10-CM

## 2022-12-13 DIAGNOSIS — E78.6 LOW HDL (UNDER 40): ICD-10-CM

## 2022-12-13 DIAGNOSIS — J44.9 CHRONIC OBSTRUCTIVE PULMONARY DISEASE, UNSPECIFIED COPD TYPE (HCC): ICD-10-CM

## 2022-12-13 DIAGNOSIS — R73.01 IFG (IMPAIRED FASTING GLUCOSE): ICD-10-CM

## 2022-12-13 DIAGNOSIS — E78.49 OTHER HYPERLIPIDEMIA: ICD-10-CM

## 2022-12-13 DIAGNOSIS — M10.00 IDIOPATHIC GOUT, UNSPECIFIED CHRONICITY, UNSPECIFIED SITE: ICD-10-CM

## 2022-12-13 DIAGNOSIS — F41.8 SITUATIONAL ANXIETY: ICD-10-CM

## 2022-12-13 PROCEDURE — 3074F SYST BP LT 130 MM HG: CPT | Performed by: FAMILY MEDICINE

## 2022-12-13 PROCEDURE — 1123F ACP DISCUSS/DSCN MKR DOCD: CPT | Performed by: FAMILY MEDICINE

## 2022-12-13 PROCEDURE — 99214 OFFICE O/P EST MOD 30 MIN: CPT | Performed by: FAMILY MEDICINE

## 2022-12-13 PROCEDURE — 3078F DIAST BP <80 MM HG: CPT | Performed by: FAMILY MEDICINE

## 2022-12-13 SDOH — ECONOMIC STABILITY: FOOD INSECURITY: WITHIN THE PAST 12 MONTHS, THE FOOD YOU BOUGHT JUST DIDN'T LAST AND YOU DIDN'T HAVE MONEY TO GET MORE.: NEVER TRUE

## 2022-12-13 SDOH — ECONOMIC STABILITY: FOOD INSECURITY: WITHIN THE PAST 12 MONTHS, YOU WORRIED THAT YOUR FOOD WOULD RUN OUT BEFORE YOU GOT MONEY TO BUY MORE.: NEVER TRUE

## 2022-12-13 ASSESSMENT — SOCIAL DETERMINANTS OF HEALTH (SDOH): HOW HARD IS IT FOR YOU TO PAY FOR THE VERY BASICS LIKE FOOD, HOUSING, MEDICAL CARE, AND HEATING?: NOT HARD AT ALL

## 2022-12-13 ASSESSMENT — ENCOUNTER SYMPTOMS
RESPIRATORY NEGATIVE: 1
GASTROINTESTINAL NEGATIVE: 1

## 2022-12-13 NOTE — PROGRESS NOTES
Aretha Gutierrez (:  1955) is a 74 y.o. male,Established patient, here for evaluation of the following chief complaint(s):  6 Month Follow-Up, Hypertension, and Discuss Labs        Subjective   SUBJECTIVE/OBJECTIVE:  HPI:    Chief Complaint   Patient presents with    6 Month Follow-Up    Hypertension    Discuss Labs     6 month eval.    Doing well. No concerns. BP Readings from Last 3 Encounters:   22 118/60   10/11/22 120/68   22 122/68     Wt Readings from Last 3 Encounters:   22 183 lb 11.2 oz (83.3 kg)   10/11/22 183 lb 6.4 oz (83.2 kg)   22 180 lb (81.6 kg)     Patient Active Problem List   Diagnosis    HTN (hypertension)    IFG (impaired fasting glucose)    Stage 2 moderate COPD by GOLD classification (HCC)    Low HDL (under 40)    Situational anxiety    Squamous cell carcinoma of right lung (HCC)    Open wound of back, complicated     Past Surgical History:   Procedure Laterality Date    COLONOSCOPY      FRACTURE SURGERY      collar bone    LOBECTOMY Right 3/24/16    upper    LUNG BIOPSY Right 3/3/16    Dr Jame Carr History     Tobacco Use    Smoking status: Former     Packs/day: 0.00     Years: 41.00     Pack years: 0.00     Types: Cigarettes     Start date: 1973     Quit date: 3/24/2016     Years since quittin.7    Smokeless tobacco: Never   Vaping Use    Vaping Use: Never used   Substance Use Topics    Alcohol use: Yes     Comment: occasionally on weekends while camping    Drug use: No         Review of Systems   Constitutional: Negative. HENT: Negative. Respiratory: Negative. Cardiovascular: Negative. Gastrointestinal: Negative. Musculoskeletal: Negative. All other systems reviewed and are negative. Objective   Physical Exam  Vitals and nursing note reviewed. Constitutional:       General: He is not in acute distress. Appearance: Normal appearance. He is well-developed.    HENT:      Head: Normocephalic and atraumatic. Right Ear: Tympanic membrane normal.      Left Ear: Tympanic membrane normal.   Eyes:      Conjunctiva/sclera: Conjunctivae normal.   Cardiovascular:      Rate and Rhythm: Normal rate and regular rhythm. Heart sounds: Normal heart sounds. No murmur heard. Pulmonary:      Effort: Pulmonary effort is normal.      Breath sounds: Normal breath sounds. No wheezing, rhonchi or rales. Abdominal:      General: There is no distension. Musculoskeletal:      Cervical back: Neck supple. Skin:     General: Skin is warm and dry. Findings: No rash (on exposed surfaces). Neurological:      General: No focal deficit present. Mental Status: He is alert. Psychiatric:         Attention and Perception: Attention normal.         Mood and Affect: Mood normal.         Speech: Speech normal.         Behavior: Behavior normal. Behavior is cooperative. Thought Content: Thought content normal.         Judgment: Judgment normal.             ASSESSMENT/PLAN:  1. Essential hypertension  2. IFG (impaired fasting glucose)  3. Other hyperlipidemia  4. Idiopathic gout, unspecified chronicity, unspecified site  5. Chronic obstructive pulmonary disease, unspecified COPD type ([facility] [address]  6. Chronic kidney disease, unspecified CKD stage  7. Low HDL (under 40)  8. Situational anxiety    -  Chronic medical problems stable  -  Labs reviewed, look fine  -  Continue current medications  -  Follow up with specialists as scheduled    Return in about 6 months (around 6/13/2023) for HTN. An electronic signature was used to authenticate this note.     --Zuleyka Guidry DO

## 2023-01-01 ENCOUNTER — APPOINTMENT (OUTPATIENT)
Dept: CT IMAGING | Age: 68
End: 2023-01-01
Payer: COMMERCIAL

## 2023-01-01 ENCOUNTER — HOSPITAL ENCOUNTER (OUTPATIENT)
Age: 68
Setting detail: OBSERVATION
Discharge: HOME | End: 2023-01-02
Attending: EMERGENCY MEDICINE | Admitting: INTERNAL MEDICINE
Payer: COMMERCIAL

## 2023-01-01 ENCOUNTER — APPOINTMENT (OUTPATIENT)
Dept: GENERAL RADIOLOGY | Age: 68
End: 2023-01-01
Payer: COMMERCIAL

## 2023-01-01 DIAGNOSIS — R56.9 SEIZURE (HCC): Primary | ICD-10-CM

## 2023-01-01 LAB
ALBUMIN SERPL-MCNC: 3.7 G/DL (ref 3.5–5.1)
ALP BLD-CCNC: 94 U/L (ref 38–126)
ALT SERPL-CCNC: 40 U/L (ref 11–66)
AMPHETAMINE+METHAMPHETAMINE URINE SCREEN: NEGATIVE
ANION GAP SERPL CALCULATED.3IONS-SCNC: 15 MEQ/L (ref 8–16)
AST SERPL-CCNC: 84 U/L (ref 5–40)
BACTERIA: ABNORMAL
BARBITURATE QUANTITATIVE URINE: NEGATIVE
BASOPHILS # BLD: 1.4 %
BASOPHILS ABSOLUTE: 0.1 THOU/MM3 (ref 0–0.1)
BENZODIAZEPINE QUANTITATIVE URINE: NEGATIVE
BILIRUB SERPL-MCNC: 0.4 MG/DL (ref 0.3–1.2)
BILIRUBIN URINE: NEGATIVE
BLOOD, URINE: NEGATIVE
BUN BLDV-MCNC: 15 MG/DL (ref 7–22)
CALCIUM SERPL-MCNC: 8.6 MG/DL (ref 8.5–10.5)
CANNABINOID QUANTITATIVE URINE: POSITIVE
CASTS: ABNORMAL /LPF
CASTS: ABNORMAL /LPF
CHARACTER, URINE: CLEAR
CHLORIDE BLD-SCNC: 98 MEQ/L (ref 98–111)
CO2: 21 MEQ/L (ref 23–33)
COCAINE METABOLITE QUANTITATIVE URINE: NEGATIVE
COLOR: YELLOW
CREAT SERPL-MCNC: 1.3 MG/DL (ref 0.4–1.2)
CRYSTALS: ABNORMAL
EOSINOPHIL # BLD: 1.7 %
EOSINOPHILS ABSOLUTE: 0.1 THOU/MM3 (ref 0–0.4)
EPITHELIAL CELLS, UA: ABNORMAL /HPF
ERYTHROCYTE [DISTWIDTH] IN BLOOD BY AUTOMATED COUNT: 13.2 % (ref 11.5–14.5)
ERYTHROCYTE [DISTWIDTH] IN BLOOD BY AUTOMATED COUNT: 45.5 FL (ref 35–45)
ETHYL ALCOHOL, SERUM: 0.03 %
FENTANYL: NEGATIVE
GFR SERPL CREATININE-BSD FRML MDRD: > 60 ML/MIN/1.73M2
GLUCOSE BLD-MCNC: 129 MG/DL (ref 70–108)
GLUCOSE, URINE: NEGATIVE MG/DL
HCT VFR BLD CALC: 39.9 % (ref 42–52)
HEMOGLOBIN: 13.6 GM/DL (ref 14–18)
IMMATURE GRANS (ABS): 0.02 THOU/MM3 (ref 0–0.07)
IMMATURE GRANULOCYTES: 0.4 %
KETONES, URINE: ABNORMAL
LACTIC ACID: 3 MMOL/L (ref 0.5–2)
LEUKOCYTE EST, POC: NEGATIVE
LYMPHOCYTES # BLD: 20.2 %
LYMPHOCYTES ABSOLUTE: 1.1 THOU/MM3 (ref 1–4.8)
MAGNESIUM: 1.6 MG/DL (ref 1.6–2.4)
MCH RBC QN AUTO: 32.4 PG (ref 26–33)
MCHC RBC AUTO-ENTMCNC: 34.1 GM/DL (ref 32.2–35.5)
MCV RBC AUTO: 95 FL (ref 80–94)
MISCELLANEOUS LAB TEST RESULT: ABNORMAL
MONOCYTES # BLD: 6.8 %
MONOCYTES ABSOLUTE: 0.4 THOU/MM3 (ref 0.4–1.3)
NITRITE, URINE: NEGATIVE
NUCLEATED RED BLOOD CELLS: 0 /100 WBC
OPIATES, URINE: NEGATIVE
OSMOLALITY CALCULATION: 270.8 MOSMOL/KG (ref 275–300)
OXYCODONE: NEGATIVE
PH UA: 5.5 (ref 5–9)
PHENCYCLIDINE QUANTITATIVE URINE: NEGATIVE
PLATELET # BLD: 213 THOU/MM3 (ref 130–400)
PMV BLD AUTO: 8.5 FL (ref 9.4–12.4)
POTASSIUM REFLEX MAGNESIUM: 4.6 MEQ/L (ref 3.5–5.2)
PROTEIN UA: ABNORMAL MG/DL
RBC # BLD: 4.2 MILL/MM3 (ref 4.7–6.1)
RBC URINE: ABNORMAL /HPF
RENAL EPITHELIAL, UA: ABNORMAL
SEG NEUTROPHILS: 69.5 %
SEGMENTED NEUTROPHILS ABSOLUTE COUNT: 3.6 THOU/MM3 (ref 1.8–7.7)
SODIUM BLD-SCNC: 134 MEQ/L (ref 135–145)
SPECIFIC GRAVITY UA: 1.02 (ref 1–1.03)
TOTAL CK: 85 U/L (ref 55–170)
TOTAL PROTEIN: 6.3 G/DL (ref 6.1–8)
TROPONIN T: < 0.01 NG/ML
TSH SERPL DL<=0.05 MIU/L-ACNC: 5.56 UIU/ML (ref 0.4–4.2)
UROBILINOGEN, URINE: 0.2 EU/DL (ref 0–1)
WBC # BLD: 5.2 THOU/MM3 (ref 4.8–10.8)
WBC UA: ABNORMAL /HPF
YEAST: ABNORMAL

## 2023-01-01 PROCEDURE — 82077 ASSAY SPEC XCP UR&BREATH IA: CPT

## 2023-01-01 PROCEDURE — 83605 ASSAY OF LACTIC ACID: CPT

## 2023-01-01 PROCEDURE — 84443 ASSAY THYROID STIM HORMONE: CPT

## 2023-01-01 PROCEDURE — 83735 ASSAY OF MAGNESIUM: CPT

## 2023-01-01 PROCEDURE — 99285 EMERGENCY DEPT VISIT HI MDM: CPT

## 2023-01-01 PROCEDURE — 85025 COMPLETE CBC W/AUTO DIFF WBC: CPT

## 2023-01-01 PROCEDURE — 81001 URINALYSIS AUTO W/SCOPE: CPT

## 2023-01-01 PROCEDURE — 6360000002 HC RX W HCPCS: Performed by: STUDENT IN AN ORGANIZED HEALTH CARE EDUCATION/TRAINING PROGRAM

## 2023-01-01 PROCEDURE — 70450 CT HEAD/BRAIN W/O DYE: CPT

## 2023-01-01 PROCEDURE — G0378 HOSPITAL OBSERVATION PER HR: HCPCS

## 2023-01-01 PROCEDURE — 96374 THER/PROPH/DIAG INJ IV PUSH: CPT

## 2023-01-01 PROCEDURE — 84146 ASSAY OF PROLACTIN: CPT

## 2023-01-01 PROCEDURE — 84484 ASSAY OF TROPONIN QUANT: CPT

## 2023-01-01 PROCEDURE — 96375 TX/PRO/DX INJ NEW DRUG ADDON: CPT

## 2023-01-01 PROCEDURE — 80307 DRUG TEST PRSMV CHEM ANLYZR: CPT

## 2023-01-01 PROCEDURE — 93005 ELECTROCARDIOGRAM TRACING: CPT | Performed by: EMERGENCY MEDICINE

## 2023-01-01 PROCEDURE — 96365 THER/PROPH/DIAG IV INF INIT: CPT

## 2023-01-01 PROCEDURE — 2580000003 HC RX 258: Performed by: STUDENT IN AN ORGANIZED HEALTH CARE EDUCATION/TRAINING PROGRAM

## 2023-01-01 PROCEDURE — 80053 COMPREHEN METABOLIC PANEL: CPT

## 2023-01-01 PROCEDURE — 36415 COLL VENOUS BLD VENIPUNCTURE: CPT

## 2023-01-01 PROCEDURE — 82550 ASSAY OF CK (CPK): CPT

## 2023-01-01 PROCEDURE — 71046 X-RAY EXAM CHEST 2 VIEWS: CPT

## 2023-01-01 RX ORDER — ACETAMINOPHEN 650 MG/1
650 SUPPOSITORY RECTAL EVERY 6 HOURS PRN
Status: DISCONTINUED | OUTPATIENT
Start: 2023-01-01 | End: 2023-01-02 | Stop reason: HOSPADM

## 2023-01-01 RX ORDER — SODIUM CHLORIDE 0.9 % (FLUSH) 0.9 %
5-40 SYRINGE (ML) INJECTION EVERY 12 HOURS SCHEDULED
Status: DISCONTINUED | OUTPATIENT
Start: 2023-01-01 | End: 2023-01-02 | Stop reason: HOSPADM

## 2023-01-01 RX ORDER — ONDANSETRON 2 MG/ML
4 INJECTION INTRAMUSCULAR; INTRAVENOUS EVERY 6 HOURS PRN
Status: DISCONTINUED | OUTPATIENT
Start: 2023-01-01 | End: 2023-01-02 | Stop reason: HOSPADM

## 2023-01-01 RX ORDER — ATORVASTATIN CALCIUM 20 MG/1
20 TABLET, FILM COATED ORAL NIGHTLY
Status: DISCONTINUED | OUTPATIENT
Start: 2023-01-02 | End: 2023-01-02 | Stop reason: HOSPADM

## 2023-01-01 RX ORDER — PANTOPRAZOLE SODIUM 40 MG/1
40 TABLET, DELAYED RELEASE ORAL
Status: DISCONTINUED | OUTPATIENT
Start: 2023-01-02 | End: 2023-01-02 | Stop reason: SDUPTHER

## 2023-01-01 RX ORDER — POLYETHYLENE GLYCOL 3350 17 G/17G
17 POWDER, FOR SOLUTION ORAL DAILY PRN
Status: DISCONTINUED | OUTPATIENT
Start: 2023-01-01 | End: 2023-01-02 | Stop reason: HOSPADM

## 2023-01-01 RX ORDER — ASPIRIN 81 MG/1
81 TABLET ORAL DAILY
Status: DISCONTINUED | OUTPATIENT
Start: 2023-01-02 | End: 2023-01-02 | Stop reason: HOSPADM

## 2023-01-01 RX ORDER — ALBUTEROL SULFATE 90 UG/1
2 AEROSOL, METERED RESPIRATORY (INHALATION) EVERY 6 HOURS PRN
Status: DISCONTINUED | OUTPATIENT
Start: 2023-01-01 | End: 2023-01-02 | Stop reason: HOSPADM

## 2023-01-01 RX ORDER — ACETAMINOPHEN 325 MG/1
650 TABLET ORAL EVERY 6 HOURS PRN
Status: DISCONTINUED | OUTPATIENT
Start: 2023-01-01 | End: 2023-01-02 | Stop reason: HOSPADM

## 2023-01-01 RX ORDER — LISINOPRIL 10 MG/1
10 TABLET ORAL DAILY
Status: DISCONTINUED | OUTPATIENT
Start: 2023-01-02 | End: 2023-01-02 | Stop reason: HOSPADM

## 2023-01-01 RX ORDER — SODIUM CHLORIDE 0.9 % (FLUSH) 0.9 %
5-40 SYRINGE (ML) INJECTION PRN
Status: DISCONTINUED | OUTPATIENT
Start: 2023-01-01 | End: 2023-01-02 | Stop reason: HOSPADM

## 2023-01-01 RX ORDER — 0.9 % SODIUM CHLORIDE 0.9 %
500 INTRAVENOUS SOLUTION INTRAVENOUS ONCE
Status: COMPLETED | OUTPATIENT
Start: 2023-01-01 | End: 2023-01-02

## 2023-01-01 RX ORDER — ENOXAPARIN SODIUM 100 MG/ML
40 INJECTION SUBCUTANEOUS DAILY
Status: DISCONTINUED | OUTPATIENT
Start: 2023-01-02 | End: 2023-01-02 | Stop reason: HOSPADM

## 2023-01-01 RX ORDER — LORAZEPAM 2 MG/ML
2 INJECTION INTRAMUSCULAR ONCE
Status: COMPLETED | OUTPATIENT
Start: 2023-01-01 | End: 2023-01-01

## 2023-01-01 RX ORDER — ALLOPURINOL 100 MG/1
100 TABLET ORAL DAILY
Status: DISCONTINUED | OUTPATIENT
Start: 2023-01-02 | End: 2023-01-02 | Stop reason: HOSPADM

## 2023-01-01 RX ORDER — SODIUM CHLORIDE 9 MG/ML
INJECTION, SOLUTION INTRAVENOUS PRN
Status: DISCONTINUED | OUTPATIENT
Start: 2023-01-01 | End: 2023-01-02 | Stop reason: HOSPADM

## 2023-01-01 RX ORDER — SODIUM CHLORIDE 9 MG/ML
INJECTION, SOLUTION INTRAVENOUS CONTINUOUS
Status: ACTIVE | OUTPATIENT
Start: 2023-01-02 | End: 2023-01-02

## 2023-01-01 RX ORDER — ONDANSETRON 4 MG/1
4 TABLET, ORALLY DISINTEGRATING ORAL EVERY 8 HOURS PRN
Status: DISCONTINUED | OUTPATIENT
Start: 2023-01-01 | End: 2023-01-02 | Stop reason: HOSPADM

## 2023-01-01 RX ADMIN — LORAZEPAM 2 MG: 2 INJECTION INTRAMUSCULAR; INTRAVENOUS at 19:22

## 2023-01-01 RX ADMIN — SODIUM CHLORIDE 500 ML: 9 INJECTION, SOLUTION INTRAVENOUS at 19:27

## 2023-01-01 RX ADMIN — LEVETIRACETAM 1500 MG: 100 INJECTION, SOLUTION INTRAVENOUS at 23:33

## 2023-01-01 NOTE — LETTER
[facility]  [address] [facility] [address]  Phone: [phone]             January 2, 2023    Patient: Hiram Peng   YOB: 1955   Date of Visit: 1/1/2023       To Whom It May Concern:    Renee Shafer was seen and treated in our facility  beginning 1/1/2023 until 1/02/2023. Nixon Mathewpatricio was here with spouse. Please excuse from work.        Sincerely,       Gala Garrison MD         Signature:__________________________________

## 2023-01-01 NOTE — ED TRIAGE NOTES
PT comes to ED with c/o possible seizure like activity. The seizure was witnessed and the wife states that his seizure like activity lasted 5-7 mins. Pt states that his arms and legs are slightly shaking at this time. Pt was found to be post ictal by EMS. Pt did not bite tongue. Pt is A&Ox4 at this time. EKG is completed.

## 2023-01-01 NOTE — ED PROVIDER NOTES
[address] [facility] [address] [address] EMERGENCY DEPT      EMERGENCY MEDICINE     Pt Name: Ricki Streeter  MRN: [id number]  [place] 1955  Date of evaluation: 1/1/2023  Provider: Peggy Dan MD    CHIEF COMPLAINT       Chief Complaint   Patient presents with    Seizures     HISTORY OF PRESENT ILLNESS   Ricki Streeter is a pleasant 74 y.o. male who presents to the emergency department from from home, brought in by EMS for evaluation of seizure-like activity. Patient is brought by EMS after 2 episodes of seizure-like activity described has eye rolling back, drooling, hyper extremity intensity, urinating; before episodes patient states was feeling unwell and with wobbling sensation on arms; he does not remember anything of episodes; both of them last at least 5 minutes with 5 minutes of time between them. At EMS arrival patient was on postictal.  Patient denies head trauma, no headache, no fever, no other symptoms. Lawernce Roughen     PASTMEDICAL HISTORY     Past Medical History:   Diagnosis Date    Cancer ([facility] [address]     LUNG    COPD (chronic obstructive pulmonary disease) ([facility])     GERD (gastroesophageal reflux disease)     Hypertension     Lung mass     RIGHT UPPER LOBE       Patient Active Problem List   Diagnosis Code    HTN (hypertension) I10    IFG (impaired fasting glucose) R73.01    Stage 2 moderate COPD by GOLD classification ([facility]) J44.9    Low HDL (under 40) E78.6    Situational anxiety F41.8    Squamous cell carcinoma of right lung ([facility]) C34.91    Open wound of back, complicated D43.597H     SURGICAL HISTORY       Past Surgical History:   Procedure Laterality Date    COLONOSCOPY      FRACTURE SURGERY      collar bone    LOBECTOMY Right 3/24/16    upper    LUNG BIOPSY Right 3/3/16    Dr Amy Littlejohn       Previous Medications    ALBUTEROL SULFATE  (90 BASE) MCG/ACT INHALER    Inhale 2 puffs into the lungs every 6 hours as needed for Wheezing or Shortness of Breath    ALLOPURINOL (ZYLOPRIM) 100 MG TABLET    TAKE 1 TABLET BY MOUTH ONCE DAILY    ASPIRIN 81 MG TABLET    Take 81 mg by mouth daily. ATORVASTATIN (LIPITOR) 20 MG TABLET    TAKE 1 TABLET BY MOUTH AT BEDTIME    CLOBETASOL (TEMOVATE) 0.05 % OINTMENT    Apply topically 2 times daily. LISINOPRIL (PRINIVIL;ZESTRIL) 10 MG TABLET    Take 1 tablet by mouth once daily    OMEPRAZOLE (PRILOSEC OTC) 20 MG TABLET    Take 20 mg by mouth daily. SPIRIVA RESPIMAT 2.5 MCG/ACT AERS INHALER    INHALE 2 PUFFS BY MOUTH ONCE DAILY       ALLERGIES     is allergic to adhesive tape. FAMILY HISTORY     He indicated that his mother is . He indicated that his father is . He indicated that the status of his brother is unknown. SOCIAL HISTORY       Social History     Tobacco Use    Smoking status: Former     Packs/day: 0.00     Years: 41.00     Pack years: 0.00     Types: Cigarettes     Start date: 1973     Quit date: 3/24/2016     Years since quittin.7    Smokeless tobacco: Never   Vaping Use    Vaping Use: Never used   Substance Use Topics    Alcohol use: Yes     Comment: occasionally on weekends while camping    Drug use: No       PHYSICAL EXAM       ED Triage Vitals   BP Temp Temp Source Heart Rate Resp SpO2 Height Weight   23 1813 23 1813 23 1813 23 1813 23 1813 01/01/23 1813 -- 23 181   129/70 98 °F (36.7 °C) Oral 91 18 100 %  180 lb (81.6 kg)       Additional Vital Signs:  Vitals:    23   BP: 129/70   Pulse: 91   Resp: 18   Temp: 98 °F (36.7 °C)   SpO2: 100%     Physical Exam  Constitutional:       General: He is not in acute distress. Appearance: He is not ill-appearing, toxic-appearing or diaphoretic. HENT:      Head: Normocephalic and atraumatic. Right Ear: Tympanic membrane, ear canal and external ear normal.      Left Ear: Tympanic membrane, ear canal and external ear normal.      Nose: Nose normal. No congestion or rhinorrhea.       Mouth/Throat:      Mouth: Mucous membranes are moist.      Pharynx: No oropharyngeal exudate or posterior oropharyngeal erythema. Eyes:      Extraocular Movements: Extraocular movements intact. Pupils: Pupils are equal, round, and reactive to light. Comments: Horizontal nystagmus   Cardiovascular:      Rate and Rhythm: Normal rate and regular rhythm. Pulses: Normal pulses. Heart sounds: Normal heart sounds. No murmur heard. No friction rub. No gallop. Pulmonary:      Effort: Pulmonary effort is normal. No respiratory distress. Breath sounds: Normal breath sounds. No stridor. No wheezing, rhonchi or rales. Chest:      Chest wall: No tenderness. Abdominal:      General: Abdomen is flat. There is no distension. Palpations: Abdomen is soft. Tenderness: There is no abdominal tenderness. There is no guarding or rebound. Musculoskeletal:         General: No swelling, tenderness, deformity or signs of injury. Normal range of motion. Cervical back: Normal range of motion and neck supple. No rigidity or tenderness. Comments: Bilateral anterior thigh muscle fasciculations are observed   Skin:     General: Skin is warm. Capillary Refill: Capillary refill takes less than 2 seconds. Coloration: Skin is not pale. Findings: No erythema, lesion or rash. Neurological:      General: No focal deficit present. Mental Status: He is alert and oriented to person, place, and time. Sensory: No sensory deficit. Motor: No weakness. Coordination: Coordination normal.   Psychiatric:         Mood and Affect: Mood normal.         Behavior: Behavior normal.       FORMAL DIAGNOSTIC RESULTS     RADIOLOGY: Interpretation per the Radiologist below, if available at the time of this note (none if blank):    CT Head W/O Contrast   Final Result   Unremarkable noncontrast CT brain. No acute intracranial process. .          **This report has been created using voice recognition software.   It may contain minor errors which are inherent in voice recognition technology. **      Final report electronically signed by Dr. Laurann Severe on 1/1/2023 7:54 PM      XR CHEST (2 VW)   Final Result   1. Lungs hyperinflated and fibroemphysematous in appearance. Prior surgery in left clavicle. Cardiac silhouette small in size. Vascular clips right hilar region from prior surgery. 2. Moderate fibrotic stranding along right hemidiaphragm and in right apex. Flattened hemidiaphragms, consistent with COPD. 3. No acute findings. No infiltrates or effusions are seen. **This report has been created using voice recognition software. It may contain minor errors which are inherent in voice recognition technology. **      Final report electronically signed by Dr. Laurann Severe on 1/1/2023 7:47 PM          LABS: (none if blank)  Labs Reviewed   CBC WITH AUTO DIFFERENTIAL - Abnormal; Notable for the following components:       Result Value    RBC 4.20 (*)     Hemoglobin 13.6 (*)     Hematocrit 39.9 (*)     MCV 95.0 (*)     RDW-SD 45.5 (*)     MPV 8.5 (*)     All other components within normal limits   LACTIC ACID - Abnormal; Notable for the following components:    Lactic Acid 3.0 (*)     All other components within normal limits   TSH - Abnormal; Notable for the following components:    TSH 5.560 (*)     All other components within normal limits   COMPREHENSIVE METABOLIC PANEL W/ REFLEX TO MG FOR LOW K - Abnormal; Notable for the following components:    Glucose 129 (*)     Creatinine 1.3 (*)     Sodium 134 (*)     CO2 21 (*)     AST 84 (*)     All other components within normal limits   OSMOLALITY - Abnormal; Notable for the following components:    Osmolality Calc 270.8 (*)     All other components within normal limits   ETHANOL   TROPONIN   CK   MAGNESIUM   GLOMERULAR FILTRATION RATE, ESTIMATED   ANION GAP   URINALYSIS   URINE DRUG SCREEN       (Any cultures that may have been sent were not resulted at the time of this patient visit)    [address] / ED COURSE:     1) Number and Complexity of Problems            Problem List This Visit:         Chief Complaint   Patient presents with    Seizures            Differential Diagnosis includes (but not limited to):  Seizure-like activity. Secondary epilepsy. Brain metastasis. Diagnoses Considered but I have low suspicion of:   Meningitis. Epidural/subdural hematoma  Metabolic derangement. Pertinent Comorbid Conditions:    Medical history of hypertension, COPD, lung cancer that required lobectomy 2016, follows Dr. Sheryle Kelly, clear up of cancer in 2016    2)  Data Reviewed (none if left blank)          My Independent interpretations:     EKG:          Imaging: CT head scan no acute findings, chest x-ray no acute findings    Labs:      TSH elevated, CMP with elevation in creatinine, mild hyponatremia, normal LFTs, hyperlipidemia, troponin is negative, CK is normal, CBC no leukocytosis, no neutrophilia. Decision Rules/Clinical Scores utilized:              External Documentation Reviewed:         Previous patient encounter documents & history available on EMR was reviewed              See Formal Diagnostic Results above for the lab and radiology tests and orders. 3)  Treatment and Disposition         ED Reassessment: Patient informs persistent fasciculations in both thighs which are observed at during initial physical examination reason why we will order Ativan 2 mg considering partial seizures.   Blood work shows hyperlipidemia, elevation in creatinine, and after initial treatment patient persists with muscle fasciculation reason why Keppra will be ordered and patient will be admitted for observation         Case discussed with consulting clinician:           Shared Decision-Making was performed and disposition discussed with the        Patient/Family and questions answered ; patient and family expressed not feeling safe going home because of episode of seizures. Social determinants of health impacting treatment or disposition:           Code Status: Full code      Summary of Patient Presentation:      MDM  Number of Diagnoses or Management Options  Diagnosis management comments: 72-year-old patient with previous medical history of COPD hypertension and lung cancer presenting after 2 episodes of generalized tonic-clonic seizures; physical examination is remarkable persistent fasciculations in tight muscles with no neuro focalization. Blood work shows hypercalcemia and elevated creatinine with no other significant abnormality, CT scan no masses or metastasis; at this point and considering persistence of fasciculations after initial benzodiazepine, we will administer second line anticompulsive hands and we will admit patient for neuro observation overnight.    /   Vitals Reviewed:    Vitals:    01/01/23 1813 01/01/23 1814   BP: 129/70    Pulse: 91    Resp: 18    Temp: 98 °F (36.7 °C)    TempSrc: Oral    SpO2: 100%    Weight:  180 lb (81.6 kg)       The patient was seen and examined. Appropriate diagnostic testing was performed and results reviewed with the patient. ED Medications administered this visit:  (None if blank)  Medications   levETIRAcetam (KEPPRA) 1,500 mg in sodium chloride 0.9 % 100 mL IVPB (has no administration in time range)   0.9 % sodium chloride bolus (500 mLs IntraVENous New Bag 1/1/23 1927)   LORazepam (ATIVAN) injection 2 mg (2 mg IntraVENous Given 1/1/23 1922)         PROCEDURES: (None if blank)  Procedures:     CRITICAL CARE: (None if blank)      DISCHARGE PRESCRIPTIONS: (None if blank)  New Prescriptions    No medications on file       FINAL IMPRESSION      1. Seizure ([facility] [address]          DISPOSITION/PLAN   DISPOSITION        OUTPATIENT FOLLOW UP THE PATIENT:  No follow-up provider specified.     MD Lucia Florian MD  Resident  01/01/23 2194

## 2023-01-02 ENCOUNTER — APPOINTMENT (OUTPATIENT)
Dept: MRI IMAGING | Age: 68
End: 2023-01-02
Payer: COMMERCIAL

## 2023-01-02 VITALS
SYSTOLIC BLOOD PRESSURE: 105 MMHG | HEIGHT: 68 IN | RESPIRATION RATE: 16 BRPM | DIASTOLIC BLOOD PRESSURE: 79 MMHG | TEMPERATURE: 98 F | WEIGHT: 179.68 LBS | BODY MASS INDEX: 27.23 KG/M2 | OXYGEN SATURATION: 98 % | HEART RATE: 65 BPM

## 2023-01-02 LAB
ANION GAP SERPL CALCULATED.3IONS-SCNC: 14 MEQ/L (ref 8–16)
BASOPHILS # BLD: 1.1 %
BASOPHILS ABSOLUTE: 0.1 THOU/MM3 (ref 0–0.1)
BUN BLDV-MCNC: 13 MG/DL (ref 7–22)
CALCIUM SERPL-MCNC: 8 MG/DL (ref 8.5–10.5)
CHLORIDE BLD-SCNC: 104 MEQ/L (ref 98–111)
CO2: 20 MEQ/L (ref 23–33)
CREAT SERPL-MCNC: 1.2 MG/DL (ref 0.4–1.2)
EKG ATRIAL RATE: 90 BPM
EKG P AXIS: 81 DEGREES
EKG P-R INTERVAL: 156 MS
EKG Q-T INTERVAL: 368 MS
EKG QRS DURATION: 86 MS
EKG QTC CALCULATION (BAZETT): 450 MS
EKG R AXIS: 54 DEGREES
EKG T AXIS: 71 DEGREES
EKG VENTRICULAR RATE: 90 BPM
EOSINOPHIL # BLD: 3 %
EOSINOPHILS ABSOLUTE: 0.2 THOU/MM3 (ref 0–0.4)
ERYTHROCYTE [DISTWIDTH] IN BLOOD BY AUTOMATED COUNT: 13.2 % (ref 11.5–14.5)
ERYTHROCYTE [DISTWIDTH] IN BLOOD BY AUTOMATED COUNT: 47.2 FL (ref 35–45)
GFR SERPL CREATININE-BSD FRML MDRD: > 60 ML/MIN/1.73M2
GLUCOSE BLD-MCNC: 111 MG/DL (ref 70–108)
HCT VFR BLD CALC: 35.8 % (ref 42–52)
HEMOGLOBIN: 12.1 GM/DL (ref 14–18)
IMMATURE GRANS (ABS): 0.03 THOU/MM3 (ref 0–0.07)
IMMATURE GRANULOCYTES: 0.5 %
LACTIC ACID: 1.5 MMOL/L (ref 0.5–2)
LACTIC ACID: 1.9 MMOL/L (ref 0.5–2)
LYMPHOCYTES # BLD: 28.2 %
LYMPHOCYTES ABSOLUTE: 1.6 THOU/MM3 (ref 1–4.8)
MCH RBC QN AUTO: 32.8 PG (ref 26–33)
MCHC RBC AUTO-ENTMCNC: 33.8 GM/DL (ref 32.2–35.5)
MCV RBC AUTO: 97 FL (ref 80–94)
MONOCYTES # BLD: 7.8 %
MONOCYTES ABSOLUTE: 0.4 THOU/MM3 (ref 0.4–1.3)
NUCLEATED RED BLOOD CELLS: 0 /100 WBC
PLATELET # BLD: 173 THOU/MM3 (ref 130–400)
PMV BLD AUTO: 8.6 FL (ref 9.4–12.4)
POTASSIUM REFLEX MAGNESIUM: 4.3 MEQ/L (ref 3.5–5.2)
PROLACTIN: 20.8 NG/ML
RBC # BLD: 3.69 MILL/MM3 (ref 4.7–6.1)
SEG NEUTROPHILS: 59.4 %
SEGMENTED NEUTROPHILS ABSOLUTE COUNT: 3.3 THOU/MM3 (ref 1.8–7.7)
SODIUM BLD-SCNC: 138 MEQ/L (ref 135–145)
T4 FREE: 1.05 NG/DL (ref 0.93–1.76)
WBC # BLD: 5.6 THOU/MM3 (ref 4.8–10.8)

## 2023-01-02 PROCEDURE — 6370000000 HC RX 637 (ALT 250 FOR IP)

## 2023-01-02 PROCEDURE — 95819 EEG AWAKE AND ASLEEP: CPT | Performed by: PSYCHIATRY & NEUROLOGY

## 2023-01-02 PROCEDURE — 6360000002 HC RX W HCPCS

## 2023-01-02 PROCEDURE — A9579 GAD-BASE MR CONTRAST NOS,1ML: HCPCS | Performed by: NURSE PRACTITIONER

## 2023-01-02 PROCEDURE — 6360000004 HC RX CONTRAST MEDICATION: Performed by: NURSE PRACTITIONER

## 2023-01-02 PROCEDURE — 95816 EEG AWAKE AND DROWSY: CPT

## 2023-01-02 PROCEDURE — 36415 COLL VENOUS BLD VENIPUNCTURE: CPT

## 2023-01-02 PROCEDURE — 83605 ASSAY OF LACTIC ACID: CPT

## 2023-01-02 PROCEDURE — 2580000003 HC RX 258

## 2023-01-02 PROCEDURE — 70553 MRI BRAIN STEM W/O & W/DYE: CPT

## 2023-01-02 PROCEDURE — 85025 COMPLETE CBC W/AUTO DIFF WBC: CPT

## 2023-01-02 PROCEDURE — 84439 ASSAY OF FREE THYROXINE: CPT

## 2023-01-02 PROCEDURE — G0378 HOSPITAL OBSERVATION PER HR: HCPCS

## 2023-01-02 PROCEDURE — 96372 THER/PROPH/DIAG INJ SC/IM: CPT

## 2023-01-02 PROCEDURE — 92610 EVALUATE SWALLOWING FUNCTION: CPT

## 2023-01-02 PROCEDURE — 80048 BASIC METABOLIC PNL TOTAL CA: CPT

## 2023-01-02 RX ORDER — LEVETIRACETAM 500 MG/1
500 TABLET ORAL EVERY 12 HOURS
Status: DISCONTINUED | OUTPATIENT
Start: 2023-01-02 | End: 2023-01-02

## 2023-01-02 RX ORDER — OMEPRAZOLE 20 MG/1
20 CAPSULE, DELAYED RELEASE ORAL
Status: DISCONTINUED | OUTPATIENT
Start: 2023-01-03 | End: 2023-01-02 | Stop reason: HOSPADM

## 2023-01-02 RX ORDER — LEVETIRACETAM 750 MG/1
750 TABLET ORAL EVERY 12 HOURS
Qty: 60 TABLET | Refills: 3 | Status: SHIPPED | OUTPATIENT
Start: 2023-01-02

## 2023-01-02 RX ADMIN — PANTOPRAZOLE SODIUM 40 MG: 40 TABLET, DELAYED RELEASE ORAL at 05:56

## 2023-01-02 RX ADMIN — SODIUM CHLORIDE, PRESERVATIVE FREE 10 ML: 5 INJECTION INTRAVENOUS at 07:53

## 2023-01-02 RX ADMIN — ASPIRIN 81 MG: 81 TABLET, COATED ORAL at 08:00

## 2023-01-02 RX ADMIN — LEVETIRACETAM 500 MG: 500 TABLET, FILM COATED ORAL at 08:00

## 2023-01-02 RX ADMIN — ALLOPURINOL 100 MG: 100 TABLET ORAL at 07:52

## 2023-01-02 RX ADMIN — SODIUM CHLORIDE: 9 INJECTION, SOLUTION INTRAVENOUS at 03:12

## 2023-01-02 RX ADMIN — ENOXAPARIN SODIUM 40 MG: 100 INJECTION SUBCUTANEOUS at 08:00

## 2023-01-02 RX ADMIN — GADOTERIDOL 15 ML: 279.3 INJECTION, SOLUTION INTRAVENOUS at 09:22

## 2023-01-02 RX ADMIN — LISINOPRIL 10 MG: 10 TABLET ORAL at 07:52

## 2023-01-02 RX ADMIN — SODIUM CHLORIDE: 9 INJECTION, SOLUTION INTRAVENOUS at 07:52

## 2023-01-02 ASSESSMENT — ENCOUNTER SYMPTOMS
RHINORRHEA: 0
ABDOMINAL PAIN: 0
COUGH: 0
SORE THROAT: 0
VOMITING: 0
SHORTNESS OF BREATH: 0
NAUSEA: 0

## 2023-01-02 ASSESSMENT — PAIN SCALES - GENERAL
PAINLEVEL_OUTOF10: 0
PAINLEVEL_OUTOF10: 0

## 2023-01-02 NOTE — ED NOTES
ED nurse-to-nurse bedside report    Chief Complaint   Patient presents with    Seizures      LOC: alert and orientated to name, place, date  Vital signs   Vitals:    01/01/23 1813 01/01/23 1814   BP: 129/70    Pulse: 91    Resp: 18    Temp: 98 °F (36.7 °C)    TempSrc: Oral    SpO2: 100%    Weight:  180 lb (81.6 kg)      Pain:    Pain Interventions: none  Pain Goal: 0/10  Oxygen: No    Current needs required: collect urine sample   Telemetry: Yes  LDAs:   Peripheral IV 01/01/23 Left Antecubital (Active)   Site Assessment Clean, dry & intact 01/01/23 1827   Phlebitis Assessment No symptoms 01/01/23 1827   Infiltration Assessment 0 01/01/23 1827     Continuous Infusions:   Mobility: Independent  Meyers Fall Risk Score:    Fall Risk 12/21/2021 12/8/2020 9/23/2019 9/23/2019   2 or more falls in past year? no no no no   Fall with injury in past year? no no no no     Fall Interventions: call light in reach  Report given to: Luis Antonio Starr RN  01/01/23 1931

## 2023-01-02 NOTE — PROGRESS NOTES
[facility] Laboratory Technician Worksheet      EEG Date: 2023    Name: Elora Siemens   : 1955   Age: 74 y.o. SEX: male    ROOM: [facility] MRN: [id number]           CSN: [id number]      Ordering Provider: Mercedes Patel  EEG Number: [id number] Time of Test:  2695    Hand: Right   Sedation: no    H.V. Done: No  age  Photic: Yes    Sleep: Yes  Drowsy: Yes   Sleep Deprived: No    Seizures observed: no    Mentality: alert      Clinical History:patient here for new onset seizures, patient had 2 genalised tonic clonic seizures with convulsions. PT was post ictal afterwards and given 1.5G of Keppra and 2MG of ativan in ED. MRI of the brain 2023  Impression       1. No evidence of metastatic disease in the brain. 2. Interval development of mild global volume loss. 3. Mild severity chronic small vessel ischemic changes which has worsened compared to the prior exam.       CT of the  Impression   Unremarkable noncontrast CT brain. No acute intracranial process. .     head 2023    Past Medical History:       Diagnosis Date    Cancer ([facility] [address]     LUNG    COPD (chronic obstructive pulmonary disease) (HCC)     GERD (gastroesophageal reflux disease)     Hypertension     Lung mass     RIGHT UPPER LOBE       Scheduled Meds:   [START ON 1/3/2023] omeprazole  20 mg Oral QAM AC    levETIRAcetam  750 mg Oral Q12H    sodium chloride flush  5-40 mL IntraVENous 2 times per day    enoxaparin  40 mg SubCUTAneous Daily    allopurinol  100 mg Oral Daily    aspirin  81 mg Oral Daily    atorvastatin  20 mg Oral Nightly    lisinopril  10 mg Oral Daily    tiotropium  2 puff Inhalation Daily     Continuous Infusions:   sodium chloride       PRN Meds:.sodium chloride flush, sodium chloride, ondansetron **OR** ondansetron, polyethylene glycol, acetaminophen **OR** acetaminophen, albuterol sulfate HFA    Technician: Isadora Habermann 2023

## 2023-01-02 NOTE — DISCHARGE INSTRUCTIONS
Follow-up with outpatient neurologist, Dr. Giovany Dillon in 2 to 4 weeks. No driving, swimming, baths, or heights until seen and cleared.

## 2023-01-02 NOTE — PROGRESS NOTES
[facility]  SPEECH THERAPY  STRZ MED SURG 8B  Clinical Swallow Evaluation      SLP Individual Minutes  Time In: 6278  Time Out: 4574  Minutes: 8  Timed Code Treatment Minutes: 0 Minutes       Date: 2023  Patient Name: Alka Jones      CSN: [id number]   : 1955  (74 y.o.)  Gender: male   Referring Physician:  Castillo Vera DO    Diagnosis: Seizure ([address]    History of Present Illness/Injury: Patient admitted with above diagnosis. Per chart review, \"Loi Hand is a 74 y.o. male with PMHx of COPD who presents to [facility] with seizure-like activity. Pt was at dinner w/ family and suddenly developed 2 episodes of seizure-like activity and was witnessed by wife and sons. Each episode lasted 5 mins and was associated w/ whole body shaking, drooling, urination, and eyes rolling to the back of his head. Pt does not remember anything during these episodes. A few minutes after the first episode, the second episode began. Pt was postictal on arrival to ER. Pt has fasciculations in quadriceps for a few hours after the seizures but eventually subsided. ED course: Vitals on admission: T 98.4, RR 18, HR 91, /70, 100% on RA. Blood work shows hypercalcemia and elevated creatinine with no other significant abnormality, CT scan no masses or metastasis; at this point and considering persistence of fasciculations after initial benzodiazepine, we will administer second line anticompulsive hands. Pt admitted to hospitalist team for observation and neurology consult. \"     ST consulted to complete clinical swallow evaluation to assess current swallow function and recommend safest level of po intake and/or need for instrumental evaluation.      Past Medical History:   Diagnosis Date    Cancer ([address]     LUNG    COPD (chronic obstructive pulmonary disease) (HCC)     GERD (gastroesophageal reflux disease)     Hypertension     Lung mass     RIGHT UPPER LOBE SUBJECTIVE:  MATTEO Phelan approved completion of clinical swallow evaluation. Upon arrival to room, patient awake in bed with wife at bedside. Patient agreeable to evaluation. Pleasant and cooperative throughout. OBJECTIVE:    Pain:  No pain reported. Current Diet: NPO pending completion of CSE    Respiratory Status:  Room Air    Behavioral Observation:  Alert and Oriented    CRANIAL NERVE ASSESSMENT   CN V (Trigeminal) Closes and Opens Mandible WFL    Rotary Jaw Movement WFL      CN VII (Facial) Cheeks Hold Food out of [place] WFL    Opens, Closes/Seals, Protrudes, Retracts Lips WFL    General Appearance WFL    Sensation WFL      CN X (Vagus - Pharyngeal) Raises Back of Tongue WFL      CN XI (Accessory) Lifts Soft Palate WFL      CN XII (Hypoglossal) Elevates Tongue Up and Back WFL    Protrusion   WFL    Lateralizes Tongue WFL    Sensation Not Tested      Other Observations Dentition WFL     Vocal Quality WFL     Cough DNT      Patient Evaluated Using: Thin Liquids, Puree, and Coarse Solids    Oral Phase:  WFL    Pharyngeal Phase: WFL:  Pharyngeal phase appears WFL but cannot rule out pharyngeal phase deficits from a bedside swallowing evaluation alone. Signs and Symptoms of Laryngeal Penetration/Aspiration: No signs/symptoms of aspiration evident in this evaluation, but cannot rule out silent aspiration. Impressions: Patient presents with oral phase of swallow function that is essentially [facility] with inability to fully discern potential presence of pharyngeal phase deficits without formal instrumentation. All labial/lingual structures intact and appear to be functioning appropriately at bedside. Oral phase highly unremarkable during consumption of hard/textured solids with patient demonstrating adequate mastication pattern for textural breakdown, cohesive bolus formation, and manipulation.  Thin liquids consumed without overt difficulty and with suspected adequate bolus control/containment of fluid bolus. NO overt s/s aspiration exhibited across all consistencies/trials consumed, certainly not able to exclude pharyngeal phase dysfunction and/or airway invasion events in its entirety at bedside alone; however, patient's swallow physiology does appear appropriate to support PO intake without distress. Instrumental evaluation is not warranted at this time. Recommend continuation of regular diet with thin liquids. No further skilled ST services are warranted at this time r/t dysphagia management given baseline status of swallow function achieved; please re-consult should further needs be identified. RECOMMENDATIONS/ASSESSMENT:  Instrumental Evaluation: Instrumental evaluation not indicated at this time. Diet Recommendations:  Regular and Thin Liquids   Strategies:  Full Upright Position, Small Bite/Sip, and Medications Whole with Thin   Rehabilitation Potential: good  Discharge Recommendations: Continue to Assess Pending Progress    EDUCATION:  Learner: Patient and Significant Other  Education:  Reviewed results and recommendations of this evaluation, Reviewed diet and strategies, Reviewed signs, symptoms and risks of aspiration, and Education Related to [employer] [facility]ness  Evaluation of Education: Laura Liao understanding    PLAN:  No further speech therapy services indicated to address dysphagia; however, completion of rprvln-dviiaqcp-athgzecld evaluation warranted given recent seizure    PATIENT GOAL:    Did not state. Will further assess during treatment.     SHORT TERM GOALS:  Short Term Goals  Time Frame for Short Term Goals: 1-2 sessions  Goal 1: Patient will complete apgbjw-ehrcbsfr-ultuxknoo evaluation to assess current cognitive-linguistic skills s/p seizure    LONG TERM GOALS:  No long term goals recommended d/t short EF[facility] [place] ([address]) [address] Narayan[address] M.A., [address]

## 2023-01-02 NOTE — CONSULTS
Neurology Consult Note    Date:1/2/2023       CBLD:[id number]  Patient Kaylen Levine     YOB: 1955     Age:67 y.o. Requesting Physician: Ro Patel MD     Reason for Consult:  Evaluate for new seizure      Chief Complaint:   Chief Complaint   Patient presents with    Seizures       Renetta Langley is a 74 y.o. male with a history of hypertension, COPD, lung cancer who presents to [facility] emergency department on 1/1/2023 for seizure-like activity. Apparently the patient had just returned from bringing a plate to the kitchen and was standing in the living room, started to have full body shaking, and his eyes rolled back into his head. This lasted approximately 5 minutes and he came to and then shortly went back into another similar episode for another 5 minutes approximately. He denies any prodromal symptoms, tongue bite, or bowel incontinence. He did experience urinary incontinence. He denies any history of seizure. CT of his head in the emergency department was negative for any acute intracranial abnormalities. While in the ER he continued to have fasciculations in his quads for hours and was postictal in the ER. He was given 2 mg Ativan and a 1500 mg load of Keppra. He denies any cigarette use, does smoke marijuana on occasion, and reports that he drinks 4-5 beers a day, and had 2 yesterday during lunch. He denies any dizziness, lightheadedness, speech difficulty, headache, or focal weakness. Review of Systems   Review of Systems   Constitutional:  Negative for chills and fever. HENT:  Negative for rhinorrhea and sore throat. Eyes:  Negative for visual disturbance. Respiratory:  Negative for cough and shortness of breath. Cardiovascular:  Negative for chest pain and palpitations. Gastrointestinal:  Negative for abdominal pain, nausea and vomiting. Genitourinary:  Negative for dysuria. Musculoskeletal:  Negative for arthralgias and myalgias. Skin:  Negative for rash. Neurological:  Positive for seizures and weakness. Negative for dizziness, facial asymmetry, speech difficulty, light-headedness, numbness and headaches. Psychiatric/Behavioral:  The patient is not nervous/anxious. Medications   Scheduled Meds:    levETIRAcetam  500 mg Oral Q12H    sodium chloride flush  5-40 mL IntraVENous 2 times per day    enoxaparin  40 mg SubCUTAneous Daily    allopurinol  100 mg Oral Daily    aspirin  81 mg Oral Daily    atorvastatin  20 mg Oral Nightly    lisinopril  10 mg Oral Daily    pantoprazole  40 mg Oral QAM AC    tiotropium  2 puff Inhalation Daily     Continuous Infusions:    sodium chloride      sodium chloride 50 mL/hr at 01/02/23 0416     PRN Meds: sodium chloride flush, sodium chloride, ondansetron **OR** ondansetron, polyethylene glycol, acetaminophen **OR** acetaminophen, albuterol sulfate HFA  Medications Prior to Admission:   No current facility-administered medications on file prior to encounter. Current Outpatient Medications on File Prior to Encounter   Medication Sig Dispense Refill    SPIRIVA RESPIMAT 2.5 MCG/ACT AERS inhaler INHALE 2 PUFFS BY MOUTH ONCE DAILY 1 each 11    lisinopril (PRINIVIL;ZESTRIL) 10 MG tablet Take 1 tablet by mouth once daily 90 tablet 3    allopurinol (ZYLOPRIM) 100 MG tablet TAKE 1 TABLET BY MOUTH ONCE DAILY 90 tablet 3    atorvastatin (LIPITOR) 20 MG tablet TAKE 1 TABLET BY MOUTH AT BEDTIME 90 tablet 3    clobetasol (TEMOVATE) 0.05 % ointment Apply topically 2 times daily. 15 g 3    albuterol sulfate  (90 Base) MCG/ACT inhaler Inhale 2 puffs into the lungs every 6 hours as needed for Wheezing or Shortness of Breath 1 Inhaler 5    aspirin 81 MG tablet Take 81 mg by mouth daily. omeprazole (PRILOSEC OTC) 20 MG tablet Take 20 mg by mouth daily.        Past History    Past Medical History:   has a past medical history of Cancer ([facility] [address] COPD (chronic obstructive pulmonary disease) ([facility] [address] GERD (gastroesophageal reflux disease), Hypertension, and Lung mass. Social History:   reports that he quit smoking about 6 years ago. His smoking use included cigarettes. He started smoking about 49 years ago. He has never used smokeless tobacco. He reports current alcohol use. He reports that he does not use drugs. Family History:   Family History   Problem Relation Age of Onset    Lung Cancer Mother 61    Heart Disease Father 61    Heart Disease Brother        Physical Examination      Vitals:  /74   Pulse 65   Temp 98.4 °F (36.9 °C) (Oral)   Resp 16   Ht 5' 8\" (1.727 m)   Wt 179 lb 10.8 oz (81.5 kg)   SpO2 100%   BMI 27.32 kg/m²   Temp (24hrs), Av.2 °F (36.8 °C), Min:98 °F (36.7 °C), Max:98.4 °F (36.9 °C)      I/O (24Hr): Intake/Output Summary (Last 24 hours) at 2023 0748  Last data filed at 2023 0416  Gross per 24 hour   Intake 169 ml   Output --   Net 169 ml         Physical Exam  Vitals reviewed. Constitutional:       General: He is not in acute distress. Appearance: Normal appearance. He is not ill-appearing. HENT:      Head: Normocephalic and atraumatic. Right Ear: External ear normal.      Left Ear: External ear normal.      Nose: Nose normal.      Mouth/Throat:      Mouth: Mucous membranes are moist.      Pharynx: No oropharyngeal exudate or posterior oropharyngeal erythema. Eyes:      Extraocular Movements: EOM normal.      Pupils: Pupils are equal, round, and reactive to light. Cardiovascular:      Rate and Rhythm: Normal rate and regular rhythm. Heart sounds: Normal heart sounds. No murmur heard. Pulmonary:      Effort: Pulmonary effort is normal. No respiratory distress. Breath sounds: Normal breath sounds. No wheezing. Abdominal:      General: Bowel sounds are normal.      Palpations: Abdomen is soft. Tenderness: There is no abdominal tenderness. Musculoskeletal:         General: Normal range of motion. Right lower leg: No edema. Left lower leg: No edema. Skin:     General: Skin is warm. Findings: No rash. Neurological:      Mental Status: He is alert and oriented to person, place, and time. Coordination: Finger-Nose-Finger Test and Heel to [place] Test normal.      Gait: Gait is intact. Deep Tendon Reflexes:      Reflex Scores:       Bicep reflexes are 2+ on the right side and 2+ on the left side. Brachioradialis reflexes are 2+ on the right side and 2+ on the left side. Patellar reflexes are 2+ on the right side and 2+ on the left side. Psychiatric:         Mood and Affect: Mood normal.         Speech: Speech normal.         Behavior: Behavior normal.     Neurologic Exam     Mental Status   Oriented to person, place, and time. Registration: recalls 3 of 3 objects. Follows 2 step commands. Attention: normal.   Speech: speech is normal   Level of consciousness: alert  Knowledge: good. Able to name object. Able to read. Able to repeat. Cranial Nerves     CN II   Visual fields full to confrontation. CN III, IV, VI   Pupils are equal, round, and reactive to light. Extraocular motions are normal.   Right pupil: Size: 3 mm. Shape: regular. Reactivity: brisk. Left pupil: Size: 3 mm. Shape: regular. Reactivity: brisk. CN V   Facial sensation intact. CN VII   Facial expression full, symmetric.      CN VIII   CN VIII normal.     CN IX, X   CN IX normal.   CN X normal.   Palate: symmetric    CN XI   CN XI normal.   Right trapezius strength: normal  Left trapezius strength: normal    CN XII   Tongue deviation: none    Motor Exam   Muscle bulk: normal  Overall muscle tone: normal  Right arm pronator drift: absent  Left arm pronator drift: absent    BUE: 5/5  BLE: 5/5     Sensory Exam   Light touch normal.     Gait, Coordination, and Reflexes     Gait  Gait: normal    Coordination   Finger to nose coordination: normal  Heel to shin coordination: normal    Tremor   Resting tremor: absent  Intention tremor: absent  Action tremor: absent    Reflexes   Right brachioradialis: 2+  Left brachioradialis: 2+  Right biceps: 2+  Left biceps: 2+  Right patellar: 2+  Left patellar: 2+     Labs/Imaging/Diagnostics   Labs:  CBC:  Recent Labs     01/01/23 1917 01/02/23  0606   WBC 5.2 5.6   RBC 4.20* 3.69*   HGB 13.6* 12.1*   HCT 39.9* 35.8*   MCV 95.0* 97.0*    173     CHEMISTRIES:  Recent Labs     01/01/23 1917 01/02/23  0606   * 138   K 4.6 4.3   CL 98 104   CO2 21* 20*   BUN 15 13   CREATININE 1.3* 1.2   GLUCOSE 129* 111*   MG 1.6  --      COAGULATION STUDIES:No results for input(s): PROTIME, INR, APTT in the last 72 hours. LIVER PROFILE:  Recent Labs     01/01/23 1917   AST 84*   ALT 40   BILITOT 0.4   ALKPHOS 94     CHOLESTEROL AND A1C:No results for input(s): LDLCALC, HDL, CHOL, TRIG, LABA1C in the last 720 hours. Imaging Last 24 Hours:  XR CHEST (2 VW)    Result Date: 1/1/2023  PROCEDURE: XR CHEST (2 VW) CLINICAL INFORMATION: chest pain COMPARISON: 9/9/2019 TECHNIQUE: AP upright and lateral views of the chest were obtained. 1. Lungs hyperinflated and fibroemphysematous in appearance. Prior surgery in left clavicle. Cardiac silhouette small in size. Vascular clips right hilar region from prior surgery. 2. Moderate fibrotic stranding along right hemidiaphragm and in right apex. Flattened hemidiaphragms, consistent with COPD. 3. No acute findings. No infiltrates or effusions are seen. **This report has been created using voice recognition software. It may contain minor errors which are inherent in voice recognition technology. ** Final report electronically signed by Dr. Jack Alexander on 1/1/2023 7:47 PM    CT Head W/O Contrast    Result Date: 1/1/2023  PROCEDURE: NONCONTRAST CT BRAIN CLINICAL INFORMATION: seizure COMPARISON: No prior study TECHNIQUE: Multiple axial 5 mm images of the brain were obtained without administration of intravenous contrast material. ALL CT SCANS AT THIS FACILITY use dose modulation, iterative reconstruction, and/or weight-based dosing when appropriate to reduce radiation dose to as low as reasonably achievable. FINDINGS: VENTRICLES: Normal in size, contour, position. .. PARENCHYMA:  No acute infarction, mass lesion, or intracranial hemorrhage is seen. MASTOID PROCESSES: Well aerated. Normal in appearance. Cloteal Aj PARANASAL SINUSES/CALVARIUM: There are a few retention cysts in the right maxillary sinus. Rebecca bullosa involving the right middle nasal turbinate. No skull fracture. Unremarkable noncontrast CT brain. No acute intracranial process. . **This report has been created using voice recognition software. It may contain minor errors which are inherent in voice recognition technology. ** Final report electronically signed by Dr. Edie Aviles on 1/1/2023 7:54 PM    MRI BRAIN W WO CONTRAST    Result Date: 1/2/2023  PROCEDURE: MRI BRAIN W WO CONTRAST CLINICAL INFORMATIONfirst time seizure. History of lung cancer. COMPARISON: Brain MRI 3/12/2016. TECHNIQUE: Multiplanar and multiple spin echo T1 and T2-weighted images were obtained through the brain before and after the administration of intravenous contrast. FINDINGS: The diffusion-weighted images are normal. The brain volume is mildly reduced. This has developed since the prior examination. .There are no intra-or extra-axial collections. There is no hydrocephalus, midline shift or mass effect. On the FLAIR and T2-weighted sequences, there is mild signal hyperintensity scattered in the white matter of the brain. This is most consistent with mild severity chronic small vessel ischemic changes. This has progressed compared to the prior examination. On the gradient echo T2-weighted images, there is mineralization in the medial aspects of the basal ganglia. There is no abnormal enhancement in the brain. The major intracranial vascular flow voids are present.   The midline craniocervical junction structures are normal.  The brainstem and pituitary gland are normal. There is persistent mucosal thickening in the right maxillary sinus. 1. No evidence of metastatic disease in the brain. 2. Interval development of mild global volume loss. 3. Mild severity chronic small vessel ischemic changes which has worsened compared to the prior exam. **This report has been created using voice recognition software. It may contain minor errors which are inherent in voice recognition technology. ** Final report electronically signed by Dr. Deonna Piña on 1/2/2023 9:34 AM           Assessment and Plan:        First-time seizure  CT head negative for any acute intracranial abnormalities  MRI brain with and without contrast negative for any acute intracranial abnormalities, metastasis  EEG completed, read pending  Keppra 750 mg twice daily  Follow-up with outpatient neurologist, Dr. Giovany Dillon in 2 to 4 weeks. No driving, swimming, baths, or heights until seen and cleared. Seizure precautions    This patient was seen and evaluated with Dr. Mabel Camejo and he is in agreement with the assessment and plan. Electronically signed by DEMI Portillo CNP on 1/2/23 at 2:25 PM EST    I agree with evaluation and plan. 74year old man with new onset seizure (2 GTCs). No recurrent seizures after AEDs. MRI showed no acute findings. Follow up with neurology clinic.     Sophie Andino MD

## 2023-01-02 NOTE — ED NOTES
Patient transported to  [facility] by cart in stable condition. Patient monitored on cardiac telemetry. IV infusing and line is patent.         Buddy Patel, EMT-P  01/02/23 6523

## 2023-01-02 NOTE — PROCEDURES
Date: 1/2/2023  Referring physician: London Castaneda DO    Indication  Patient aged 74 y with seizures. EEG done to assess for epileptiform activity. Introduction  This routine 20-minute EEG was recorded using the International 10-20 System on a [employer] workstation at 256 samples/s. Automated spike and seizure detection algorithms were applied. Description  During the maximal alert state, a well-regulated, symmetric, and reactive 8 Hz posterior dominant rhythm was seen. No consistent focal slowing or interhemispheric asymmetry was noted. Stage I and stage II sleep were observed. There were no interictal epileptiform discharges or electrographic seizures. Activations  Hyperventilation was not performed. Intermittent photic stimulation was performed and demonstrated no posterior driving response. Impression  Normal awake and sleep EEG. EKG lead did not show clear arrhythmia, if still in concern consider formal EKG or correlation with telemetry. No epileptiform discharges were identified. Please note the absence of such activity on this record cannot conclusively rule out an epileptic disorder. If such is still clinically suspected, a repeat study with sleep deprivation and/or prolonged sampling may be helpful. Oswaldo Lynne MD  Epilepsy Board Certified. Neurology Board Certified.     Electronically Signed

## 2023-01-02 NOTE — H&P
Internal Medicine Resident History and Physical          Patient: Ana Medrano  : 1955  MRN: [id number]     Acct: [de-identified]    PCP: Mary Cruz DO  Date of Admission: 2023  Date of Service: Pt seen/examined on 23  and Admitted to Inpatient with expected LOS less than two midnights due to medical therapy. Hospital Problems             Last Modified POA    * (Principal) New onset seizure ([address] 2023 Yes       Assessment and Plan:  New onset seizures: Pt denies seizures or any neurological hx in the past. 2 episodes of generalized tonic-clonic seizures on 23. No aura. Convulsions during both episodes. Post-ictal state after each episode. Persistent fasciculation in quadriceps muscles s/p Benzodiazepine. CT head w/o contrast was unremarkable for acute intracranial process. Prolactin slightly elevated. Given 1.5 g of Keppra and 2 mg Ativan in ER. Keppra 500 mg q 12 hours  Neurology consulted   EEG ordered  Seizure precautions  SLP eval  Elevated lactic acid 2/2 likely seizure: Lactic acid 3.0 POA. Trend lactic acid  Stage 1 SRINATH: No hx of CKD. Creatinine 1.3 POA (baseline 1.1)  Gentle IVF  Hypothyroidism  Free T4 ordered   Mild hyponatremia; acute: Likely due to dehydration. Na 134 POA  Gentle IVF  UDS positive for Cannabinoid   Moderate COPD, not in exacerbation: Last PFT: 10/4/22 - moderate COPD   Resumed home Spiriva and albuterol   HTN  Resumed home Lisinopril   HLD  Resumed home Atorvastatin  Hx of tobacco abuse: 41 pack year hx, quit in   Hx of SCLCA s/p RUL lobectomy 2016         =======================================================================      Chief Complaint:  Seizure    History Of Present Illness:  Ana Medrano is a 74 y.o. male with PMHx of COPD who presents to [facility] with seizure-like activity. Pt was at dinner w/ family and suddenly developed 2 episodes of seizure-like activity and was witnessed by wife and sons. Each episode lasted 5 mins and was associated w/ whole body shaking, drooling, urination, and eyes rolling to the back of his head. Pt does not remember anything during these episodes. A few minutes after the first episode, the second episode began. Pt was postictal on arrival to ER. Pt has fasciculations in quadriceps for a few hours after the seizures but eventually subsided. ED course: Vitals on admission: T 98.4, RR 18, HR 91, /70, 100% on RA. Blood work shows hypercalcemia and elevated creatinine with no other significant abnormality, CT scan no masses or metastasis; at this point and considering persistence of fasciculations after initial benzodiazepine, we will administer second line anticompulsive hands. Pt admitted to hospitalist team for observation and neurology consult. Past Medical History:        Diagnosis Date    Cancer ([address]     LUNG    COPD (chronic obstructive pulmonary disease) (HCC)     GERD (gastroesophageal reflux disease)     Hypertension     Lung mass     RIGHT UPPER LOBE       Past Surgical History:        Procedure Laterality Date    COLONOSCOPY      FRACTURE SURGERY      collar bone    LOBECTOMY Right 3/24/16    upper    LUNG BIOPSY Right 3/3/16    Dr Diamond Langley         Medications Prior to Admission:   Prior to Admission medications    Medication Sig Start Date End Date Taking? Authorizing Provider   SPIRIVA RESPIMAT 2.5 MCG/ACT AERS inhaler INHALE 2 PUFFS BY MOUTH ONCE DAILY 8/15/22   Tereasa Rubins, DO   lisinopril (PRINIVIL;ZESTRIL) 10 MG tablet Take 1 tablet by mouth once daily 8/8/22   Tereasa Rubins, DO   allopurinol (ZYLOPRIM) 100 MG tablet TAKE 1 TABLET BY MOUTH ONCE DAILY 12/21/21   Tereasa Rubins, DO   atorvastatin (LIPITOR) 20 MG tablet TAKE 1 TABLET BY MOUTH AT BEDTIME 12/21/21   Tereasa Rubins, DO   clobetasol (TEMOVATE) 0.05 % ointment Apply topically 2 times daily.  6/22/21   Tereasa Rubins, DO   albuterol sulfate  (90 Base) MCG/ACT inhaler Inhale 2 puffs into the lungs every 6 hours as needed for Wheezing or Shortness of Breath 6/9/20   Groves Graces, DO   aspirin 81 MG tablet Take 81 mg by mouth daily. Historical Provider, MD   omeprazole (PRILOSEC OTC) 20 MG tablet Take 20 mg by mouth daily. Historical Provider, MD       Allergies:  Adhesive tape    Social History:    The patient currently lives at home w/ wife. Tobacco use:   reports that he quit smoking about 6 years ago. His smoking use included cigarettes. He started smoking about 49 years ago. He has never used smokeless tobacco.  Alcohol use:   reports current alcohol use. Drug use:  reports no history of drug use. Family History:        Problem Relation Age of Onset    Lung Cancer Mother 61    Heart Disease Father 61    Heart Disease Brother        Review of Systems:   Pertinent positives and negatives as noted in the HPI. Otherwise complete ROS negative. Physical Exam:    /74   Pulse 65   Temp 98.4 °F (36.9 °C) (Oral)   Resp 16   Ht 5' 8\" (1.727 m)   Wt 179 lb 10.8 oz (81.5 kg)   SpO2 100%   BMI 27.32 kg/m²       General appearance: No apparent distress, appears stated age. Eyes:  Pupils equal, round, and reactive to light. Conjunctivae/corneas clear. HENT: Head normal in appearance. External nares normal.  Oral mucosa moist without lesions. Hearing grossly intact. Neck: Supple, with full range of motion. Trachea midline. No gross JVD appreciated. Respiratory:  Normal respiratory effort. Clear to auscultation, bilaterally without rales or wheezes or rhonchi. Cardiovascular: Normal rate, regular rhythm with normal S1/S2 without murmurs. No lower extremity edema. Abdomen: Soft, non-tender, non-distended with normal bowel sounds. Musculoskeletal: No joint swelling or tenderness. Normal tone. No abnormal movements. Skin: Warm and dry. No rashes or lesions.   Neurologic:  No focal sensory/motor deficits in the upper and lower extremities. Cranial nerves:  grossly non-focal 2-12. Neuro exam unremarkable    Psychiatric: Alert and oriented, normal insight and thought content. Capillary Refill: Brisk,< 3 seconds. Peripheral Pulses: +2 palpable, equal bilaterally. Labs:     Recent Labs     01/01/23 1917   WBC 5.2   HGB 13.6*   HCT 39.9*        Recent Labs     01/01/23 1917   *   K 4.6   CL 98   CO2 21*   BUN 15   CREATININE 1.3*   CALCIUM 8.6     Recent Labs     01/01/23 1917   AST 84*   ALT 40   BILITOT 0.4   ALKPHOS 94     No results for input(s): INR in the last 72 hours. Recent Labs     01/01/23 1917   TROPONINT < 0.010     No results for input(s): PROCAL in the last 72 hours. Lab Results   Component Value Date/Time    NITRU NEGATIVE 01/01/2023 10:49 PM    WBCUA 0-2 01/01/2023 10:49 PM    BACTERIA NONE SEEN 01/01/2023 10:49 PM    RBCUA 0-2 01/01/2023 10:49 PM    BLOODU NEGATIVE 01/01/2023 10:49 PM    SPECGRAV 1.019 01/01/2023 10:49 PM         Radiology:     CT Head W/O Contrast   Final Result   Unremarkable noncontrast CT brain. No acute intracranial process. .          **This report has been created using voice recognition software. It may contain minor errors which are inherent in voice recognition technology. **      Final report electronically signed by Dr. Veronika Wells on 1/1/2023 7:54 PM      XR CHEST (2 VW)   Final Result   1. Lungs hyperinflated and fibroemphysematous in appearance. Prior surgery in left clavicle. Cardiac silhouette small in size. Vascular clips right hilar region from prior surgery. 2. Moderate fibrotic stranding along right hemidiaphragm and in right apex. Flattened hemidiaphragms, consistent with COPD. 3. No acute findings. No infiltrates or effusions are seen. **This report has been created using voice recognition software. It may contain minor errors which are inherent in voice recognition technology. **      Final report electronically signed by Dr. Clau Sewell Leighton on 1/1/2023 7:47 PM             EKG:  I have reviewed the EKG with the following interpretation: EKG pending      PT/OT Eval Status: N/a  Diet: Diet NPO  DVT prophylaxis: Lovenox  Code Status: Full Code  Disposition: admit to stepdown      Thank you Katie Valenzuela DO for the opportunity to be involved in this patient's care.     Electronically signed by Rosamond Lanes, DO PGY-1 on 1/2/2023 at 4:16 AM.     Case discussed with Attending, Dr. Collin Roberts MD.

## 2023-01-02 NOTE — DISCHARGE SUMMARY
Hospital Medicine Discharge Summary      Patient Identification:   Khurram Daley   : 1955  MRN: [id number]   Account: [de-identified]   Patient's PCP: Chaitanya Hernández DO    Admit Date: 2023   Discharge Date:   2023    Admitting Physician: Indu Guzman MD  Discharge Physician: Enriqueta Barrios MD       Discharge Diagnoses:  New onset seizures: CT head w/o contrast was unremarkable for acute intracranial process. MRI did not show any mass. Evaluated by neurology patient was a started on Keppra p.o. 750 every 12 hours. Patient was asked to follow-up with neurology as an outpatient in 2 weeks. Patient was told not to drive, swim or go up to heights until cleared by neurology. Elevated lactic acid 2/2 likely seizure: Resolved. Stage 1 SRINATH: Resolved. Mild hyponatremia; likely due to dehydration resolved with fluids. UDS positive for Cannabinoid. Counseled  Moderate COPD, not in exacerbation  HTN  HLD  Hx of tobacco abuse: 41 pack year hx, quit in   Hx of SCLCA s/p RUL lobectomy 2016     Hospital Course:   Khurram Daley is a 74 y.o. male with PMHx of COPD who presents to [address] [place] [address] with seizure-like activity. Pt was at dinner w/ family and suddenly developed 2 episodes of seizure-like activity and was witnessed by wife and sons. Each episode lasted 5 mins and was associated w/ whole body shaking, drooling, urination, and eyes rolling to the back of his head. Pt does not remember anything during these episodes. A few minutes after the first episode, the second episode began. Pt was postictal on arrival to ER. Pt has fasciculations in quadriceps for a few hours after the seizures but eventually subsided. ED course: Vitals on admission: T 98.4, RR 18, HR 91, /70, 100% on RA.  Blood work shows hypercalcemia and elevated creatinine with no other significant abnormality, CT scan no masses or metastasis; at this point and considering persistence of fasciculations after initial benzodiazepine, we will administer second line anticompulsive hands. Pt admitted to hospitalist team for observation and neurology consult. Patient was evaluated by neurology was started on Keppra 750 mg twice daily. And patient can be discharged safely home with outpatient follow-up with neurology Dr. Jeanne Teague in 2 to 4 weeks. Patient was told not to drive, swim, go up to the heights until cleared by neurology. The patient was seen and examined on day of discharge and this discharge summary is in conjunction with any daily progress note from day of discharge. The patient is discharged in stable condition. Exam:   Vitals:  Vitals:    01/02/23 0017 01/02/23 0314 01/02/23 0745 01/02/23 1313   BP: 120/73 105/74 116/71 105/79   Pulse: 77 65 66 65   Resp: 16 16 16 16   Temp: 98.2 °F (36.8 °C) 98.4 °F (36.9 °C) 97.4 °F (36.3 °C) 98 °F (36.7 °C)   TempSrc: Oral Oral Oral Oral   SpO2: 100% 100% 99% 98%   Weight: 179 lb 10.8 oz (81.5 kg)      Height: 5' 8\" (1.727 m)        Weight: Weight: 179 lb 10.8 oz (81.5 kg)     General appearance: No apparent distress, well developed, appears stated age. Eyes:  Pupils equal, round, and reactive to light. Conjunctivae/corneas clear. HENT: Head normal in appearance. External nares normal.  Oral mucosa moist without lesions. Hearing grossly intact. Neck: Supple, with full range of motion. Trachea midline. No gross JVD appreciated. Respiratory:  Normal respiratory effort. Clear to auscultation, bilaterally without rales or wheezes or rhonchi. Cardiovascular: Normal rate, regular rhythm with normal S1/S2 without murmurs. No lower extremity edema. Abdomen: Soft, non-tender, non-distended with normal bowel sounds. Musculoskeletal: There is no joint swelling or tenderness. Normal tone. No abnormal movements. Skin: Warm and dry. No rashes or lesions. Neurologic:  No focal sensory/motor deficits in the upper and lower extremities. Cranial nerves:  grossly non-focal 2-12. Psychiatric: Alert and oriented, normal insight and thought content. Capillary Refill: Brisk,< 3 seconds. Peripheral Pulses: +2 palpable, equal bilaterally. Significant Diagnostic Studies    Labs: For convenience and continuity at follow-up the following most recent labs are provided:  CBC:    Lab Results   Component Value Date/Time    WBC 5.6 01/02/2023 06:06 AM    HGB 12.1 01/02/2023 06:06 AM    HCT 35.8 01/02/2023 06:06 AM     01/02/2023 06:06 AM     Renal:    Lab Results   Component Value Date/Time     01/02/2023 06:06 AM    K 4.3 01/02/2023 06:06 AM     01/02/2023 06:06 AM    CO2 20 01/02/2023 06:06 AM    BUN 13 01/02/2023 06:06 AM    CREATININE 1.2 01/02/2023 06:06 AM    CALCIUM 8.0 01/02/2023 06:06 AM       Radiology:   MRI BRAIN W WO CONTRAST   Final Result       1. No evidence of metastatic disease in the brain. 2. Interval development of mild global volume loss. 3. Mild severity chronic small vessel ischemic changes which has worsened compared to the prior exam.               **This report has been created using voice recognition software. It may contain minor errors which are inherent in voice recognition technology. **         Final report electronically signed by Dr. Victoria Farrell on 1/2/2023 9:34 AM      CT Head W/O Contrast   Final Result   Unremarkable noncontrast CT brain. No acute intracranial process. .          **This report has been created using voice recognition software. It may contain minor errors which are inherent in voice recognition technology. **      Final report electronically signed by Dr. Juany Rm on 1/1/2023 7:54 PM      XR CHEST (2 VW)   Final Result   1. Lungs hyperinflated and fibroemphysematous in appearance. Prior surgery in left clavicle. Cardiac silhouette small in size. Vascular clips right hilar region from prior surgery.    2. Moderate fibrotic stranding along right hemidiaphragm and in right apex. Flattened hemidiaphragms, consistent with COPD. 3. No acute findings. No infiltrates or effusions are seen. **This report has been created using voice recognition software. It may contain minor errors which are inherent in voice recognition technology. **      Final report electronically signed by Dr. Monica Jacobs on 1/1/2023 7:47 PM             Consults:   IP CONSULT TO NEUROLOGY      Disposition: Home  Condition at Discharge: Stable    Code Status:  Full Code     Patient Instructions:    Discharge lab work: None  Activity: activity as tolerated  Diet: ADULT DIET; Regular      Follow-up visits:   John Verde MD  [address] [place] [address]  [phone]    Follow up in 2 week(s)           Discharge Medications:        Medication List        START taking these medications      levETIRAcetam 750 MG tablet  Commonly known as: KEPPRA  Take 1 tablet by mouth in the morning and 1 tablet in the evening. CONTINUE taking these medications      albuterol sulfate  (90 Base) MCG/ACT inhaler  Commonly known as: PROVENTIL;VENTOLIN;PROAIR  Inhale 2 puffs into the lungs every 6 hours as needed for Wheezing or Shortness of Breath     allopurinol 100 MG tablet  Commonly known as: ZYLOPRIM  TAKE 1 TABLET BY MOUTH ONCE DAILY     aspirin 81 MG tablet     atorvastatin 20 MG tablet  Commonly known as: LIPITOR  TAKE 1 TABLET BY MOUTH AT BEDTIME     clobetasol 0.05 % ointment  Commonly known as: Temovate  Apply topically 2 times daily.      lisinopril 10 MG tablet  Commonly known as: PRINIVIL;ZESTRIL  Take 1 tablet by mouth once daily     omeprazole 20 MG tablet  Commonly known as: PRILOSEC OTC     Spiriva Respimat 2.5 MCG/ACT Aers inhaler  Generic drug: tiotropium  INHALE 2 PUFFS BY MOUTH ONCE DAILY               Where to Get Your Medications        These medications were sent to [address] - F [phone] [address], [place] [address]      Phone: [phone]   levETIRAcetam 750 MG tablet              Time Spent on discharge is 25 minutes in the examination, evaluation, counseling and review of medications and discharge plan. Thank you Augustus Navarro DO for the opportunity to be involved in this patient's care.       Signed:    Electronically signed by Jordin Parks MD on 1/2/23 at 3:23 PM EST

## 2023-01-03 ENCOUNTER — CARE COORDINATION (OUTPATIENT)
Dept: CASE MANAGEMENT | Age: 68
End: 2023-01-03

## 2023-01-03 ENCOUNTER — TELEPHONE (OUTPATIENT)
Dept: FAMILY MEDICINE CLINIC | Age: 68
End: 2023-01-03

## 2023-01-03 DIAGNOSIS — R56.9 SEIZURE (HCC): Primary | ICD-10-CM

## 2023-01-03 PROCEDURE — 1111F DSCHRG MED/CURRENT MED MERGE: CPT | Performed by: FAMILY MEDICINE

## 2023-01-03 NOTE — CARE COORDINATION
[facility] Care Transitions Initial Follow Up Call    Call within 2 business days of discharge: Yes    Care Transition Nurse contacted the patient by telephone to perform post hospital discharge assessment. Verified name and  with patient as identifiers. Provided introduction to self, and explanation of the Care Transition Nurse role. Patient: Ana Medrano Patient : 1955   MRN: [id number]  Reason for Admission: seizure   Discharge Date: 23 RARS: No data recorded    Last Discharge  Street       Date Complaint Diagnosis Description Type Department Provider    23 Seizures Seizure [facility]) ED to Hosp-Admission (Discharged) (ADMITTED) [facility] [address] Jesse Vo MD; Minor [place]. .. Was this an external facility discharge? No Discharge Facility:     Challenges to be reviewed by the provider   Additional needs identified to be addressed with provider: Yes  Neuro HFU 2 weeks needs scheduled               Method of communication with provider:  pt. Called neuro office . CTN call to Huong Lam today and he says he is feeling fine. Denies seizures, headache, visual disturbances, weakness, dizziness, chest pain, n/v/d, fever, chills. Declined 7 day HFU PCP. Pt. LM w/ neurology office to schedule 2 week f/u. We discussed the importance of 7 day HFU. He said he will contact PCP if he does not hear back form neurology by [facility]. We also reviewed the instructions on his AVS:  No driving, swimming, baths, or heights until seen/cleared by Neuro. He expressed understanding. Eating, drinking & sleeping ok. Denies problems w/ urination/bowels. No other concerns voiced at this time. CTN # given to pt. ALICIA Kaur Care Transitions [phone]      Care Transition Nurse reviewed discharge instructions, medical action plan, and red flags with patient who verbalized understanding. The patient was given an opportunity to ask questions and does not have any further questions or concerns at this time.  Were discharge instructions available to patient? Yes. Reviewed appropriate site of care based on symptoms and resources available to patient including: PCP  Specialist  When to call 911. The patient agrees to contact the PCP office for questions related to their healthcare. Advance Care Planning:   Does patient have an Advance Directive: not on file. Medication reconciliation was performed with patient, who verbalizes understanding of administration of home medications. Medications reviewed, 1111F entered: yes    Was patient discharged with a pulse oximeter? no    Non-face-to-face services provided:  Scheduled appointment with PCP-declined  Scheduled appointment with Specialist-TBD  Obtained and reviewed discharge summary and/or continuity of care documents    Offered patient enrollment in the Remote Patient Monitoring (RPM) program for in-home monitoring: Patient is not eligible for RPM program.    Care Transitions 24 Hour Call    Schedule Follow Up Appointment with PCP: Completed  Do you have a copy of your discharge instructions?: Yes  Do you have all of your prescriptions and are they filled?: Yes  Have you been contacted by a [facility] Pharmacist?: No  Have you scheduled your follow up appointment?: No (Comment: SERVANDO w/ neurology office today)  Do you feel like you have everything you need to keep you well at home?: Yes  Care Transitions Interventions     Transportation Support: Declined      DME Assistance: Declined   Disease Association: Completed               Follow Up  Future Appointments   Date Time Provider Jourdan Garcia   3/9/2023  8:00 AM STR CT IMAGING RM1  [facility] EXP STR Radiolog   3/14/2023  8:00 AM DEMI Giang - [address]   6/13/2023  8:15 AM Katie Valenzuela DO [address] Transition Nurse provided contact information. Plan for follow-up call in 1-2 days based on severity of symptoms and risk factors.   Plan for next call: symptom management-seizure activity?  follow-up appointment-neuro or PCP ? ?     Beatriz Vidal RN

## 2023-01-03 NOTE — TELEPHONE ENCOUNTER
Care Transitions Initial Follow Up Call    Outreach made within 2 business days of discharge: Yes    Patient: Carmen Clayton Patient : 1955   MRN: [id number]  Reason for Admission: There are no discharge diagnoses documented for the most recent discharge. Discharge Date: 23       Spoke with: Patient     Discharge department/facility: [facility]     TCM Interactive Patient Contact:  Was patient able to fill all prescriptions: Yes  Was patient instructed to bring all medications to the follow-up visit: Yes  Is patient taking all medications as directed in the discharge summary? Yes  Does patient understand their discharge instructions: Yes  Does patient have questions or concerns that need addressed prior to 7-14 day follow up office visit: no    Scheduled appointment with PCP within 7-14 days    Follow Up  Future Appointments   Date Time Provider Jourdan Garcia   3/9/2023  8:00 AM STR CT IMAGING RM1  [facility] EXP STR Radiolog   3/14/2023  8:00 AM DEMI Nair -  [address] [place] [address]   2023  8:15 AM Trudee Cabot,  Meeting Satya Samson CMA (AAMA)      Patient declines follow up at this time. Patient is following with [facility] neurology at this time.

## 2023-01-05 ENCOUNTER — OFFICE VISIT (OUTPATIENT)
Dept: FAMILY MEDICINE CLINIC | Age: 68
End: 2023-01-05

## 2023-01-05 VITALS
DIASTOLIC BLOOD PRESSURE: 56 MMHG | RESPIRATION RATE: 20 BRPM | SYSTOLIC BLOOD PRESSURE: 100 MMHG | BODY MASS INDEX: 27.66 KG/M2 | WEIGHT: 181.9 LBS | HEART RATE: 68 BPM

## 2023-01-05 DIAGNOSIS — Z09 HOSPITAL DISCHARGE FOLLOW-UP: ICD-10-CM

## 2023-01-05 DIAGNOSIS — J44.9 CHRONIC OBSTRUCTIVE PULMONARY DISEASE, UNSPECIFIED COPD TYPE (HCC): ICD-10-CM

## 2023-01-05 DIAGNOSIS — M10.00 IDIOPATHIC GOUT, UNSPECIFIED CHRONICITY, UNSPECIFIED SITE: ICD-10-CM

## 2023-01-05 DIAGNOSIS — E78.49 OTHER HYPERLIPIDEMIA: ICD-10-CM

## 2023-01-05 DIAGNOSIS — R56.9 SEIZURE (HCC): Primary | ICD-10-CM

## 2023-01-05 DIAGNOSIS — N17.9 AKI (ACUTE KIDNEY INJURY) (HCC): ICD-10-CM

## 2023-01-05 DIAGNOSIS — I10 ESSENTIAL HYPERTENSION: ICD-10-CM

## 2023-01-05 DIAGNOSIS — E87.1 HYPONATREMIA: ICD-10-CM

## 2023-01-05 RX ORDER — ALBUTEROL SULFATE 90 UG/1
2 AEROSOL, METERED RESPIRATORY (INHALATION) EVERY 6 HOURS PRN
Qty: 1 EACH | Refills: 5 | Status: SHIPPED | OUTPATIENT
Start: 2023-01-05

## 2023-01-05 RX ORDER — LISINOPRIL 10 MG/1
TABLET ORAL
Qty: 90 TABLET | Refills: 3 | Status: SHIPPED
Start: 2023-01-05

## 2023-01-05 RX ORDER — ATORVASTATIN CALCIUM 20 MG/1
TABLET, FILM COATED ORAL
Qty: 90 TABLET | Refills: 3 | Status: SHIPPED | OUTPATIENT
Start: 2023-01-05

## 2023-01-05 RX ORDER — ALLOPURINOL 100 MG/1
TABLET ORAL
Qty: 90 TABLET | Refills: 3 | Status: SHIPPED | OUTPATIENT
Start: 2023-01-05

## 2023-01-05 ASSESSMENT — PATIENT HEALTH QUESTIONNAIRE - PHQ9
SUM OF ALL RESPONSES TO PHQ QUESTIONS 1-9: 0
SUM OF ALL RESPONSES TO PHQ QUESTIONS 1-9: 0
2. FEELING DOWN, DEPRESSED OR HOPELESS: 0
1. LITTLE INTEREST OR PLEASURE IN DOING THINGS: 0
SUM OF ALL RESPONSES TO PHQ9 QUESTIONS 1 & 2: 0
SUM OF ALL RESPONSES TO PHQ QUESTIONS 1-9: 0
SUM OF ALL RESPONSES TO PHQ QUESTIONS 1-9: 0

## 2023-01-05 NOTE — PROGRESS NOTES
Post-Discharge Transitional Care Management Services      Khurram Daley   YOB: 1955    Date of Visit:  1/5/2023  30 Day Post-Discharge Date: 2/2/23  Chief Complaint   Patient presents with    Follow-Up from [facility] 1/2/23     Admit Date: 1/1/2023   Discharge Date:   1/2/2023     Admitting Physician: Indu Guzman MD  Discharge Physician: Enriqueta Barrios MD         Discharge Diagnoses:  New onset seizures: CT head w/o contrast was unremarkable for acute intracranial process. MRI did not show any mass. Evaluated by neurology patient was a started on Keppra p.o. 750 every 12 hours. Patient was asked to follow-up with neurology as an outpatient in 2 weeks. Patient was told not to drive, swim or go up to heights until cleared by neurology. Elevated lactic acid 2/2 likely seizure: Resolved. Stage 1 SRINATH: Resolved. Mild hyponatremia; likely due to dehydration resolved with fluids. UDS positive for Cannabinoid. 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 male with PMHx of COPD who presents to [facility] with seizure-like activity. Pt was at dinner w/ family and suddenly developed 2 episodes of seizure-like activity and was witnessed by wife and sons. Each episode lasted 5 mins and was associated w/ whole body shaking, drooling, urination, and eyes rolling to the back of his head. Pt does not remember anything during these episodes. A few minutes after the first episode, the second episode began. Pt was postictal on arrival to ER. Pt has fasciculations in quadriceps for a few hours after the seizures but eventually subsided. ED course: Vitals on admission: T 98.4, RR 18, HR 91, /70, 100% on RA.  Blood work shows hypercalcemia and elevated creatinine with no other significant abnormality, CT scan no masses or metastasis; at this point and considering persistence of fasciculations after initial benzodiazepine, we will administer second line anticompulsive hands. Pt admitted to hospitalist team for observation and neurology consult. Patient was evaluated by neurology was started on Keppra 750 mg twice daily. And patient can be discharged safely home with outpatient follow-up with neurology Dr. Carmen Flowers in 2 to 4 weeks. Patient was told not to drive, swim, go up to the heights until cleared by neurology. The patient was seen and examined on day of discharge and this discharge summary is in conjunction with any daily progress note from day of discharge. The patient is discharged in stable condition. Allergies   Allergen Reactions    Adhesive Tape      Outpatient Medications Marked as Taking for the 1/5/23 encounter (Office Visit) with Moses Hagan, DO   Medication Sig Dispense Refill    lisinopril (PRINIVIL;ZESTRIL) 10 MG tablet Take 1/2 tablet by mouth once daily 90 tablet 3    atorvastatin (LIPITOR) 20 MG tablet TAKE 1 TABLET BY MOUTH AT BEDTIME 90 tablet 3    allopurinol (ZYLOPRIM) 100 MG tablet TAKE 1 TABLET BY MOUTH ONCE DAILY 90 tablet 3    albuterol sulfate HFA (PROVENTIL;VENTOLIN;PROAIR) 108 (90 Base) MCG/ACT inhaler Inhale 2 puffs into the lungs every 6 hours as needed for Wheezing or Shortness of Breath 1 each 5    levETIRAcetam (KEPPRA) 750 MG tablet Take 1 tablet by mouth in the morning and 1 tablet in the evening. 60 tablet 3    SPIRIVA RESPIMAT 2.5 MCG/ACT AERS inhaler INHALE 2 PUFFS BY MOUTH ONCE DAILY 1 each 11    aspirin 81 MG tablet Take 81 mg by mouth daily. omeprazole (PRILOSEC OTC) 20 MG tablet Take 20 mg by mouth daily. Vitals:    01/05/23 1442   BP: (!) 100/56   Site: Left Upper Arm   Position: Sitting   Cuff Size: Medium Adult   Pulse: 68   Resp: 20   Weight: 181 lb 14.4 oz (82.5 kg)     Body mass index is 27.66 kg/m².      Wt Readings from Last 3 Encounters:   01/05/23 181 lb 14.4 oz (82.5 kg)   01/02/23 179 lb 10.8 oz (81.5 kg)   12/13/22 183 lb 11.2 oz (83.3 kg)     BP Readings from Last 3 Encounters:   01/05/23 (!) 100/56   01/02/23 105/79   12/13/22 118/60        Patient was admitted to [address] [place] [address] from 1/1/23 to 1/2/23 for seizure. Inpatient course: Discharge summary reviewed- see chart. Current status: improved    Review of Systems:  A comprehensive review of systems was negative except for what was noted in the HPI. Physical Exam:  General Appearance: alert and oriented to person, place and time, well developed and well- nourished, in no acute distress  Skin: warm and dry, no rash or erythema  Head: normocephalic and atraumatic  Eyes: pupils equal, round, and reactive to light, extraocular eye movements intact, conjunctivae normal  ENT: tympanic membrane, external ear and ear canal normal bilaterally, nose without deformity, nasal mucosa and turbinates normal without polyps  Neck: supple and non-tender without mass, no thyromegaly or thyroid nodules, no cervical lymphadenopathy  Pulmonary/Chest: clear to auscultation bilaterally- no wheezes, rales or rhonchi, normal air movement, no respiratory distress  Cardiovascular: normal rate, regular rhythm, normal S1 and S2, no murmurs, rubs, clicks, or gallops, distal pulses intact, no carotid bruits  Abdomen: soft, non-tender, non-distended, normal bowel sounds, no masses or organomegaly  Extremities: no cyanosis, clubbing or edema  Musculoskeletal: normal range of motion, no joint swelling, deformity or tenderness  Neurologic: reflexes normal and symmetric, no cranial nerve deficit, gait, coordination and speech normal    Initial post-discharge communication occurred between medical assistant and patient on 1/3/23- see documentation in chart: telephone encounter.     Assessment/Plan:  Eboni Ford was seen today for follow-up from hospital.    Diagnoses and all orders for this visit:    Seizure ([facility]    Essential hypertension  -     lisinopril (PRINIVIL;ZESTRIL) 10 MG tablet; Take 1/2 tablet by mouth once daily    Other hyperlipidemia  -     atorvastatin (LIPITOR) 20 MG tablet; TAKE 1 TABLET BY MOUTH AT BEDTIME    Idiopathic gout, unspecified chronicity, unspecified site  -     allopurinol (ZYLOPRIM) 100 MG tablet; TAKE 1 TABLET BY MOUTH ONCE DAILY    Chronic obstructive pulmonary disease, unspecified COPD type (HCC)  -     albuterol sulfate HFA (PROVENTIL;VENTOLIN;PROAIR) 108 (90 Base) MCG/ACT inhaler;  Inhale 2 puffs into the lungs every 6 hours as needed for Wheezing or Shortness of Breath    SRINATH (acute kidney injury) ([facility]    Hyponatremia      -  Chronic medical problems stable  -  Continue current medications  -  Follow up with specialists as scheduled  -  [facility]    Diagnostic test results reviewed: inpatient labs, EKG, chest x-ray, CT-head, and MRI-brain    Patient risk of morbidity and mortality: high    Medical Decision Making: high complexity

## 2023-01-06 ENCOUNTER — CARE COORDINATION (OUTPATIENT)
Dept: CASE MANAGEMENT | Age: 68
End: 2023-01-06

## 2023-01-06 NOTE — CARE COORDINATION
[facility] Care Transitions Follow Up Call    Care Transition Nurse attempted subsequent CT outreach leaving Jono TANNER w/ my contact info. Patient: Lenka Gordon  Patient : 1955   MRN: <[id number]>  Reason for Admission: 2023 - 2023 [address]. New onset seizures. Discharge Date: 23 RARS: No data recorded    Attended PCP appt .   Leopold/neuro  11:15    Laura Bennett RN

## 2023-01-10 ENCOUNTER — CARE COORDINATION (OUTPATIENT)
Dept: CASE MANAGEMENT | Age: 68
End: 2023-01-10

## 2023-01-10 NOTE — CARE COORDINATION
[facility] Care Transitions Follow Up Call    Care Transition Nurse contacted the patient by telephone to follow up after admission on 23. Verified name and  with patient as identifiers. Patient: Kylah Horn  Patient : 1955   MRN: [id number]  Reason for Admission: seizure  Discharge Date: 23 RARS: No data recorded    Needs to be reviewed by the provider   Additional needs identified to be addressed with provider: No  none             Method of communication with provider: none. CTN call to Sarita Diaz today and he says he is feeling just fine. Denies seizure activity, dizziness, lightheadedness, sob, chest pain, n/v/d, visual disturbances, AMS. PCP HFU 23-> reduced Lisinopril to 1/2 tab daily  WD=887/70  Neuro HFU 23-has . He expresses frustration on not being able to drive until Neuro appt. But knows he will have to wait until then. Eating, drinking & sleeping well. Denies problems w/ urination/bowels. C/o sub par care when in the hospital, this writer offered patient relations #, he politely declined saying it won't do any good. No other concerns voiced at this time. Addressed changes since last contact:  none  Discussed follow-up appointments. If no appointment was previously scheduled, appointment scheduling offered: Yes. Is follow up appointment scheduled within 7 days of discharge? Yes.     Follow Up  Future Appointments   Date Time Provider Jourdan Garcia   2023 11:15 AM DEMI Maldonado CNP [facility]rology -   3/9/2023  8:00 AM STR CT IMAGING RM1  OP [facility] STR[facility] OUT EXP STR Radiolog   3/14/2023  8:00 AM DEMI Griffin CNP Pulm Med [facility] - [place]   2023  8:15 AM Bo Deshpande, DO [address]     Non-[facility] follow up appointment(s):     Care Transition Nurse reviewed discharge instructions, medical action plan, and red flags with patient and discussed any barriers to care and/or understanding of plan of care after discharge. Discussed appropriate site of care based on symptoms and resources available to patient including: PCP  Specialist  When to call 911. The patient agrees to contact the PCP office for questions related to their healthcare.     Advance Care Planning:   not on file.     Patients top risk factors for readmission: lack of knowledge about disease and medical condition-new onset seizures, SRINATH, COPD, HTN, HLD  Interventions to address risk factors: Scheduled appointment with Specialist-2/14/23 Neuro    Offered patient enrollment in the Remote Patient Monitoring (RPM) program for in-home monitoring: Patient is not eligible for RPM program.     Care Transitions Subsequent and Final Call    Schedule Follow Up Appointment with PCP: Completed  Subsequent and Final Calls  Do you have any ongoing symptoms?: Yes  Onset of Patient-reported symptoms: Other  Interventions for patient-reported symptoms: Notified PCP/Physician  Have your medications changed?: Yes  Patient Reports: BP med decreased d/t low BP  Do you have any questions related to your medications?: No  Do you currently have any active services?: No  Do you have any needs or concerns that I can assist you with?: No  Identified Barriers: Lack of Education  Care Transitions Interventions     Transportation Support: Declined      DME Assistance: Declined   Disease Specific Clinic: Completed      Disease Association: Completed      Other Interventions:             Care Transition Nurse provided contact information for future needs. Plan for follow-up call in 7-10 days based on severity of symptoms and risk factors.  Plan for next call: symptom management-seizures, BP, headaches    Paige Cates RN     no

## 2023-01-16 ENCOUNTER — TELEPHONE (OUTPATIENT)
Dept: FAMILY MEDICINE CLINIC | Age: 68
End: 2023-01-16

## 2023-01-16 ENCOUNTER — CARE COORDINATION (OUTPATIENT)
Dept: CASE MANAGEMENT | Age: 68
End: 2023-01-16

## 2023-01-16 NOTE — TELEPHONE ENCOUNTER
Patient notified and voiced understanding. He will call the office back Friday 1/20/23 with home BP readings.

## 2023-01-16 NOTE — TELEPHONE ENCOUNTER
Patient, his BP medication was decreased to Lisinopril 10 mg from 20 mg. His BP is still low at 108-110-68. He forgot to take it one night his am BP was 120/72. Patient asking if you want him to DC the BP medication all together. Please advise for a call back to patient.

## 2023-01-16 NOTE — CARE COORDINATION
[facility] Care Transitions Follow Up Call    Care Transition Nurse attempted subsequent CT outreach leaving Eliujesus VM to primary/secondary numbers w/ my contact info. Patient: Pamela Gaster  Patient : 1955   MRN: <[id number]>  Reason for Admission: 2023 - 2023 [address]. New onset seizures. Discharge Date: 23 RARS: No data recorded    Attended PCP appt .   Leopold/neuro  11:15    Andreia Tomlinson RN

## 2023-01-19 ENCOUNTER — CARE COORDINATION (OUTPATIENT)
Dept: CASE MANAGEMENT | Age: 68
End: 2023-01-19

## 2023-01-19 NOTE — CARE COORDINATION
[facility] Care Transitions Follow Up Call    Care Transition Nurse contacted the patient by telephone to follow up after admission. Verified name and  with patient as identifiers. Patient: Miranda Caballero  Patient : 1955   MRN: <[id number]>  Reason for Admission: Seizure   Discharge Date: 23 RARS: No data recorded    Needs to be reviewed by the provider   Additional needs identified to be addressed with provider: No  none             Method of communication with provider: none. Reports that he called his PCP office to report low BP's on Monday, 23 and his doctor instructed him to stop lisinopril. Eric Mace believes that the seizure medication is dropping his BP. He takes his BP 2 times daily and will call the office with those reading tomorrow. States that he is running around 105/68 on average. Denies seizure activity, head aches. Is \"bored\" and eating, \"too much\". Is frustrated because he cannot drive and is awaiting the Neurology appointment 23. Addressed changes since last contact:   see note  Discussed follow-up appointments. If no appointment was previously scheduled, appointment scheduling offered: prior call. Is follow up appointment scheduled within 7 days of discharge? Yes. Follow Up  Future Appointments   Date Time Provider Jourdan Garcia   2023 11:15 AM DEMI Sprague CNP [facility] -   3/9/2023  8:00 AM STR CT IMAGING RM1  OP [facility] STRZ OUT EXP STR Radiolog   3/14/2023  8:00 AM DEMI Lockett CNP Pulm Med P - [place]   2023  8:15 AM Nickie Rice DO [address] Transition Nurse reviewed medical action plan and red flags with patient and discussed any barriers to care and/or understanding of plan of care after discharge.  Discussed appropriate site of care based on symptoms and resources available to patient including: PCP  Specialist. The patient agrees to contact the PCP office for questions related to their healthcare. Advance Care Planning:   not on file. Patients top risk factors for readmission: medical condition-seizure, COPD  Interventions to address risk factors:  nurse assessment    Offered patient enrollment in the Remote Patient Monitoring (RPM) program for in-home monitoring: Patient is not eligible for RPM program.    Care Transition Nurse provided contact information for future needs. Plan for follow-up call in 5-7 days based on severity of symptoms and risk factors.   Plan for next call:  nurse Talia Huerta RN

## 2023-01-20 NOTE — TELEPHONE ENCOUNTER
Home BP readings:       1/16/23 am: 110/68  Pm: 101/60  1/17/23  am:127/77  Pm :111/67  1/18/23 am :107/68  Pm: 115/71  1/19/23 am 106/65  Pm: 107/66  1/20/23 am:112/71    Please review and advise for a call back to patient.

## 2023-01-25 ENCOUNTER — CARE COORDINATION (OUTPATIENT)
Dept: CASE MANAGEMENT | Age: 68
End: 2023-01-25

## 2023-01-25 NOTE — CARE COORDINATION
[facility] Care Transitions Follow Up Call    Care Transition Nurse contacted the patient by telephone to follow up after admission on 23. Verified name and  with patient as identifiers. Patient: Meseret Gallardo  Patient : 1955   MRN: [id number]  Reason for Admission: new onset seizures  Discharge Date: 23 RARS: No data recorded    Needs to be reviewed by the provider   Additional needs identified to be addressed with provider: No  none             Method of communication with provider: none. CTN call to Matthias today and he says he is doing just fine. Waiting patiently for Neuro HFU  23 so he can drive. Denies seizure activity, visual disturbances, headaches, facial weakness, slurred speech, auras, sob, chest pain, dizziness, lightheadedness. JO=718/70 after stopping Lisinopril. Eating, drinking 7 sleeping ok. Says he sleeps a lot after taking the [address]. Denies problems w urination/bowels. Expressed frustration over billing from [facility]. Has billing office # to call. Will follow 1 more week. Addressed changes since last contact:  medications-stopped Lisinopril per PCP  Discussed follow-up appointments. If no appointment was previously scheduled, appointment scheduling offered: Yes. Is follow up appointment scheduled within 7 days of discharge? Yes. Follow Up  Future Appointments   Date Time Provider Jourdan Garcia   2023 11:15 AM DEMI Resendiz CNP [facility] -   3/9/2023  8:00 AM STR CT IMAGING RM1  OP [facility] STRZ OUT EXP STR Radiolog   3/14/2023  8:00 AM DEMI Coats CNP Pulm Med [facility] - [place]   2023  8:15 AM Castillo Blackburn DO [facility] follow up appointment(s):     Care Transition Nurse reviewed discharge instructions, medical action plan, and red flags with patient and discussed any barriers to care and/or understanding of plan of care after discharge.  Discussed appropriate site of care based on symptoms and resources available to patient including: PCP  Specialist  Urgent care clinics  When to call 911. The patient agrees to contact the PCP office for questions related to their healthcare. Advance Care Planning:   not on file. Patients top risk factors for readmission: lack of knowledge about disease and medical condition-new onset seizures, SRINATH, COPD, HTN, HLD  Interventions to address risk factors: Scheduled appointment with Specialist-2/14/23 Neuro    Offered patient enrollment in the Remote Patient Monitoring (RPM) program for in-home monitoring: Patient is not eligible for RPM program.     Care Transitions Subsequent and Final Call    Schedule Follow Up Appointment with PCP: Completed  Subsequent and Final Calls  Do you have any ongoing symptoms?: No  Have your medications changed?: No  Do you have any questions related to your medications?: No  Do you currently have any active services?: No  Do you have any needs or concerns that I can assist you with?: No  Identified Barriers: Lack of Education  Care Transitions Interventions     Transportation Support: Declined      DME Assistance: Declined   Disease Specific Clinic: Completed      Disease Association: Completed      Other Interventions:             Care Transition Nurse provided contact information for future needs. Plan for follow-up call in 7-10 days based on severity of symptoms and risk factors.   Plan for next call: symptom management-seizures, neuro checks, BP    Elvin MATTEO Lara

## 2023-01-30 ENCOUNTER — CARE COORDINATION (OUTPATIENT)
Dept: CASE MANAGEMENT | Age: 68
End: 2023-01-30

## 2023-01-30 NOTE — CARE COORDINATION
[facility] Care Transitions Follow Up Call:Final Call    Care Transition Nurse contacted the patient by telephone to follow up after admission on 23. Verified name and  with patient as identifiers. Patient: Aisha Scales  Patient : 1955   MRN: [id number]  Reason for Admission: seizures  Discharge Date: 23 RARS: No data recorded    Needs to be reviewed by the provider   Additional needs identified to be addressed with provider: No  none             Method of communication with provider: none. CTN call to Matthias today and he says nothing has changed since last week. Denies seizure activity, aura, visual disturbances, sob, weakness, fever, chills, dizziness. Eating, drinking & sleeping ok. Occasional h/a but he had this before the seizure. OQ=177/70  Neuro HFU 23 still not driving, going into water, heights. No other concerns voiced at this time. CTN informed him of final call. He expressed appreciation for the care. Addressed changes since last contact:  none  Discussed follow-up appointments. If no appointment was previously scheduled, appointment scheduling offered: Yes. Is follow up appointment scheduled within 7 days of discharge? Yes. Follow Up  Future Appointments   Date Time Provider Jourdan Garcia   2023 11:15 AM DEMI Magana CNP [facility] -   3/9/2023  8:00 AM STR CT IMAGING RM1  OP [facility] STRZ OUT EXP STR Radiolog   3/14/2023  8:00 AM DEMI Baldwin CNP Pul Med [facility] [place]   2023  8:15 AM Es Mendoza DO [address] [facility] follow up appointment(s):     Care Transition Nurse reviewed discharge instructions, medical action plan, and red flags with patient and discussed any barriers to care and/or understanding of plan of care after discharge. Discussed appropriate site of care based on symptoms and resources available to patient including: PCP  Specialist  When to call 911.  The patient agrees to contact the PCP office for questions related to their healthcare. Advance Care Planning:   not on file. Patients top risk factors for readmission: lack of knowledge about disease and medical condition-seizure activity, HTN COPD, HLD  Interventions to address risk factors: Scheduled appointment with Specialist-2/14/23    Offered patient enrollment in the Remote Patient Monitoring (RPM) program for in-home monitoring: Patient is not eligible for RPM program.     Care Transitions Subsequent and Final Call    Schedule Follow Up Appointment with PCP: Completed  Subsequent and Final Calls  Do you have any ongoing symptoms?: No  Have your medications changed?: No  Do you have any questions related to your medications?: No  Do you currently have any active services?: No  Do you have any needs or concerns that I can assist you with?: No  Identified Barriers: Lack of Education  Care Transitions Interventions     Transportation Support: Declined      DME Assistance: Declined   Disease Specific Clinic: Completed      Disease Association: Completed      Other Interventions:             Care Transition Nurse provided contact information for future needs. No further follow-up call indicated based on severity of symptoms and risk factors.   Plan for next call:  none    Roxi Hays RN

## 2023-02-14 ENCOUNTER — OFFICE VISIT (OUTPATIENT)
Dept: NEUROLOGY | Age: 68
End: 2023-02-14
Payer: MEDICARE

## 2023-02-14 VITALS
WEIGHT: 206 LBS | DIASTOLIC BLOOD PRESSURE: 65 MMHG | OXYGEN SATURATION: 99 % | BODY MASS INDEX: 31.22 KG/M2 | HEIGHT: 68 IN | HEART RATE: 65 BPM | SYSTOLIC BLOOD PRESSURE: 110 MMHG

## 2023-02-14 DIAGNOSIS — R56.9 SINGLE SEIZURE (HCC): Primary | ICD-10-CM

## 2023-02-14 DIAGNOSIS — R55 SYNCOPE AND COLLAPSE: ICD-10-CM

## 2023-02-14 PROCEDURE — 99205 OFFICE O/P NEW HI 60 MIN: CPT | Performed by: PSYCHIATRY & NEUROLOGY

## 2023-02-14 PROCEDURE — 3074F SYST BP LT 130 MM HG: CPT | Performed by: PSYCHIATRY & NEUROLOGY

## 2023-02-14 PROCEDURE — 1123F ACP DISCUSS/DSCN MKR DOCD: CPT | Performed by: PSYCHIATRY & NEUROLOGY

## 2023-02-14 PROCEDURE — 3078F DIAST BP <80 MM HG: CPT | Performed by: PSYCHIATRY & NEUROLOGY

## 2023-02-14 NOTE — PATIENT INSTRUCTIONS
Tilt table test  30 day cardiac event monitor  EKG  Continue with Keppra at current dose  Keppra level  Continue avoiding alcohol  No driving, swimming, operating heavy machinery or compromising heights until event free for 6 months Report any new events. Call if any questions. Call with any new symptoms or concerns. Follow up in 7 weeks.

## 2023-02-16 ENCOUNTER — TELEPHONE (OUTPATIENT)
Dept: FAMILY MEDICINE CLINIC | Age: 68
End: 2023-02-16

## 2023-02-16 NOTE — TELEPHONE ENCOUNTER
Pt had recent Neurology visit, he states they have ordered some various cardiology related testing. He is unsure about the testing, feels it should be handled through a cardiologist rather than the neurologist. He would like Dr Christiano Moctezuma to review his neurology notes and let him know if it's a good next step. He is open to be referred to a different neurologist and/or seeing a cardiologist. Please advise.

## 2023-02-20 LAB — KEPPRA: 26.5 MCG/ML (ref 12–46)

## 2023-02-28 NOTE — PROGRESS NOTES
Chief Complaint   Patient presents with    Consultation     Seizure    Hamzah Willoughby is a 74 y.o. male who presents today for evaluation of episode of seizure like activity that started in December 2022. He had just gotten up from supper to take his plate to the sink when he suddenly passed out, had convulsion. The convulsion ceased and he regained consciousness and he walked to a chair. He was sitting when he suddenly passed out again. No tongue biting. He did have bladder incontinence. No confusion afterwards. He was started on Keppra 750 mg two times a day. He reports side effects to the keppra of chest pain and muscle pain. No previous history of seizure in the past. No family history of seizure. He does have previous history of lung cancer. MRI brain W/WO contrast done 1/2/23 showed No evidence of metastatic disease in the brain. Interval development of mild global volume loss. Mild severity chronic small vessel ischemic changes which has worsened compared to the prior exam. His sleep is good, he wakes up feeling well rested. He does occasionally snore. He does take daytime naps. EEG done 1/2/23 was normal. He does consume at least 3 beers daily. He is not currently driving. No history of head injury. He denies chest pain. No shortness of breath, no neck pain. No vision changes. No dysphagia. No fever. No rash. No weight loss. History provided by patient  accompanied by his wife.         Past Medical History:   Diagnosis Date    Cancer ([facility] [address]     LUNG    COPD (chronic obstructive pulmonary disease) (HCC)     GERD (gastroesophageal reflux disease)     Hypertension     Lung mass     RIGHT UPPER LOBE    Seizures ([facility] [address] 01/01/2023       Patient Active Problem List   Diagnosis    HTN (hypertension)    IFG (impaired fasting glucose)    Stage 2 moderate COPD by GOLD classification ([address]    Low HDL (under 40)    Situational anxiety    Open wound of back, complicated    Seizure ([address]       Allergies   Allergen Reactions    Adhesive Tape        Current Outpatient Medications   Medication Sig Dispense Refill    atorvastatin (LIPITOR) 20 MG tablet TAKE 1 TABLET BY MOUTH AT BEDTIME 90 tablet 3    allopurinol (ZYLOPRIM) 100 MG tablet TAKE 1 TABLET BY MOUTH ONCE DAILY 90 tablet 3    albuterol sulfate HFA (PROVENTIL;VENTOLIN;PROAIR) 108 (90 Base) MCG/ACT inhaler Inhale 2 puffs into the lungs every 6 hours as needed for Wheezing or Shortness of Breath 1 each 5    levETIRAcetam (KEPPRA) 750 MG tablet Take 1 tablet by mouth in the morning and 1 tablet in the evening. 60 tablet 3    SPIRIVA RESPIMAT 2.5 MCG/ACT AERS inhaler INHALE 2 PUFFS BY MOUTH ONCE DAILY 1 each 11    aspirin 81 MG tablet Take 81 mg by mouth daily. omeprazole (PRILOSEC OTC) 20 MG tablet Take 20 mg by mouth daily. No current facility-administered medications for this visit.        Social History     Socioeconomic History    Marital status:      Spouse name: Lisseth Lopez    Number of children: 1    Years of education: None    Highest education level: None   Tobacco Use    Smoking status: Former     Packs/day: 0.00     Years: 41.00     Pack years: 0.00     Types: Cigarettes     Start date: 1973     Quit date: 3/24/2016     Years since quittin.8    Smokeless tobacco: Never   Vaping Use    Vaping Use: Never used   Substance and Sexual Activity    Alcohol use: Yes     Comment: occasionally on weekends while camping    Drug use: No    Sexual activity: Yes     Partners: Female     Social Determinants of Health     Financial Resource Strain: Low Risk     Difficulty of Paying Living Expenses: Not hard at all   Food Insecurity: No Food Insecurity    Worried About Running Out of Food in the Last Year: Never true    Ran Out of Food in the Last Year: Never true       Family History   Problem Relation Age of Onset    Lung Cancer Mother 61    Heart Disease Father 61    Diabetes Father     COPD Sister     Heart Disease Brother          I reviewed the past medical history, allergies, medications, social history and family history. Review of Systems   All systems reviewed, and are all negative, except what is mentioned in HPI      Vitals:    02/14/23 1117   BP: 110/65   Site: Left Upper Arm   Position: Sitting   Cuff Size: Large Adult   Pulse: 65   SpO2: 99%   Weight: 206 lb (93.4 kg)   Height: 5' 8\" (1.727 m)       Physical Examination:  General appearance - alert, well appearing, and in no distress, oriented to person, place, and time and over weight  Mental status- Level of Alertness: awake  Orientation: person, place, time  Memory: normal  Fund of Knowledge: normal  Attention/Concentration: normal  Language: normal. Mood is normal.   Neck - supple, no significant adenopathy, carotids upstroke normal bilaterally. There is no axillary lymphadenopathy. There is no carotid bruit. No neck lymphadenopathy. No thyroid enlargement  Neurological -   Cranial [id number]:.   Cranial nerve II: Normal   Cranial nerve III: Pupils: equal, round, reactive to light  Cranial nerves III, IV, VI: Extraocular Movements: intact   Cranial nerve V: Facial sensation: intact   Cranial nerve VII:Facial strength: intact   Cranial nerve VIII: Hearing: intact    Cranial nerve IX: Palate Elevation intact bilaterally  Cranial nerve XI: Shoulder shrug intact bilaterally  Cranial nerve XII: Tongue midline   neck supple without rigidity, there is no limitation of range of motion of the neck. DTR's are Intact distal and symmetric  Babinski sign negative   Motor exam is 5/5 in the upper and lower extremities  Normal muscle tone. There is no muscle atrophy.   Sensory is intact for light touch  Coordination: finger to nose, intact  Gait and station intact    Abnormal movement none, vibration normal, proprioception normal  Skin - warm, dry to touch, normal coloration, no rashes, no suspicious skin lesions  Superficial temporal artery pulses are normal.   There is no resting tremor, no pin rolling, no bradykinesia, no Hypohonia, normal blink rate. Musculoskeletal: Has no hand arthritis, no limitation of ROM in any of the four extremities. There is no leg edema . The Heart was regular in rate and rhythm. No heart murmur  Chest Clear, with  good effort. Abdomen soft, intact bowel sounds. MRI Brain WO Contrast (reviewed)    Narrative  PROCEDURE: MRI BRAIN WO CONTRAST    CLINICAL INFORMATION: Newly diagnosed lung cancer. Staging. COMPARISON: No prior brain imaging for comparison. TECHNIQUE: Using a 1.5 Debbie Siemens scanner, axial diffusion weighted echoplanar imaging, T2-weighted turbo spin-echo, fat-saturated T2-weighted fluid attenuated inversion recovery, and proton density weighted gradient recall images were obtained of the  entire brain. Sagittal 3-D T1 weighted volume acquisition gradient recall images were also obtained, with multiplanar reconstructions. No IV contrast administered. BLADE software was used. FINDINGS: The global brain volume is within normal limits. There is no evidence of parenchymal edema. There is no diffusion abnormality. To the limits of a noncontrast study, there are no findings to indicate metastatic disease. No convincing blood products identified in the brain parenchyma. Very minimal amounts of hyperintense T2/hypointense T1 foci in the white matter of both cerebral hemispheres are characteristic of minimal small vessel disease sequelae, and of doubtful clinical significance. No convincing cortical signal abnormality of  the cerebral hemispheres. Ventricles and the extra-axial spaces are satisfactory. Vascular structures are grossly satisfactory. There is circumferential mucosal thickening of the right maxillary sinus without air-fluid level. Minimal mucosal thickening of ethmoid air cells. Deviated nasal septum to the left of midline also incidentally demonstrated. Mastoid air cells and middle  ear cavities are clear.   Limited detail of the cervical canal and spinal cord and sella also are grossly unremarkable. Impression  NO EVIDENCE OF ACUTE ABNORMALITY OR FINDINGS THAT STRONGLY SUGGEST METASTATIC DISEASE, TO THE LIMITS OF NONCONTRAST IMAGING. MILD SMALL VESSEL DISEASE SEQUELAE. MILD PARANASAL SINUS DISEASE ALSO. **This report has been created using voice recognition software. It may contain minor errors which are inherent in voice recognition technology. **  Final report electronically signed by Dr. Araceli Sánchez on 3/13/2016 1:11 PM      [facility]    Narrative  PROCEDURE: [facility] INFORMATIONfirst time seizure. History of lung cancer. COMPARISON: Brain MRI 3/12/2016. TECHNIQUE: Multiplanar and multiple spin echo T1 and T2-weighted images were obtained through the brain before and after the administration of intravenous contrast.  FINDINGS:  The diffusion-weighted images are normal. The brain volume is mildly reduced. This has developed since the prior examination. .There are no intra-or extra-axial collections. There is no hydrocephalus, midline shift or mass effect. On the FLAIR and T2-weighted sequences, there is mild signal hyperintensity scattered in the white matter of the brain. This is most consistent with mild severity chronic small vessel ischemic changes. This has progressed compared to the prior  examination. On the gradient echo T2-weighted images, there is mineralization in the medial aspects of the basal ganglia. There is no abnormal enhancement in the brain. The major intracranial vascular flow voids are present. The midline craniocervical junction structures are normal.  The brainstem and pituitary gland are normal.  There is persistent mucosal thickening in the right maxillary sinus. Impression  1. No evidence of metastatic disease in the brain. 2. Interval development of mild global volume loss.   3. Mild severity chronic small vessel ischemic changes which has worsened compared to the prior exam.  **This report has been created using voice recognition software. It may contain minor errors which are inherent in voice recognition technology. **  Final report electronically signed by Dr. Vahid Posada on 1/2/2023 9:34 AM      CT Head W/O Contrast    Narrative  PROCEDURE: NONCONTRAST CT BRAIN    CLINICAL INFORMATION: seizure    COMPARISON: No prior study    TECHNIQUE: Multiple axial 5 mm images of the brain were obtained without administration of intravenous contrast material. ALL CT SCANS AT THIS FACILITY use dose modulation, iterative reconstruction, and/or weight-based dosing when appropriate to reduce  radiation dose to as low as reasonably achievable. FINDINGS:    VENTRICLES: Normal in size, contour, position. .. PARENCHYMA:  No acute infarction, mass lesion, or intracranial hemorrhage is seen. MASTOID PROCESSES: Well aerated. Normal in appearance. [place] PARANASAL SINUSES/CALVARIUM: There are a few retention cysts in the right maxillary sinus. Rebecca bullosa involving the right middle nasal turbinate. No skull fracture. Impression  Unremarkable noncontrast CT brain. No acute intracranial process. .      **This report has been created using voice recognition software. It may contain minor errors which are inherent in voice recognition technology. **    Final report electronically signed by Dr. Leandra Osman on 1/1/2023 7:54 PM      EEG done 1/2/23:  Impression  Normal awake and sleep EEG. EKG lead did not show clear arrhythmia, if still in concern consider formal EKG or correlation with telemetry. No epileptiform discharges were identified. Please note the absence of such activity on this record cannot conclusively rule out an epileptic disorder. If such is still clinically suspected, a repeat study with sleep deprivation and/or prolonged sampling may be helpful. Kelli Cole MD  Epilepsy Board Certified. Neurology Board Certified.     Electronically Signed We reviewed the patient records from referring provider and available information in the EHR       ASSESSMENT:      Diagnosis Orders   1. Single seizure [facility])           This is a 72-year-old male with episode of single seizure that occurred in December 2022. He had finished supper to take his plate to the sink when he suddenly passed out, had convulsion. The convulsion ceased and he regained consciousness and he walked to a chair. He was sitting when he suddenly passed out again. No tongue biting. He did have bladder incontinence. No confusion afterwards. I reviewed the MRI Brain and agree with interpretation, I also reviewed the patient pertinent labs and records in the EHR and from other providers. He was consuming alcohol daily at the time. MRI brain done with without contrast 1/2/2023 showed no acute findings. EEG done 1/2/23 was normal. He was started on Keppra 750 mg 2 times a day. The patient was counseled about his symptoms and work up recommended, he was also counseled about medication options and side effects. He we will need to undergo work-up for other causes of seizure-like activity including tilt table test and 30-day cardiac event monitor. We will obtain Keppra level to ensure compliance. He was counseled on the importance of avoiding alcohol as well as refraining from driving, swimming, operating heavy machinery, or compromising heights until event free for 6 months. After detailed discussion with patient we agreed on the following plan. Plan    Tilt table test  30 day cardiac event monitor  EKG  Continue with Keppra at current dose  Keppra level  Continue avoiding alcohol  No driving, swimming, operating heavy machinery or compromising heights until event free for 6 months Report any new events. Call if any questions. Call with any new symptoms or concerns. Follow up in 7 weeks.      Total time 64 min    Kacie Clinton MD

## 2023-03-07 ENCOUNTER — HOSPITAL ENCOUNTER (OUTPATIENT)
Dept: NON INVASIVE DIAGNOSTICS | Age: 68
Discharge: HOME | End: 2023-03-07
Payer: MEDICARE

## 2023-03-07 VITALS — BODY MASS INDEX: 30.21 KG/M2 | WEIGHT: 204 LBS | HEIGHT: 69 IN

## 2023-03-07 DIAGNOSIS — R56.9 SINGLE SEIZURE (HCC): ICD-10-CM

## 2023-03-07 DIAGNOSIS — R55 SYNCOPE AND COLLAPSE: ICD-10-CM

## 2023-03-07 PROCEDURE — 93270 REMOTE 30 DAY ECG REV/REPORT: CPT

## 2023-03-07 PROCEDURE — 93017 CV STRESS TEST TRACING ONLY: CPT

## 2023-03-07 PROCEDURE — 93660 TILT TABLE EVALUATION: CPT | Performed by: INTERNAL MEDICINE

## 2023-03-07 PROCEDURE — 93005 ELECTROCARDIOGRAM TRACING: CPT | Performed by: NURSE PRACTITIONER

## 2023-03-07 NOTE — PROCEDURES
30 Day Cardiac Event Monitor was applied to patient. Instructions were given and skin/monitor prep and application was demonstrated. Patient was instructed to remove monitor on 4/6 and mail back to [employer].

## 2023-03-08 LAB
EKG ATRIAL RATE: 62 BPM
EKG P AXIS: 85 DEGREES
EKG P-R INTERVAL: 150 MS
EKG Q-T INTERVAL: 414 MS
EKG QRS DURATION: 88 MS
EKG QTC CALCULATION (BAZETT): 420 MS
EKG R AXIS: 48 DEGREES
EKG T AXIS: 49 DEGREES
EKG VENTRICULAR RATE: 62 BPM

## 2023-03-08 PROCEDURE — 93010 ELECTROCARDIOGRAM REPORT: CPT | Performed by: INTERNAL MEDICINE

## 2023-03-08 NOTE — PROCEDURES
[address] [id number]                                TILT TABLE TEST    PATIENT NAME: Shazia Remy                   :        1955  MED REC NO:   [id number]                           ROOM:  ACCOUNT NO:   [de-identified]                           ADMIT DATE: 2023  PROVIDER:     Daniel Whitten MD    DATE OF STUDY:  2023    TEST TYPE:  Tilt table examination. INDICATION:  Seizure-like activity. DESCRIPTION OF THE PROCEDURE:  After informed consent was obtained from  the patient, she was brought to the stress laboratory in a fasting  nonsedated state. The patient was hooked up to continuous blood  pressure, ECG, pulse oximetry and IV access. The patient was laid  supine. Blood pressure upon laying the patient supine was 116/74 with  pulse of 62. The patient was then tilted to 70 degrees and observed for  30 minutes. Immediately upon tilting, the patient's blood pressure was  113/78 with pulse of 62. At 10 minutes into the tilt, the patient's  blood pressure was 135/77, pulse 80. He started feeling numb  bilaterally at that time. At 20 minutes into the tilt, the patient's  blood pressure was 108/80 with pulse of 85. At 25 minutes into the  tilt, the patient's blood pressure was 112/85 with pulse of 86. At 30  minutes into the tilt, the patient's blood pressure was 118/76 with  pulse of 82. Then the patient was laid supine. Immediately upon  recovery, the patient's blood pressure was 111/70 with pulse of 68. Five minutes into the recovery, the patient's blood pressure was 118/69  with a pulse of 65. SYMPTOMS:  Other than the feeling of bilateral numbness, there was an  episode of lightheadedness where he had some lightheadedness  intermittently throughout the entire testing, but was not able to say  exactly when he felt that symptom.     ECG:  Normal sinus rhythm without any high-grade AV block throughout the  entire examination, no findings of high-grade AV block, pause of greater  than 3 seconds or elongation, nonsustained VT or VT. There was  infrequent PVCs. No atrial fibrillation, flutter, SVT were noted. SUMMARY:  Tilt table examination does demonstrate some degree of  orthostasis that may be symptomatic, but this was not persistent  throughout the entire tilt table examination unlikely to cause the  patient's complaint. PLAN:  Follow up with ordering provider for further management.       Zenobia Toledo MD    D: 03/07/2023 14:25:06       T: 03/07/2023 23:15:39     [email or web address]  Job#: [id number]     Doc#: [id number]    CC:

## 2023-03-09 ENCOUNTER — HOSPITAL ENCOUNTER (OUTPATIENT)
Dept: CT IMAGING | Age: 68
Discharge: HOME | End: 2023-03-09
Payer: MEDICARE

## 2023-03-09 ENCOUNTER — TELEPHONE (OUTPATIENT)
Dept: NEUROLOGY | Age: 68
End: 2023-03-09

## 2023-03-09 ENCOUNTER — TELEPHONE (OUTPATIENT)
Dept: CARDIOLOGY CLINIC | Age: 68
End: 2023-03-09

## 2023-03-09 DIAGNOSIS — Z87.891 PERSONAL HISTORY OF TOBACCO USE: ICD-10-CM

## 2023-03-09 DIAGNOSIS — R94.31 ABNORMAL EKG: Primary | ICD-10-CM

## 2023-03-09 DIAGNOSIS — R56.9 SINGLE SEIZURE (HCC): ICD-10-CM

## 2023-03-09 DIAGNOSIS — R55 SYNCOPE AND COLLAPSE: ICD-10-CM

## 2023-03-09 PROCEDURE — 71271 CT THORAX LUNG CANCER SCR C-: CPT

## 2023-03-09 NOTE — PROGRESS NOTES
[facility]    Patient: Gilford Barban, 74 y.o.   : 1955  3/14/2023    Patient of Dr. Bronwyn Phoenix   Patient presents with    COPD     5mo COPD f/u w/CT Chest completed on 3/9/23 at [facility]. Doing well until he had a couple seizures on New Year's day. Is now following with cardiology for this. Does have a heart monitor on and will show in his xray. Is accompanied by his wife, Cee. KELVIN Aguayo is here for follow up for moderate COPD and centrilobular emphysema. Patient is here with his wife today. He reports that he has had 2 seizures since his last appointment and was hospitalized for this. Patient is here with CT lung screen that revealed 8 x 4 mm pleural-based nodule that appears slightly more prominent when compared to prior exam on 3/7/22. Overall patient reports respiratory symptoms have been stable since last appointment. He admits that he gets worn out easier since his hospitalization. Patient reports good compliance with inhaled medications (Spiriva). Patient using albuterol 2-3 times per month on average. Patient reports some physical limitation due to respiratory symptoms. His past medical history is significant for COPD, SCLCA status post RUL lobectomy in 2016 (follows with Dr. Ally Dickinson as needed), hypertension, and GERD. Complaints: shortness of breath   Onset Duration: over a year  Exacerbating factors: exertion  Alleviating factors: rest  Associated symptoms: \"some\" wheezing and \"some\" chest tightness  Pertinent negatives: hemoptysis and cough  Risk factors for lung disease: animal exposure and tobacco exposure    Progress History:   Since last visit any new medical issues? Headaches and Seizures - following with cardiology and neurology now  New ER or hospital visits? Yes admission to [facility] on 2023 for new onset seizures. CT head-no acute process. MRI-no mass.   Any new or changes in medicines? Yes started on Keppra-following with neurology  Using inhalers? Yes Spiriva and as needed albuterol   Are they helpful? Yes  Previous inhalers? Anoro - headaches  Any recent exacerbations? No  Last PFT: 10/4/22 - moderate COPD   Last 6 MWT: 10/6/2016  Last A1AT: MM     Smoking History:  41 pack year history, quit in 2016  His significant other smokes outdoors. Social History:  Patient job history: On disability, was a   He possibly has had exposure to aerosolized particles or hazardous fumes. (Coal, dust, asbestos, molds ie Hay)  Denies living on a farm that primarily raised crops, livestock or combination  Admits to exposure to pets/animals at home. 1 dog  Denies exposure to tuberculosis.      Flu vaccine: reports he received  Pneumonia vaccine: [id number] 11/15/2017 and reports he had a pneumonia vaccine this year as well at his pharmacy  COVID-19 vaccine: Vaccinated  Past Medical hx   PMH:  Past Medical History:   Diagnosis Date    Cancer ([facility] [address]     LUNG    COPD (chronic obstructive pulmonary disease) (HCC)     GERD (gastroesophageal reflux disease)     Hypertension     Lung mass     RIGHT UPPER LOBE    Seizures ([facility] [address] 2023     SURGICAL HISTORY:  Past Surgical History:   Procedure Laterality Date    COLONOSCOPY      FRACTURE SURGERY      collar bone    LOBECTOMY Right 3/24/16    upper    LUNG BIOPSY Right 3/3/16    Dr Rivera Pac HISTORY:  Social History     Tobacco Use    Smoking status: Former     Packs/day: 0.00     Years: 41.00     Pack years: 0.00     Types: Cigarettes     Start date: 1973     Quit date: 3/24/2016     Years since quittin.9    Smokeless tobacco: Never    Tobacco comments:     Quit smoking    Vaping Use    Vaping Use: Never used   Substance Use Topics    Alcohol use: Yes     Comment: occasionally on weekends while camping    Drug use: No     ALLERGIES:  Allergies   Allergen Reactions    Adhesive Tape      FAMILY HISTORY:  Family History   Problem Relation Age of Onset    Lung Cancer Mother 61    Heart Disease Father 61    Diabetes Father     COPD Sister     Heart Disease Brother      CURRENT MEDICATIONS:  Current Outpatient Medications   Medication Sig Dispense Refill    atorvastatin (LIPITOR) 20 MG tablet TAKE 1 TABLET BY MOUTH AT BEDTIME 90 tablet 3    allopurinol (ZYLOPRIM) 100 MG tablet TAKE 1 TABLET BY MOUTH ONCE DAILY 90 tablet 3    albuterol sulfate HFA (PROVENTIL;VENTOLIN;PROAIR) 108 (90 Base) MCG/ACT inhaler Inhale 2 puffs into the lungs every 6 hours as needed for Wheezing or Shortness of Breath 1 each 5    levETIRAcetam (KEPPRA) 750 MG tablet Take 1 tablet by mouth in the morning and 1 tablet in the evening. 60 tablet 3    SPIRIVA RESPIMAT 2.5 MCG/ACT AERS inhaler INHALE 2 PUFFS BY MOUTH ONCE DAILY 1 each 11    aspirin 81 MG tablet Take 81 mg by mouth daily. omeprazole (PRILOSEC OTC) 20 MG tablet Take 20 mg by mouth daily. No current facility-administered medications for this visit. Roman DKUE   Review of Systems   Constitutional:  Positive for fatigue. Negative for appetite change, fever and unexpected weight change. HENT:  Negative for congestion, postnasal drip, rhinorrhea, sinus pressure, sinus pain and sneezing. Respiratory:  Positive for chest tightness, shortness of breath and wheezing. Negative for cough. Denies hemoptysis   Cardiovascular:  Positive for chest pain. Negative for palpitations and leg swelling. Going to see Dr. Caroline Moore   Genitourinary:  Negative for difficulty urinating. Allergic/Immunologic: Negative for environmental allergies. Neurological:  Positive for headaches. Negative for seizures (none since hospitalization).       Physical exam   /70 (Site: Left Upper Arm, Position: Sitting, Cuff Size: Medium Adult)   Pulse 73   Temp 97.5 °F (36.4 °C) (Oral)   Ht 5' 8.5\" (1.74 m)   Wt 209 lb 12.8 oz (95.2 kg)   SpO2 96% Comment: r/a  BMI 31.44 kg/m²   Wt Readings from Last 3 Encounters:   03/14/23 209 lb 12.8 oz (95.2 kg)   03/07/23 204 lb (92.5 kg)   02/14/23 206 lb (93.4 kg)       Physical Exam  Constitutional:       General: He is not in acute distress.     Comments: BMI 31.4   HENT:      Head: Normocephalic and atraumatic.      Right Ear: External ear normal.      Left Ear: External ear normal.      Mouth/Throat:      Mouth: Mucous membranes are moist.      Pharynx: No oropharyngeal exudate or posterior oropharyngeal erythema.   Eyes:      General:         Right eye: No discharge.         Left eye: No discharge.   Cardiovascular:      Rate and Rhythm: Normal rate and regular rhythm.   Pulmonary:      Effort: Pulmonary effort is normal. No respiratory distress.      Breath sounds: No wheezing, rhonchi or rales.   Chest:      Chest wall: No tenderness.   Musculoskeletal:      Cervical back: Neck supple.   Skin:     General: Skin is warm and dry.   Neurological:      General: No focal deficit present.      Mental Status: He is alert.   Psychiatric:         Mood and Affect: Mood normal.         Behavior: Behavior normal.         Thought Content: Thought content normal.        Results   Lung Nodule Screening     [x] Qualifies    [] Does not qualify   [] Declined    [] Completed  41 pack year history, quit in 2016   The USPSTF recommends annual screening for lung cancer with low-dose computed tomography (LDCT) in adults aged 50 to 80 years who have a 20 pack-year smoking history and currently smoke or have quit within the past 15 years. Screening should be discontinued once a person has not smoked for 15 years or develops a health problem that substantially limits life expectancy or the ability or willingness to have curative lung surgery.     CT lung screen 3/9/2023  Narrative   NONCONTRAST SCREENING CT CHEST:       HISTORY: History of smoking. 41 pack year history of smoking.       TECHNIQUE:    1 mm axial imaging of the chest and upper abdomen without IV  contrast.    All CT scans at this facility use dose modulation, iterative reconstruction, and/or weight-based dosing when appropriate to reduce radiation dose to as low as reasonably achievable. All CT scans at this facility use dose modulation, iterative reconstruction, and/or weight based dosing when appropriate to reduce the radiation dose to as low as reasonably achievable. COMPARISON: 3/7/2022           FINDINGS:   LUNGS NODULES:   1. There is an 8 x 4 mm pleural-based nodule which appears slightly more prominent when compared to prior examination dated 3/7/2022. This is compatible with a lung RADS category 4A nodule. Recommend further evaluation with PET/CT. 2. There is a new 3 mm pulmonary nodule within the lateral right lower lobe on axial image 244. This is compatible with a lung RADS category 2 nodule. 3. There is a new 2 mm pulmonary nodule within the left upper lobe on axial image 58. This is compatible with a lung RADS category 2 nodule. LYMPHADENOPATHY:   1. There are no pathologically enlarged lymph nodes. OTHER (LUNGS/MEDIASTINUM/MUSCULOSKELETAL/ABDOMEN):   1. Postsurgical changes from right upper lobectomy are again seen. Impression   1. There is an 8 x 4 mm pleural-based nodule which appears slightly more prominent when compared to prior examination dated 3/7/2022. This is compatible with a lung RADS category 4A nodule. Recommend further evaluation with PET/CT. 2. Additional lung RADS category 2 nodules are otherwise as described above. 2. There are no pathologically enlarged lymph nodes. 3. LUNGRADS ASSESSMENT VALUE: 4A,           The LUNG RADS RECOMMENDATIONS for monitoring lung nodules listed below (ACR- Lung-RADS Version 1.0 Assessment Categories)       LUNG RADS RECOMMENDATIONS;   1.  Normal, continue annual screening   2.   Benign appearance or behavior, continue annual screening   3.  6 month CT recommended   4A.  3 month CT recommended; may consider PET/CT   4B. Additional diagnostics and/or tissue sampling recommended   4X. Additional diagnostics and/or tissue sampling recommended         **This report has been created using voice recognition software. It may contain minor errors which are inherent in voice recognition technology. **       Final report electronically signed by Dr. Mary Meza on 3/11/2023 7:21 AM             Assessment      Diagnosis Orders   1. Stage 2 moderate COPD by GOLD classification ([address]  PET CT SKULL BASE TO MID THIGH      2. Pulmonary emphysema, unspecified emphysema type ([address]  PET CT SKULL BASE TO MID THIGH      3. Personal history of tobacco use  PET CT SKULL BASE TO MID THIGH      4. Nodule of left lung  PET CT SKULL BASE TO MID THIGH      5. Squamous cell carcinoma of right lung (HCC)  PET CT SKULL BASE TO MID THIGH            Plan   1. Stage 2 moderate COPD by GOLD classification with Pulmonary emphysema, unspecified emphysema type   -Symptoms currently well controlled. Continue Spiriva  -Continue albuterol 2 puffs every 6 hours as needed for shortness of breath or wheezing   -Reviewed preventative vaccinations    2. Left lung nodule & personal history of tobacco use  -Reviewed lung screening with patient and his wife with increase in size of pleural-based nodule, currently measuring 8 x 4 mm in size. Discussed this with patient, will obtain PET scan as recommended by the radiologist.  Wynell Sham cancer prediction equation cancer probability 4 %     3. Squamous cell carcinoma of right lung history   -Patient completed 5 year surveillance and is now completing CT lung screenings for his smoking history  -Following with Dr. Cinda Finch as needed     Advised patient to call office with any changes, questions, or concerns regarding respiratory status or issues with prescribed medications    Return in about 1 week (around 3/21/2023) for lung nodules with PET scan prior.        Electronically signed by Stacie Moran, DEMI - CNP on 3/14/2023 at 8:40 AM     (Please note that portions of this note may have been completed with a voice recognition program. Efforts were made to edit the dictation but occasionally words are mis-transcribed)

## 2023-03-09 NOTE — TELEPHONE ENCOUNTER
----- Message from DEMI Dawkins CNP sent at 3/9/2023  8:00 AM EST -----  Please let patient know his EKG was abnormal, he needs to be seen by cardiology. Does he have a preference?   Marina Phelan CNP

## 2023-03-09 NOTE — TELEPHONE ENCOUNTER
Spoke with the patient regarding results. He would like to discuss with his family regarding a cardiologist and contact our office back.

## 2023-03-09 NOTE — TELEPHONE ENCOUNTER
Patient returned call to office. Patient would like referral to be placed for . Please advise.  Thank you

## 2023-03-09 NOTE — TELEPHONE ENCOUNTER
LM for patient to call office back.     Received referral in epic for   Abnormal EKG  - Primary       Syncope and collapse

## 2023-03-14 ENCOUNTER — OFFICE VISIT (OUTPATIENT)
Dept: PULMONOLOGY | Age: 68
End: 2023-03-14

## 2023-03-14 VITALS
DIASTOLIC BLOOD PRESSURE: 70 MMHG | TEMPERATURE: 97.5 F | HEART RATE: 73 BPM | OXYGEN SATURATION: 96 % | BODY MASS INDEX: 31.07 KG/M2 | SYSTOLIC BLOOD PRESSURE: 110 MMHG | HEIGHT: 69 IN | WEIGHT: 209.8 LBS

## 2023-03-14 DIAGNOSIS — J44.9 STAGE 2 MODERATE COPD BY GOLD CLASSIFICATION (HCC): Primary | ICD-10-CM

## 2023-03-14 DIAGNOSIS — R91.1 NODULE OF LEFT LUNG: ICD-10-CM

## 2023-03-14 DIAGNOSIS — Z87.891 PERSONAL HISTORY OF TOBACCO USE: ICD-10-CM

## 2023-03-14 DIAGNOSIS — J43.9 PULMONARY EMPHYSEMA, UNSPECIFIED EMPHYSEMA TYPE (HCC): ICD-10-CM

## 2023-03-14 DIAGNOSIS — C34.91 SQUAMOUS CELL CARCINOMA OF RIGHT LUNG (HCC): ICD-10-CM

## 2023-03-14 ASSESSMENT — ENCOUNTER SYMPTOMS
SHORTNESS OF BREATH: 1
WHEEZING: 1
SINUS PRESSURE: 0
SINUS PAIN: 0
COUGH: 0
CHEST TIGHTNESS: 1
RHINORRHEA: 0

## 2023-03-14 NOTE — PATIENT INSTRUCTIONS
Continue Spiriva. Continue your albuterol inhaler. You may take 2 puffs every 6 hours as needed for shortness of breath or wheezing. I will see you back in about 1-2 weeks with your PET scan.

## 2023-03-24 ENCOUNTER — OFFICE VISIT (OUTPATIENT)
Dept: CARDIOLOGY CLINIC | Age: 68
End: 2023-03-24
Payer: MEDICARE

## 2023-03-24 VITALS
SYSTOLIC BLOOD PRESSURE: 134 MMHG | HEART RATE: 66 BPM | HEIGHT: 69 IN | WEIGHT: 215 LBS | BODY MASS INDEX: 31.84 KG/M2 | DIASTOLIC BLOOD PRESSURE: 71 MMHG

## 2023-03-24 DIAGNOSIS — R06.02 SOB (SHORTNESS OF BREATH): ICD-10-CM

## 2023-03-24 DIAGNOSIS — I10 PRIMARY HYPERTENSION: ICD-10-CM

## 2023-03-24 DIAGNOSIS — R55 SYNCOPE AND COLLAPSE: Primary | ICD-10-CM

## 2023-03-24 PROCEDURE — 3078F DIAST BP <80 MM HG: CPT | Performed by: NUCLEAR MEDICINE

## 2023-03-24 PROCEDURE — 3075F SYST BP GE 130 - 139MM HG: CPT | Performed by: NUCLEAR MEDICINE

## 2023-03-24 PROCEDURE — 1123F ACP DISCUSS/DSCN MKR DOCD: CPT | Performed by: NUCLEAR MEDICINE

## 2023-03-24 PROCEDURE — 99204 OFFICE O/P NEW MOD 45 MIN: CPT | Performed by: NUCLEAR MEDICINE

## 2023-03-24 ASSESSMENT — ENCOUNTER SYMPTOMS
ANAL BLEEDING: 0
DIARRHEA: 0
PHOTOPHOBIA: 0
COLOR CHANGE: 0
CHEST TIGHTNESS: 0
ABDOMINAL PAIN: 0
BLOOD IN STOOL: 0
SHORTNESS OF BREATH: 1
ABDOMINAL DISTENTION: 0
RECTAL PAIN: 0
CONSTIPATION: 0
BACK PAIN: 0
NAUSEA: 0
VOMITING: 0

## 2023-03-24 NOTE — PROGRESS NOTES
[id number]  [facility] [address]  Dept: [phone]  Dept Fax: [phone]  Loc: [phone]    Visit Date: 3/24/2023    Abe Cruz is a 74 y.o. male who presents todayfor:  Chief Complaint   Patient presents with    New Patient    Dizziness   Here for evaluation   Had syncope  Thought to be a seizure  Seen neurology   Tilt done  Mild drop in the BP  some recent dizziness  Mild  No syncope lately   On seizure meds  Abnormal ECG   Septal infarct   Some chest pain   Some dyspnea at times  He is not a smoker  Used to smoke  Had lung cancer   Family history of CAD         HPI:  Dizziness  Associated symptoms include chest pain and fatigue. Pertinent negatives include no abdominal pain, arthralgias, joint swelling, myalgias, nausea, neck pain, rash or vomiting.    Past Medical History:   Diagnosis Date    Cancer ([facility] [address]     LUNG    COPD (chronic obstructive pulmonary disease) (HCC)     GERD (gastroesophageal reflux disease)     Hypertension     Lung mass     RIGHT UPPER LOBE    Seizures ([facility] [address] 2023      Past Surgical History:   Procedure Laterality Date    COLONOSCOPY      FRACTURE SURGERY      collar bone    LOBECTOMY Right 3/24/16    upper    LUNG BIOPSY Right 3/3/16    Dr Phillips Learn       Family History   Problem Relation Age of Onset    Lung Cancer Mother 61    Heart Disease Father 61    Diabetes Father     COPD Sister     Heart Disease Brother      Social History     Tobacco Use    Smoking status: Former     Packs/day: 0.00     Years: 41.00     Pack years: 0.00     Types: Cigarettes     Start date: 1973     Quit date: 3/24/2016     Years since quittin.0    Smokeless tobacco: Never    Tobacco comments:     Quit smoking 2016   Substance Use Topics    Alcohol use: Yes     Comment: occasionally on weekends while camping      Current Outpatient Medications   Medication Sig Dispense Refill

## 2023-03-27 ENCOUNTER — TELEPHONE (OUTPATIENT)
Dept: FAMILY MEDICINE CLINIC | Age: 68
End: 2023-03-27

## 2023-03-27 DIAGNOSIS — J44.9 CHRONIC OBSTRUCTIVE PULMONARY DISEASE, UNSPECIFIED COPD TYPE (HCC): Primary | ICD-10-CM

## 2023-03-27 NOTE — TELEPHONE ENCOUNTER
Patient calling due to handicap placard expiring. Wife has an appt today and would like to  Rx then.   Please advise

## 2023-03-28 NOTE — PROGRESS NOTES
into the lungs every 6 hours as needed for Wheezing or Shortness of Breath 1 each 5    levETIRAcetam (KEPPRA) 750 MG tablet Take 1 tablet by mouth in the morning and 1 tablet in the evening. 60 tablet 3    SPIRIVA RESPIMAT 2.5 MCG/ACT AERS inhaler INHALE 2 PUFFS BY MOUTH ONCE DAILY 1 each 11    aspirin 81 MG tablet Take 1 tablet by mouth daily      omeprazole (PRILOSEC OTC) 20 MG tablet Take 1 tablet by mouth daily       No current facility-administered medications for this visit. Jake DUKE   Review of Systems   Constitutional:  Negative for appetite change, fever and unexpected weight change. HENT:  Negative for congestion, postnasal drip, rhinorrhea, sinus pressure, sinus pain and sneezing. Respiratory:  Positive for cough, chest tightness, shortness of breath and wheezing. Denies hemoptysis   Cardiovascular:  Positive for chest pain (reports stable). Negative for palpitations and leg swelling. Following with Dr. Tiara Bowman   Genitourinary:  Negative for difficulty urinating. Allergic/Immunologic: Positive for environmental allergies. Physical exam   /72   Pulse 65   Temp 97.8 °F (36.6 °C)   Ht 5' 8.5\" (1.74 m)   Wt 219 lb (99.3 kg)   SpO2 96% Comment: r/a  BMI 32.81 kg/m²    Wt Readings from Last 3 Encounters:   04/03/23 219 lb (99.3 kg)   03/24/23 215 lb (97.5 kg)   03/14/23 209 lb 12.8 oz (95.2 kg)       Physical Exam  Constitutional:       General: He is not in acute distress. Comments: BMI 32.8   HENT:      Head: Normocephalic and atraumatic. Right Ear: External ear normal.      Left Ear: External ear normal.      Mouth/Throat:      Mouth: Mucous membranes are moist.      Pharynx: No oropharyngeal exudate or posterior oropharyngeal erythema. Eyes:      General:         Right eye: No discharge. Left eye: No discharge. Cardiovascular:      Rate and Rhythm: Normal rate and regular rhythm.    Pulmonary:      Effort: Pulmonary effort is normal. No respiratory

## 2023-03-29 ENCOUNTER — HOSPITAL ENCOUNTER (OUTPATIENT)
Dept: PET IMAGING | Age: 68
Discharge: HOME | End: 2023-03-29
Payer: MEDICARE

## 2023-03-29 DIAGNOSIS — J44.9 STAGE 2 MODERATE COPD BY GOLD CLASSIFICATION (HCC): ICD-10-CM

## 2023-03-29 DIAGNOSIS — R91.1 NODULE OF LEFT LUNG: ICD-10-CM

## 2023-03-29 DIAGNOSIS — J43.9 PULMONARY EMPHYSEMA, UNSPECIFIED EMPHYSEMA TYPE (HCC): ICD-10-CM

## 2023-03-29 DIAGNOSIS — Z87.891 PERSONAL HISTORY OF TOBACCO USE: ICD-10-CM

## 2023-03-29 DIAGNOSIS — C34.91 SQUAMOUS CELL CARCINOMA OF RIGHT LUNG (HCC): ICD-10-CM

## 2023-03-29 PROCEDURE — 3430000000 HC RX DIAGNOSTIC RADIOPHARMACEUTICAL

## 2023-03-29 PROCEDURE — 78815 PET IMAGE W/CT SKULL-THIGH: CPT

## 2023-03-29 PROCEDURE — A9552 F18 FDG: HCPCS

## 2023-03-29 RX ORDER — FLUDEOXYGLUCOSE F 18 200 MCI/ML
14.8 INJECTION, SOLUTION INTRAVENOUS
Status: COMPLETED | OUTPATIENT
Start: 2023-03-29 | End: 2023-03-29

## 2023-03-29 RX ADMIN — FLUDEOXYGLUCOSE F 18 14.8 MILLICURIE: 200 INJECTION, SOLUTION INTRAVENOUS at 08:24

## 2023-04-03 ENCOUNTER — OFFICE VISIT (OUTPATIENT)
Dept: PULMONOLOGY | Age: 68
End: 2023-04-03
Payer: MEDICARE

## 2023-04-03 VITALS
SYSTOLIC BLOOD PRESSURE: 124 MMHG | OXYGEN SATURATION: 96 % | TEMPERATURE: 97.8 F | DIASTOLIC BLOOD PRESSURE: 72 MMHG | BODY MASS INDEX: 32.44 KG/M2 | WEIGHT: 219 LBS | HEART RATE: 65 BPM | HEIGHT: 69 IN

## 2023-04-03 DIAGNOSIS — J44.9 STAGE 2 MODERATE COPD BY GOLD CLASSIFICATION (HCC): Primary | ICD-10-CM

## 2023-04-03 DIAGNOSIS — Z87.891 PERSONAL HISTORY OF TOBACCO USE: ICD-10-CM

## 2023-04-03 DIAGNOSIS — R91.8 MULTIPLE LUNG NODULES ON CT: ICD-10-CM

## 2023-04-03 DIAGNOSIS — C34.91 SQUAMOUS CELL CARCINOMA OF RIGHT LUNG (HCC): ICD-10-CM

## 2023-04-03 DIAGNOSIS — J43.9 PULMONARY EMPHYSEMA, UNSPECIFIED EMPHYSEMA TYPE (HCC): ICD-10-CM

## 2023-04-03 PROCEDURE — 3078F DIAST BP <80 MM HG: CPT

## 2023-04-03 PROCEDURE — 1123F ACP DISCUSS/DSCN MKR DOCD: CPT

## 2023-04-03 PROCEDURE — 99214 OFFICE O/P EST MOD 30 MIN: CPT

## 2023-04-03 PROCEDURE — 3074F SYST BP LT 130 MM HG: CPT

## 2023-04-03 ASSESSMENT — ENCOUNTER SYMPTOMS
SINUS PRESSURE: 0
CHEST TIGHTNESS: 1
COUGH: 1
WHEEZING: 1
SINUS PAIN: 0
RHINORRHEA: 0
SHORTNESS OF BREATH: 1

## 2023-04-07 ENCOUNTER — HOSPITAL ENCOUNTER (OUTPATIENT)
Dept: NON INVASIVE DIAGNOSTICS | Age: 68
Discharge: HOME | End: 2023-04-07
Payer: MEDICARE

## 2023-04-07 DIAGNOSIS — I10 PRIMARY HYPERTENSION: ICD-10-CM

## 2023-04-07 DIAGNOSIS — R06.02 SOB (SHORTNESS OF BREATH): ICD-10-CM

## 2023-04-07 DIAGNOSIS — R55 SYNCOPE AND COLLAPSE: ICD-10-CM

## 2023-04-07 LAB
LV EF: 60 %
LVEF MODALITY: NORMAL

## 2023-04-07 PROCEDURE — 93306 TTE W/DOPPLER COMPLETE: CPT

## 2023-04-07 PROCEDURE — A9500 TC99M SESTAMIBI: HCPCS | Performed by: NUCLEAR MEDICINE

## 2023-04-07 PROCEDURE — 6360000002 HC RX W HCPCS

## 2023-04-07 PROCEDURE — 78452 HT MUSCLE IMAGE SPECT MULT: CPT | Performed by: NUCLEAR MEDICINE

## 2023-04-07 PROCEDURE — 93017 CV STRESS TEST TRACING ONLY: CPT | Performed by: NUCLEAR MEDICINE

## 2023-04-07 PROCEDURE — 3430000000 HC RX DIAGNOSTIC RADIOPHARMACEUTICAL: Performed by: NUCLEAR MEDICINE

## 2023-04-07 RX ORDER — TETRAKIS(2-METHOXYISOBUTYLISOCYANIDE)COPPER(I) TETRAFLUOROBORATE 1 MG/ML
30.3 INJECTION, POWDER, LYOPHILIZED, FOR SOLUTION INTRAVENOUS
Status: COMPLETED | OUTPATIENT
Start: 2023-04-07 | End: 2023-04-07

## 2023-04-07 RX ORDER — TETRAKIS(2-METHOXYISOBUTYLISOCYANIDE)COPPER(I) TETRAFLUOROBORATE 1 MG/ML
10 INJECTION, POWDER, LYOPHILIZED, FOR SOLUTION INTRAVENOUS
Status: COMPLETED | OUTPATIENT
Start: 2023-04-07 | End: 2023-04-07

## 2023-04-07 RX ADMIN — Medication 10 MILLICURIE: at 13:50

## 2023-04-07 RX ADMIN — Medication 30.3 MILLICURIE: at 15:05

## 2023-04-14 ENCOUNTER — OFFICE VISIT (OUTPATIENT)
Dept: NEUROLOGY | Age: 68
End: 2023-04-14
Payer: MEDICARE

## 2023-04-14 VITALS
HEIGHT: 69 IN | HEART RATE: 64 BPM | SYSTOLIC BLOOD PRESSURE: 130 MMHG | DIASTOLIC BLOOD PRESSURE: 70 MMHG | BODY MASS INDEX: 32.73 KG/M2 | WEIGHT: 221 LBS | OXYGEN SATURATION: 99 %

## 2023-04-14 DIAGNOSIS — R56.9 SINGLE SEIZURE (HCC): Primary | ICD-10-CM

## 2023-04-14 DIAGNOSIS — R55 SYNCOPE AND COLLAPSE: ICD-10-CM

## 2023-04-14 PROCEDURE — 3074F SYST BP LT 130 MM HG: CPT | Performed by: PSYCHIATRY & NEUROLOGY

## 2023-04-14 PROCEDURE — 3078F DIAST BP <80 MM HG: CPT | Performed by: PSYCHIATRY & NEUROLOGY

## 2023-04-14 PROCEDURE — 99214 OFFICE O/P EST MOD 30 MIN: CPT | Performed by: PSYCHIATRY & NEUROLOGY

## 2023-04-14 PROCEDURE — 1123F ACP DISCUSS/DSCN MKR DOCD: CPT | Performed by: PSYCHIATRY & NEUROLOGY

## 2023-04-14 RX ORDER — LEVETIRACETAM 250 MG/1
TABLET ORAL
Qty: 50 TABLET | Refills: 0 | Status: SHIPPED | OUTPATIENT
Start: 2023-04-14

## 2023-06-22 ENCOUNTER — TELEPHONE (OUTPATIENT)
Dept: FAMILY MEDICINE CLINIC | Age: 68
End: 2023-06-22

## 2023-06-22 NOTE — TELEPHONE ENCOUNTER
----- Message from Osman Machado sent at 6/22/2023  2:41 PM EDT -----  Subject: Message to Provider    QUESTIONS  Information for Provider? Patient called to schedule his annual wellness   later than July 3rd. He has appt now on June 27th but goes to his doctor   for the seizures on July 3 and thought should he wait to come to us after   that appt? was not sure if should reschedule his june 27th  ---------------------------------------------------------------------------  --------------  [address] [employer]  [id number]; OK to leave message on voicemail  ---------------------------------------------------------------------------  --------------  SCRIPT ANSWERS  Relationship to Patient?  Self

## 2023-07-03 ENCOUNTER — OFFICE VISIT (OUTPATIENT)
Dept: NEUROLOGY | Age: 68
End: 2023-07-03
Payer: MEDICARE

## 2023-07-03 VITALS
HEIGHT: 69 IN | OXYGEN SATURATION: 97 % | BODY MASS INDEX: 33.18 KG/M2 | DIASTOLIC BLOOD PRESSURE: 75 MMHG | SYSTOLIC BLOOD PRESSURE: 130 MMHG | WEIGHT: 224 LBS | HEART RATE: 83 BPM

## 2023-07-03 DIAGNOSIS — R55 SYNCOPE AND COLLAPSE: ICD-10-CM

## 2023-07-03 DIAGNOSIS — R56.9 SINGLE SEIZURE (HCC): Primary | ICD-10-CM

## 2023-07-03 PROCEDURE — 3078F DIAST BP <80 MM HG: CPT | Performed by: NURSE PRACTITIONER

## 2023-07-03 PROCEDURE — 3075F SYST BP GE 130 - 139MM HG: CPT | Performed by: NURSE PRACTITIONER

## 2023-07-03 PROCEDURE — 1123F ACP DISCUSS/DSCN MKR DOCD: CPT | Performed by: NURSE PRACTITIONER

## 2023-07-03 PROCEDURE — 99213 OFFICE O/P EST LOW 20 MIN: CPT | Performed by: NURSE PRACTITIONER

## 2023-07-03 NOTE — PROGRESS NOTES
NEUROLOGY OUT PATIENT FOLLOW UP NOTE:  7/3/37997:57 AM    Castro Branch is here for follow up for seizure. Allergies   Allergen Reactions    Adhesive Tape        Current Outpatient Medications:     levETIRAcetam (KEPPRA) 250 MG tablet, Take Keppra 500 mg twice a day for one week, then 250 mg twice a day for one week, then 250 mg once a day for one week, then stop., Disp: 50 tablet, Rfl: 0    atorvastatin (LIPITOR) 20 MG tablet, TAKE 1 TABLET BY MOUTH AT BEDTIME, Disp: 90 tablet, Rfl: 3    allopurinol (ZYLOPRIM) 100 MG tablet, TAKE 1 TABLET BY MOUTH ONCE DAILY, Disp: 90 tablet, Rfl: 3    albuterol sulfate HFA (PROVENTIL;VENTOLIN;PROAIR) 108 (90 Base) MCG/ACT inhaler, Inhale 2 puffs into the lungs every 6 hours as needed for Wheezing or Shortness of Breath, Disp: 1 each, Rfl: 5    SPIRIVA RESPIMAT 2.5 MCG/ACT AERS inhaler, INHALE 2 PUFFS BY MOUTH ONCE DAILY, Disp: 1 each, Rfl: 11    aspirin 81 MG tablet, Take 1 tablet by mouth daily, Disp: , Rfl:     omeprazole (PRILOSEC OTC) 20 MG tablet, Take 1 tablet by mouth daily, Disp: , Rfl:     I reviewed the past medical history, allergies, medications, social history and family history. PE:   Vitals:    07/03/23 0852   BP: 130/75   Site: Left Upper Arm   Position: Sitting   Cuff Size: Medium Adult   Pulse: 83   SpO2: 97%   Weight: 224 lb (101.6 kg)   Height: 5' 8.5\" (1.74 m)        General Appearance:  alert and cooperative  Gen: NAD, Language is Intact. Skin: no rash, lesion,  moist to touch. warm  Head: no rash, no icterus  Neck: There is no carotid bruits. The Neck is supple. Neuro: CN 2-12 grossly intact with no focal deficits. Power 5/5 Throughout symmetric, Reflexes are  symmetric. Long tracts are intact. Cerebellar exam is Intact. Sensory exam is intact to light touch. Gait is intact. Musculoskeletal:  Has no hand arthritis, no limitation of ROM in any of the four extremities.   Lower extremities no edema          DATA:         Results for orders

## 2023-07-03 NOTE — PATIENT INSTRUCTIONS
Continue avoiding alcohol  No driving, swimming, operating heavy machinery or compromising heights until event free for 6 months Report any new events. Call if any questions. Call with any new symptoms or concerns. Follow up in 6 months.

## 2023-07-05 ENCOUNTER — OFFICE VISIT (OUTPATIENT)
Dept: FAMILY MEDICINE CLINIC | Age: 68
End: 2023-07-05
Payer: COMMERCIAL

## 2023-07-05 VITALS
DIASTOLIC BLOOD PRESSURE: 72 MMHG | WEIGHT: 228.1 LBS | HEART RATE: 80 BPM | HEIGHT: 69 IN | BODY MASS INDEX: 33.78 KG/M2 | SYSTOLIC BLOOD PRESSURE: 122 MMHG | RESPIRATION RATE: 16 BRPM

## 2023-07-05 DIAGNOSIS — R73.01 IFG (IMPAIRED FASTING GLUCOSE): ICD-10-CM

## 2023-07-05 DIAGNOSIS — Z00.00 MEDICARE ANNUAL WELLNESS VISIT, SUBSEQUENT: Primary | ICD-10-CM

## 2023-07-05 DIAGNOSIS — Z12.5 SCREENING FOR PROSTATE CANCER: ICD-10-CM

## 2023-07-05 DIAGNOSIS — N18.9 CHRONIC KIDNEY DISEASE, UNSPECIFIED CKD STAGE: ICD-10-CM

## 2023-07-05 DIAGNOSIS — M10.00 IDIOPATHIC GOUT, UNSPECIFIED CHRONICITY, UNSPECIFIED SITE: ICD-10-CM

## 2023-07-05 DIAGNOSIS — E78.6 LOW HDL (UNDER 40): ICD-10-CM

## 2023-07-05 DIAGNOSIS — I10 ESSENTIAL HYPERTENSION: ICD-10-CM

## 2023-07-05 DIAGNOSIS — J44.9 CHRONIC OBSTRUCTIVE PULMONARY DISEASE, UNSPECIFIED COPD TYPE (HCC): ICD-10-CM

## 2023-07-05 DIAGNOSIS — E78.49 OTHER HYPERLIPIDEMIA: ICD-10-CM

## 2023-07-05 PROCEDURE — 3078F DIAST BP <80 MM HG: CPT | Performed by: FAMILY MEDICINE

## 2023-07-05 PROCEDURE — 1123F ACP DISCUSS/DSCN MKR DOCD: CPT | Performed by: FAMILY MEDICINE

## 2023-07-05 PROCEDURE — 3074F SYST BP LT 130 MM HG: CPT | Performed by: FAMILY MEDICINE

## 2023-07-05 PROCEDURE — G0439 PPPS, SUBSEQ VISIT: HCPCS | Performed by: FAMILY MEDICINE

## 2023-07-05 SDOH — ECONOMIC STABILITY: HOUSING INSECURITY
IN THE LAST 12 MONTHS, WAS THERE A TIME WHEN YOU DID NOT HAVE A STEADY PLACE TO SLEEP OR SLEPT IN A SHELTER (INCLUDING NOW)?: NO

## 2023-07-05 SDOH — ECONOMIC STABILITY: INCOME INSECURITY: HOW HARD IS IT FOR YOU TO PAY FOR THE VERY BASICS LIKE FOOD, HOUSING, MEDICAL CARE, AND HEATING?: NOT HARD AT ALL

## 2023-07-05 SDOH — ECONOMIC STABILITY: FOOD INSECURITY: WITHIN THE PAST 12 MONTHS, YOU WORRIED THAT YOUR FOOD WOULD RUN OUT BEFORE YOU GOT MONEY TO BUY MORE.: NEVER TRUE

## 2023-07-05 SDOH — ECONOMIC STABILITY: FOOD INSECURITY: WITHIN THE PAST 12 MONTHS, THE FOOD YOU BOUGHT JUST DIDN'T LAST AND YOU DIDN'T HAVE MONEY TO GET MORE.: NEVER TRUE

## 2023-07-05 ASSESSMENT — ENCOUNTER SYMPTOMS
RESPIRATORY NEGATIVE: 1
GASTROINTESTINAL NEGATIVE: 1

## 2023-07-05 ASSESSMENT — PATIENT HEALTH QUESTIONNAIRE - PHQ9
SUM OF ALL RESPONSES TO PHQ QUESTIONS 1-9: 0
SUM OF ALL RESPONSES TO PHQ QUESTIONS 1-9: 0
2. FEELING DOWN, DEPRESSED OR HOPELESS: 0
SUM OF ALL RESPONSES TO PHQ QUESTIONS 1-9: 0
SUM OF ALL RESPONSES TO PHQ QUESTIONS 1-9: 0
1. LITTLE INTEREST OR PLEASURE IN DOING THINGS: 0
SUM OF ALL RESPONSES TO PHQ9 QUESTIONS 1 & 2: 0

## 2023-07-05 ASSESSMENT — LIFESTYLE VARIABLES
HOW MANY STANDARD DRINKS CONTAINING ALCOHOL DO YOU HAVE ON A TYPICAL DAY: PATIENT DOES NOT DRINK
HOW OFTEN DO YOU HAVE A DRINK CONTAINING ALCOHOL: NEVER

## 2023-07-05 NOTE — PROGRESS NOTES
sulfate HFA (PROVENTIL;VENTOLIN;PROAIR) 108 (90 Base) MCG/ACT inhaler Inhale 2 puffs into the lungs every 6 hours as needed for Wheezing or Shortness of Breath Yes Christy Rivero DO   SPIRIVA RESPIMAT 2.5 MCG/ACT AERS inhaler INHALE 2 PUFFS BY MOUTH ONCE DAILY Yes Christy Rivero DO   aspirin 81 MG tablet Take 1 tablet by mouth daily Yes Historical Provider, MD   omeprazole (PRILOSEC OTC) 20 MG tablet Take 1 tablet by mouth daily Yes Historical Provider, MD [place] (Including outside providers/suppliers regularly involved in providing care):   Patient Care Team:  Christy Rivero DO as PCP - General (Family Medicine)  Christy Rivero DO as PCP - Empaneled Provider  Manpreet Johnson MD as Consulting Physician (Pulmonary Disease)  Amado Vasquez MD as Consulting Physician (Cardiology)  Madina Medrano MD as Surgeon (Cardiothoracic Surgery)  Terra Hensley DO as Physician (Hematology and Oncology)     Reviewed and updated this visit:  Tobacco  Allergies  Meds  Med Hx  Surg Hx  Soc Hx  Fam Hx

## 2023-07-05 NOTE — PATIENT INSTRUCTIONS
your use of this information. Personalized Preventive Plan for Robert Matias - 7/5/2023  Medicare offers a range of preventive health benefits. Some of the tests and screenings are paid in full while other may be subject to a deductible, co-insurance, and/or copay. Some of these benefits include a comprehensive review of your medical history including lifestyle, illnesses that may run in your family, and various assessments and screenings as appropriate. After reviewing your medical record and screening and assessments performed today your provider may have ordered immunizations, labs, imaging, and/or referrals for you. A list of these orders (if applicable) as well as your Preventive Care list are included within your After Visit Summary for your review. Other Preventive Recommendations:    A preventive eye exam performed by an eye specialist is recommended every 1-2 years to screen for glaucoma; cataracts, macular degeneration, and other eye disorders. A preventive dental visit is recommended every 6 months. Try to get at least 150 minutes of exercise per week or 10,000 steps per day on a pedometer . Order or download the FREE \"Exercise & Physical Activity: Your Everyday Guide\" from [employer]. Call 9-221.209.6324 or search [employer] online. You need 4132-0202 mg of calcium and 2868-3986 IU of vitamin D per day. It is possible to meet your calcium requirement with diet alone, but a vitamin D supplement is usually necessary to meet this goal.  When exposed to the sun, use a sunscreen that protects against both UVA and UVB radiation with an SPF of 30 or greater. Reapply every 2 to 3 hours or after sweating, drying off with a towel, or swimming. Always wear a seat belt when traveling in a car. Always wear a helmet when riding a bicycle or motorcycle.

## 2023-08-29 RX ORDER — TIOTROPIUM BROMIDE INHALATION SPRAY 3.12 UG/1
SPRAY, METERED RESPIRATORY (INHALATION)
Qty: 1 EACH | Refills: 11 | Status: SHIPPED | OUTPATIENT
Start: 2023-08-29

## 2023-08-29 NOTE — TELEPHONE ENCOUNTER
Patient called and stated that he needs a refill on his Spiriva.      Patient will check his pharmacy around 1:00 pm

## 2023-09-13 ENCOUNTER — HOSPITAL ENCOUNTER (OUTPATIENT)
Dept: CT IMAGING | Age: 68
Discharge: HOME | End: 2023-09-13
Payer: COMMERCIAL

## 2023-09-13 DIAGNOSIS — R91.8 MULTIPLE LUNG NODULES ON CT: ICD-10-CM

## 2023-09-13 DIAGNOSIS — Z87.891 PERSONAL HISTORY OF TOBACCO USE: ICD-10-CM

## 2023-09-13 DIAGNOSIS — C34.91 SQUAMOUS CELL CARCINOMA OF RIGHT LUNG (HCC): ICD-10-CM

## 2023-09-13 PROCEDURE — 71250 CT THORAX DX C-: CPT

## 2023-09-14 ENCOUNTER — OFFICE VISIT (OUTPATIENT)
Dept: PULMONOLOGY | Age: 68
End: 2023-09-14
Payer: COMMERCIAL

## 2023-09-14 VITALS
WEIGHT: 224.4 LBS | SYSTOLIC BLOOD PRESSURE: 125 MMHG | HEART RATE: 71 BPM | OXYGEN SATURATION: 97 % | DIASTOLIC BLOOD PRESSURE: 70 MMHG | BODY MASS INDEX: 33.24 KG/M2 | HEIGHT: 69 IN | TEMPERATURE: 97.9 F

## 2023-09-14 DIAGNOSIS — J43.9 PULMONARY EMPHYSEMA, UNSPECIFIED EMPHYSEMA TYPE (HCC): ICD-10-CM

## 2023-09-14 DIAGNOSIS — Z87.891 PERSONAL HISTORY OF TOBACCO USE: ICD-10-CM

## 2023-09-14 DIAGNOSIS — C34.91 SQUAMOUS CELL CARCINOMA OF RIGHT LUNG (HCC): ICD-10-CM

## 2023-09-14 DIAGNOSIS — J44.9 STAGE 2 MODERATE COPD BY GOLD CLASSIFICATION (HCC): Primary | ICD-10-CM

## 2023-09-14 PROCEDURE — 3074F SYST BP LT 130 MM HG: CPT

## 2023-09-14 PROCEDURE — 3078F DIAST BP <80 MM HG: CPT

## 2023-09-14 PROCEDURE — 99214 OFFICE O/P EST MOD 30 MIN: CPT

## 2023-09-14 PROCEDURE — 1123F ACP DISCUSS/DSCN MKR DOCD: CPT

## 2023-09-14 ASSESSMENT — ENCOUNTER SYMPTOMS
SHORTNESS OF BREATH: 1
SINUS PRESSURE: 0
COUGH: 1
WHEEZING: 1
RHINORRHEA: 0
SINUS PAIN: 0
CHEST TIGHTNESS: 1

## 2023-09-14 NOTE — PATIENT INSTRUCTIONS
Stop Spiriva    Start Stiolto 2 puffs daily    Continue your albuterol inhaler. You may take 2 puffs every 6 hours as needed for shortness of breath or wheezing. I will see you back in 4 months.

## 2023-09-25 ENCOUNTER — OFFICE VISIT (OUTPATIENT)
Dept: CARDIOLOGY CLINIC | Age: 68
End: 2023-09-25
Payer: COMMERCIAL

## 2023-09-25 VITALS
SYSTOLIC BLOOD PRESSURE: 128 MMHG | HEIGHT: 69 IN | BODY MASS INDEX: 32.58 KG/M2 | HEART RATE: 64 BPM | WEIGHT: 220 LBS | DIASTOLIC BLOOD PRESSURE: 70 MMHG

## 2023-09-25 DIAGNOSIS — E78.01 FAMILIAL HYPERCHOLESTEROLEMIA: Primary | ICD-10-CM

## 2023-09-25 PROCEDURE — 99213 OFFICE O/P EST LOW 20 MIN: CPT | Performed by: NUCLEAR MEDICINE

## 2023-09-25 PROCEDURE — 1123F ACP DISCUSS/DSCN MKR DOCD: CPT | Performed by: NUCLEAR MEDICINE

## 2023-09-25 PROCEDURE — 3074F SYST BP LT 130 MM HG: CPT | Performed by: NUCLEAR MEDICINE

## 2023-09-25 PROCEDURE — 3078F DIAST BP <80 MM HG: CPT | Performed by: NUCLEAR MEDICINE

## 2023-09-25 RX ORDER — ATORVASTATIN CALCIUM 20 MG/1
20 TABLET, FILM COATED ORAL NIGHTLY
COMMUNITY

## 2023-09-25 NOTE — PROGRESS NOTES
Patient here for check up.
(PROVENTIL;VENTOLIN;PROAIR) 108 (90 Base) MCG/ACT inhaler Inhale 2 puffs into the lungs every 6 hours as needed for Wheezing or Shortness of Breath 1 each 5    aspirin 81 MG tablet Take 1 tablet by mouth daily      omeprazole (PRILOSEC OTC) 20 MG tablet Take 1 tablet by mouth daily       No current facility-administered medications for this visit. Allergies   Allergen Reactions    Adhesive Tape      Health Maintenance   Topic Date Due    HIV screen  Never done    Hepatitis C screen  Never done    DTaP/Tdap/Td vaccine (1 - Tdap) Never done    Shingles vaccine (1 of 2) Never done    Pneumococcal 65+ years Vaccine (2 - PCV) 11/15/2018    COVID-19 Vaccine (3 - Pfizer series) 06/09/2021    AAA screen  Never done    Flu vaccine (1) 08/01/2023    A1C test (Diabetic or Prediabetic)  11/29/2023    Lipids  11/29/2023    GFR test (Diabetes, CKD 3-4, OR last GFR 15-59)  01/02/2024    Low dose CT lung screening &/or counseling  03/09/2024    Depression Screen  07/05/2024    Colorectal Cancer Screen  04/02/2025    Hepatitis A vaccine  Aged Out    Hepatitis B vaccine  Aged Out    Hib vaccine  Aged Out    Meningococcal (ACWY) vaccine  Aged Out    Pneumococcal 0-64 years Vaccine  Discontinued    Prostate Specific Antigen (PSA) Screening or Monitoring  Discontinued       Subjective:  General:   No fever, no chills, No fatigue or weight loss  Pulmonary:    No dyspnea, no wheezing  Cardiac:    Denies recent chest pain,   GI:     No nausea or vomiting, no abdominal pain  Neuro:    No dizziness or light headedness,   Musculoskeletal:  No recent active issues  Extremities:   No edema, no obvious claudication       Objective:  General:   Well developed, well nourished  Lungs:   Clear to auscultation  Heart:    Normal S1 S2, Slight murmur. no rubs, no gallops  Abdomen:   Soft, non tender, no organomegalies, positive bowel sounds  Extremities:   No edema, no cyanosis, good peripheral pulses  Neurological:   Awake, alert, oriented.  No

## 2023-10-09 RX ORDER — ATORVASTATIN CALCIUM 20 MG/1
20 TABLET, FILM COATED ORAL NIGHTLY
Qty: 90 TABLET | Refills: 3 | Status: SHIPPED | OUTPATIENT
Start: 2023-10-09

## 2023-10-09 NOTE — TELEPHONE ENCOUNTER
Patient requesting refill of Lipitor to [facility].   Will check with pharmacy after 1pm.  Please refill if appropriate

## 2023-12-28 LAB
ABSOLUTE BASO #: 0.06 K/UL (ref 0–0.2)
ABSOLUTE EOS #: 0.33 K/UL (ref 0–0.5)
ABSOLUTE LYMPH #: 1.5 K/UL (ref 1–4)
ABSOLUTE MONO #: 0.69 K/UL (ref 0.2–1)
ABSOLUTE NEUT #: 4.48 K/UL (ref 1.5–7.5)
ALBUMIN SERPL-MCNC: 4.7 G/DL (ref 3.5–5.2)
ALK PHOSPHATASE: 122 U/L (ref 40–125)
ALT SERPL-CCNC: 46 U/L (ref 5–50)
ANION GAP SERPL CALCULATED.3IONS-SCNC: 9 MEQ/L (ref 7–16)
AST SERPL-CCNC: 50 U/L (ref 9–50)
AVERAGE GLUCOSE: 123 MG/DL
BASOPHILS RELATIVE PERCENT: 0.8 %
BILIRUB SERPL-MCNC: 0.5 MG/DL
BUN BLDV-MCNC: 8 MG/DL (ref 8–23)
CALCIUM SERPL-MCNC: 9.5 MG/DL (ref 8.5–10.5)
CHLORIDE BLD-SCNC: 102 MEQ/L (ref 95–107)
CHOLESTEROL/HDL RATIO: 4 RATIO
CHOLESTEROL: 135 MG/DL
CO2: 29 MEQ/L (ref 19–31)
CREAT SERPL-MCNC: 1.13 MG/DL (ref 0.8–1.4)
EGFR IF NONAFRICAN AMERICAN: 72 ML/MIN/1.73
EOSINOPHILS RELATIVE PERCENT: 4.6 %
GLUCOSE: 126 MG/DL (ref 70–99)
HBA1C MFR BLD: 5.9 % (ref 4.2–5.6)
HCT VFR BLD CALC: 41.4 % (ref 40–51)
HDLC SERPL-MCNC: 34 MG/DL
HEMOGLOBIN: 14.3 G/DL (ref 13.5–17)
LDL CHOLESTEROL CALCULATED: 83 MG/DL
LDL/HDL RATIO: 2.4 RATIO
LYMPHOCYTE %: 21.1 %
MCH RBC QN AUTO: 32.1 PG (ref 25–33)
MCHC RBC AUTO-ENTMCNC: 34.5 G/DL (ref 31–36)
MCV RBC AUTO: 92.8 FL (ref 80–99)
MONOCYTES # BLD: 9.7 %
NEUTROPHILS RELATIVE PERCENT: 63.2 %
PDW BLD-RTO: 12.4 % (ref 11.5–15)
PLATELETS: 229 K/UL (ref 130–400)
PMV BLD AUTO: 9.2 FL (ref 9.3–13)
POTASSIUM SERPL-SCNC: 4.6 MEQ/L (ref 3.5–5.4)
PSA, ULTRASENSITIVE: 1.33 NG/ML
RBC: 4.46 M/UL (ref 4.5–6.1)
SODIUM BLD-SCNC: 140 MEQ/L (ref 133–146)
TOTAL PROTEIN: 7.4 G/DL (ref 6.1–8.3)
TRIGL SERPL-MCNC: 89 MG/DL
TSH SERPL DL<=0.05 MIU/L-ACNC: 2.42 UIU/ML (ref 0.4–4.1)
VLDLC SERPL CALC-MCNC: 18 MG/DL
WBC: 7.1 K/UL (ref 3.5–11)

## 2024-01-04 ENCOUNTER — TELEPHONE (OUTPATIENT)
Dept: FAMILY MEDICINE CLINIC | Age: 69
End: 2024-01-04

## 2024-01-04 ENCOUNTER — OFFICE VISIT (OUTPATIENT)
Dept: FAMILY MEDICINE CLINIC | Age: 69
End: 2024-01-04
Payer: COMMERCIAL

## 2024-01-04 VITALS
SYSTOLIC BLOOD PRESSURE: 130 MMHG | RESPIRATION RATE: 16 BRPM | BODY MASS INDEX: 31.86 KG/M2 | HEART RATE: 72 BPM | HEIGHT: 69 IN | WEIGHT: 215.1 LBS | DIASTOLIC BLOOD PRESSURE: 70 MMHG

## 2024-01-04 DIAGNOSIS — M10.00 IDIOPATHIC GOUT, UNSPECIFIED CHRONICITY, UNSPECIFIED SITE: ICD-10-CM

## 2024-01-04 DIAGNOSIS — R73.01 IFG (IMPAIRED FASTING GLUCOSE): ICD-10-CM

## 2024-01-04 DIAGNOSIS — J44.9 CHRONIC OBSTRUCTIVE PULMONARY DISEASE, UNSPECIFIED COPD TYPE (HCC): Primary | ICD-10-CM

## 2024-01-04 DIAGNOSIS — N18.9 CHRONIC KIDNEY DISEASE, UNSPECIFIED CKD STAGE: ICD-10-CM

## 2024-01-04 DIAGNOSIS — E78.49 OTHER HYPERLIPIDEMIA: ICD-10-CM

## 2024-01-04 DIAGNOSIS — C34.91 SQUAMOUS CELL CARCINOMA OF RIGHT LUNG (HCC): ICD-10-CM

## 2024-01-04 DIAGNOSIS — E78.6 LOW HDL (UNDER 40): ICD-10-CM

## 2024-01-04 DIAGNOSIS — R56.9 SINGLE SEIZURE (HCC): ICD-10-CM

## 2024-01-04 DIAGNOSIS — I10 ESSENTIAL HYPERTENSION: ICD-10-CM

## 2024-01-04 PROCEDURE — 3075F SYST BP GE 130 - 139MM HG: CPT | Performed by: FAMILY MEDICINE

## 2024-01-04 PROCEDURE — 3078F DIAST BP <80 MM HG: CPT | Performed by: FAMILY MEDICINE

## 2024-01-04 PROCEDURE — 1123F ACP DISCUSS/DSCN MKR DOCD: CPT | Performed by: FAMILY MEDICINE

## 2024-01-04 PROCEDURE — 99214 OFFICE O/P EST MOD 30 MIN: CPT | Performed by: FAMILY MEDICINE

## 2024-01-04 RX ORDER — ALLOPURINOL 100 MG/1
TABLET ORAL
Qty: 90 TABLET | Refills: 3 | Status: SHIPPED | OUTPATIENT
Start: 2024-01-04

## 2024-01-04 ASSESSMENT — PATIENT HEALTH QUESTIONNAIRE - PHQ9
SUM OF ALL RESPONSES TO PHQ QUESTIONS 1-9: 0
2. FEELING DOWN, DEPRESSED OR HOPELESS: 0
SUM OF ALL RESPONSES TO PHQ QUESTIONS 1-9: 0
SUM OF ALL RESPONSES TO PHQ QUESTIONS 1-9: 0
1. LITTLE INTEREST OR PLEASURE IN DOING THINGS: 0
SUM OF ALL RESPONSES TO PHQ QUESTIONS 1-9: 0
SUM OF ALL RESPONSES TO PHQ9 QUESTIONS 1 & 2: 0

## 2024-01-04 ASSESSMENT — ENCOUNTER SYMPTOMS
RESPIRATORY NEGATIVE: 1
GASTROINTESTINAL NEGATIVE: 1

## 2024-01-04 NOTE — PROGRESS NOTES
Loi Calderon (:  1955) is a 68 y.o. male,Established patient, here for evaluation of the following chief complaint(s):  6 Month Follow-Up and Hypertension          Subjective   SUBJECTIVE/OBJECTIVE:  HPI  Chief Complaint   Patient presents with    6 Month Follow-Up    Hypertension     6 month eval.      Doing well.    Continues to follow closely with Pulm and Cardio.    Lab Results   Component Value Date    LABA1C 5.9 (H) 2023    LABA1C 5.5 2022    LABA1C 5.6 2021     Lab Results   Component Value Date    LDLCALC 83 2023    CREATININE 1.13 2023     BP Readings from Last 3 Encounters:   24 130/70   23 128/70   23 125/70     Down 13 lbs since July visit.  Eating better.   Wt Readings from Last 3 Encounters:   24 97.6 kg (215 lb 1.6 oz)   23 99.8 kg (220 lb)   23 101.8 kg (224 lb 6.4 oz)       Patient Active Problem List   Diagnosis    HTN (hypertension)    IFG (impaired fasting glucose)    Personal history of tobacco use    Stage 2 moderate COPD by GOLD classification (HCC)    Low HDL (under 40)    Situational anxiety    Squamous cell carcinoma of right lung (HCC)    Open wound of back, complicated    Seizure (HCC)    Pulmonary emphysema (HCC)    Multiple lung nodules on CT       Current Outpatient Medications   Medication Sig Dispense Refill    atorvastatin (LIPITOR) 20 MG tablet Take 1 tablet by mouth nightly 90 tablet 3    tiotropium-olodaterol (STIOLTO) 2.5-2.5 MCG/ACT AERS Inhale 2 puffs into the lungs daily 4 g 11    allopurinol (ZYLOPRIM) 100 MG tablet TAKE 1 TABLET BY MOUTH ONCE DAILY 90 tablet 3    albuterol sulfate HFA (PROVENTIL;VENTOLIN;PROAIR) 108 (90 Base) MCG/ACT inhaler Inhale 2 puffs into the lungs every 6 hours as needed for Wheezing or Shortness of Breath 1 each 5    aspirin 81 MG tablet Take 1 tablet by mouth daily      omeprazole (PRILOSEC OTC) 20 MG tablet Take 1 tablet by mouth daily       No current

## 2024-01-15 NOTE — PROGRESS NOTES
AEROSPHERE) 160-9-4.8 MCG/ACT AERO      2. Pulmonary emphysema, unspecified emphysema type (HCC)  Budeson-Glycopyrrol-Formoterol (BREZTRI AEROSPHERE) 160-9-4.8 MCG/ACT AERO      3. Personal history of tobacco use        4. Multiple lung nodules on CT              Plan   1. Stage 2 moderate COPD by GOLD classification (HCC) - not at goal  -continues to have symptoms of shortness of breath in evenings and exertional dyspnea while on Stiolto. Stop Stiolto  -Start budeson-Glycopyrrol-Formoterol (BREZTRI AEROSPHERE) 160-9-4.8 MCG/ACT AERO; Inhale 2 puffs into the lungs 2 times daily Rinse mouth with water, gargle, and spit after use.  Provided with 2 samples and spacer in office.  -Continue albuterol 2 puffs every 6 hours as needed for shortness of breath or wheezing   -Reviewed preventative vaccinations    2. Personal history of tobacco use  -Quit in 2016  -Continue annual CT lung screenings    2. Multiple lung nodules on CT with personal history of squamous cell carcinoma of the right lung status post lobectomy in 2016  -Stable on CT from 9/13/23  -Repeat as annually as noted above for surveillance. This will need ordered at patient's next appt     Advised patient to call office with any changes, questions, or concerns regarding respiratory status or issues with prescribed medications    Return in about 3 months (around 4/17/2024) for COPD med check.       Electronically signed by DEMI Espino CNP on 1/17/2024 at 8:39 AM     (Please note that portions of this note may have been completed with a voice recognition program. Efforts were made to edit the dictation but occasionally words are mis-transcribed)

## 2024-01-17 ENCOUNTER — OFFICE VISIT (OUTPATIENT)
Dept: PULMONOLOGY | Age: 69
End: 2024-01-17
Payer: COMMERCIAL

## 2024-01-17 VITALS
BODY MASS INDEX: 32.16 KG/M2 | DIASTOLIC BLOOD PRESSURE: 70 MMHG | WEIGHT: 212.2 LBS | TEMPERATURE: 97.9 F | SYSTOLIC BLOOD PRESSURE: 122 MMHG | OXYGEN SATURATION: 97 % | HEIGHT: 68 IN | HEART RATE: 88 BPM

## 2024-01-17 DIAGNOSIS — R91.8 MULTIPLE LUNG NODULES ON CT: ICD-10-CM

## 2024-01-17 DIAGNOSIS — J44.9 STAGE 2 MODERATE COPD BY GOLD CLASSIFICATION (HCC): Primary | ICD-10-CM

## 2024-01-17 DIAGNOSIS — Z87.891 PERSONAL HISTORY OF TOBACCO USE: ICD-10-CM

## 2024-01-17 DIAGNOSIS — J43.9 PULMONARY EMPHYSEMA, UNSPECIFIED EMPHYSEMA TYPE (HCC): ICD-10-CM

## 2024-01-17 PROCEDURE — 3078F DIAST BP <80 MM HG: CPT

## 2024-01-17 PROCEDURE — 99214 OFFICE O/P EST MOD 30 MIN: CPT

## 2024-01-17 PROCEDURE — 3074F SYST BP LT 130 MM HG: CPT

## 2024-01-17 PROCEDURE — 1123F ACP DISCUSS/DSCN MKR DOCD: CPT

## 2024-01-17 RX ORDER — BUDESONIDE, GLYCOPYRROLATE, AND FORMOTEROL FUMARATE 160; 9; 4.8 UG/1; UG/1; UG/1
2 AEROSOL, METERED RESPIRATORY (INHALATION) 2 TIMES DAILY
Qty: 10.7 G | Refills: 11 | Status: SHIPPED | OUTPATIENT
Start: 2024-01-17 | End: 2025-01-16

## 2024-01-17 ASSESSMENT — ENCOUNTER SYMPTOMS
SHORTNESS OF BREATH: 1
CHEST TIGHTNESS: 0
RHINORRHEA: 0
SINUS PAIN: 0
WHEEZING: 1
SINUS PRESSURE: 0
COUGH: 1

## 2024-01-17 NOTE — PATIENT INSTRUCTIONS
Stop Stiolto    Start Breztri 2 puffs twice daily. Rinse mouth with water, gargle, and spit after use.     Continue your albuterol inhaler. You may take 2 puffs every 6 hours as needed for shortness of breath or wheezing.      I will see you back in 3 months.

## 2024-04-15 NOTE — PROGRESS NOTES
[facility]    Patient: REINA SCHMIDT, 68 y.o.   : 1955    Patient of Dr. Bledsoe    Assessment/Plan   1. Stage 2 moderate COPD by GOLD classification (HCC) - stable  -Continue Breztri. Rinse mouth with water, gargle, and spit after use.   -Continue albuterol sulfate HFA (PROVENTIL;VENTOLIN;PROAIR) 108 (90 Base) MCG/ACT inhaler; Inhale 2 puffs into the lungs every 6 hours as needed for Wheezing or Shortness of Breath    -Update spirometry prior to next visit  -Reviewed preventative vaccinations    2. Personal history of tobacco use  -Patient due for lung screening in 2024, orders placed  -HI VISIT TO DISCUSS LUNG CA SCREEN W LDCT  -CT Lung Screen (Annual)    3. Squamous cell carcinoma of right lung status post RUL lobectomy in 2016   -Most recent CT stable, will follow with repeat in 2024    Advised patient to call office with any changes, questions, or concerns regarding respiratory status or issues with prescribed medications    Return in about 5 months (around 2024) for COPD with LDCT and [facility].        Subjective     Chief Complaint   Patient presents with    Follow-up     Copd 3 month med check        HPI  Complaints: Shortness of Breath  Onset Duration: Over a year  Exacerbating factors: Exertion  Alleviating factors: Rest  Associated symptoms: \"some\" Wheezing and Chest Tightness - mostly to bottom of lungs \"sometimes\"  Pertinent negatives: Cough, Sputum Production, and Hemoptysis  Risk factors for lung disease: tobacco exposure and animal exposure    Reina is here for follow up for COPD and hx of SCLC status post RUL lobectomy. Overall patient reports respiratory symptoms have been improved since last appointment. Patient reports good compliance with inhaled medications (Breztri). Patient using albuterol 3-4 times per week on average. Patient reports physical limitation due to respiratory symptoms. His past medical history is

## 2024-04-17 ENCOUNTER — OFFICE VISIT (OUTPATIENT)
Dept: PULMONOLOGY | Age: 69
End: 2024-04-17
Payer: COMMERCIAL

## 2024-04-17 VITALS
TEMPERATURE: 97.8 F | HEIGHT: 69 IN | HEART RATE: 76 BPM | SYSTOLIC BLOOD PRESSURE: 128 MMHG | DIASTOLIC BLOOD PRESSURE: 80 MMHG | OXYGEN SATURATION: 98 % | WEIGHT: 206.8 LBS | BODY MASS INDEX: 30.63 KG/M2

## 2024-04-17 DIAGNOSIS — C34.91 SQUAMOUS CELL CARCINOMA OF RIGHT LUNG (HCC): ICD-10-CM

## 2024-04-17 DIAGNOSIS — Z87.891 PERSONAL HISTORY OF TOBACCO USE: ICD-10-CM

## 2024-04-17 DIAGNOSIS — J44.9 STAGE 2 MODERATE COPD BY GOLD CLASSIFICATION (HCC): Primary | ICD-10-CM

## 2024-04-17 PROCEDURE — 99214 OFFICE O/P EST MOD 30 MIN: CPT

## 2024-04-17 PROCEDURE — 3074F SYST BP LT 130 MM HG: CPT

## 2024-04-17 PROCEDURE — 3079F DIAST BP 80-89 MM HG: CPT

## 2024-04-17 PROCEDURE — G0296 VISIT TO DETERM LDCT ELIG: HCPCS

## 2024-04-17 PROCEDURE — 1123F ACP DISCUSS/DSCN MKR DOCD: CPT

## 2024-04-17 RX ORDER — ALBUTEROL SULFATE 90 UG/1
2 AEROSOL, METERED RESPIRATORY (INHALATION) EVERY 6 HOURS PRN
Qty: 1 EACH | Refills: 11 | Status: SHIPPED | OUTPATIENT
Start: 2024-04-17

## 2024-04-17 ASSESSMENT — ENCOUNTER SYMPTOMS
RHINORRHEA: 0
SINUS PAIN: 0
SHORTNESS OF BREATH: 1
WHEEZING: 1
SINUS PRESSURE: 0
CHEST TIGHTNESS: 1
COUGH: 0

## 2024-07-02 ENCOUNTER — OFFICE VISIT (OUTPATIENT)
Dept: FAMILY MEDICINE CLINIC | Age: 69
End: 2024-07-02
Payer: COMMERCIAL

## 2024-07-02 VITALS
WEIGHT: 201 LBS | BODY MASS INDEX: 29.68 KG/M2 | HEART RATE: 60 BPM | RESPIRATION RATE: 18 BRPM | SYSTOLIC BLOOD PRESSURE: 138 MMHG | DIASTOLIC BLOOD PRESSURE: 68 MMHG

## 2024-07-02 DIAGNOSIS — C34.91 SQUAMOUS CELL CARCINOMA OF RIGHT LUNG (HCC): ICD-10-CM

## 2024-07-02 DIAGNOSIS — R39.12 WEAK URINARY STREAM: Primary | ICD-10-CM

## 2024-07-02 DIAGNOSIS — R73.01 IFG (IMPAIRED FASTING GLUCOSE): ICD-10-CM

## 2024-07-02 DIAGNOSIS — Z12.5 SCREENING FOR PROSTATE CANCER: ICD-10-CM

## 2024-07-02 DIAGNOSIS — N18.9 CHRONIC KIDNEY DISEASE, UNSPECIFIED CKD STAGE: ICD-10-CM

## 2024-07-02 DIAGNOSIS — I10 ESSENTIAL HYPERTENSION: ICD-10-CM

## 2024-07-02 DIAGNOSIS — M10.00 IDIOPATHIC GOUT, UNSPECIFIED CHRONICITY, UNSPECIFIED SITE: ICD-10-CM

## 2024-07-02 DIAGNOSIS — R39.15 URINARY URGENCY: ICD-10-CM

## 2024-07-02 DIAGNOSIS — J44.9 CHRONIC OBSTRUCTIVE PULMONARY DISEASE, UNSPECIFIED COPD TYPE (HCC): ICD-10-CM

## 2024-07-02 DIAGNOSIS — E78.49 OTHER HYPERLIPIDEMIA: ICD-10-CM

## 2024-07-02 PROCEDURE — 99214 OFFICE O/P EST MOD 30 MIN: CPT | Performed by: FAMILY MEDICINE

## 2024-07-02 PROCEDURE — 1123F ACP DISCUSS/DSCN MKR DOCD: CPT | Performed by: FAMILY MEDICINE

## 2024-07-02 PROCEDURE — 3075F SYST BP GE 130 - 139MM HG: CPT | Performed by: FAMILY MEDICINE

## 2024-07-02 PROCEDURE — G2211 COMPLEX E/M VISIT ADD ON: HCPCS | Performed by: FAMILY MEDICINE

## 2024-07-02 PROCEDURE — 3078F DIAST BP <80 MM HG: CPT | Performed by: FAMILY MEDICINE

## 2024-07-02 RX ORDER — TAMSULOSIN HYDROCHLORIDE 0.4 MG/1
0.4 CAPSULE ORAL DAILY
Qty: 30 CAPSULE | Refills: 0 | Status: SHIPPED | OUTPATIENT
Start: 2024-07-02

## 2024-07-02 ASSESSMENT — ENCOUNTER SYMPTOMS
RESPIRATORY NEGATIVE: 1
GASTROINTESTINAL NEGATIVE: 1

## 2024-07-02 NOTE — PROGRESS NOTES
Loi Calderon (:  1955) is a 68 y.o. male,Established patient, here for evaluation of the following chief complaint(s):  6 Month Follow-Up and Hypertension          Subjective   SUBJECTIVE/OBJECTIVE:  HPI  Chief Complaint   Patient presents with    6 Month Follow-Up    Hypertension     6 month eval.    Pt has noticed a weak stream over the last several months.  Does not feel that he totally empties his bladder.  Some urgency and frequency.  No blood.  Denies dysuria.    BPs and weight stable.  BP Readings from Last 3 Encounters:   24 138/68   24 128/80   24 122/70     Wt Readings from Last 3 Encounters:   24 91.2 kg (201 lb)   24 93.8 kg (206 lb 12.8 oz)   24 96.3 kg (212 lb 3.2 oz)       Patient Active Problem List   Diagnosis    HTN (hypertension)    IFG (impaired fasting glucose)    Personal history of tobacco use    Stage 2 moderate COPD by GOLD classification (HCC)    Low HDL (under 40)    Situational anxiety    Squamous cell carcinoma of right lung (HCC)    Open wound of back, complicated    Seizure (HCC)    Pulmonary emphysema (HCC)    Multiple lung nodules on CT       Current Outpatient Medications   Medication Sig Dispense Refill    tamsulosin (FLOMAX) 0.4 MG capsule Take 1 capsule by mouth daily 30 capsule 0    albuterol sulfate HFA (PROVENTIL;VENTOLIN;PROAIR) 108 (90 Base) MCG/ACT inhaler Inhale 2 puffs into the lungs every 6 hours as needed for Wheezing or Shortness of Breath 1 each 11    Budeson-Glycopyrrol-Formoterol (BREZTRI AEROSPHERE) 160-9-4.8 MCG/ACT AERO Inhale 2 puffs into the lungs 2 times daily Rinse mouth with water, gargle, and spit after use. 10.7 g 11    allopurinol (ZYLOPRIM) 100 MG tablet TAKE 1 TABLET BY MOUTH ONCE DAILY 90 tablet 3    atorvastatin (LIPITOR) 20 MG tablet Take 1 tablet by mouth nightly 90 tablet 3    aspirin 81 MG tablet Take 1 tablet by mouth daily      omeprazole (PRILOSEC OTC) 20 MG tablet Take 1 tablet by mouth daily

## 2024-07-29 DIAGNOSIS — R39.12 WEAK URINARY STREAM: ICD-10-CM

## 2024-07-29 RX ORDER — TAMSULOSIN HYDROCHLORIDE 0.4 MG/1
0.4 CAPSULE ORAL DAILY
Qty: 90 CAPSULE | Refills: 3 | Status: SHIPPED | OUTPATIENT
Start: 2024-07-29

## 2024-07-29 NOTE — TELEPHONE ENCOUNTER
Patient called and stated that he was started on tamsulosin and he was to call if it works well. He stated that he would like to continue on the medication and doing well.     Patient would like a 90 day supply with refills sent to [facility].     Patient will check with [facility] after 12:00 pm today.

## 2024-08-12 ENCOUNTER — TELEPHONE (OUTPATIENT)
Dept: PULMONOLOGY | Age: 69
End: 2024-08-12

## 2024-08-12 NOTE — TELEPHONE ENCOUNTER
Patient states he was selected for Jury duty and he tried to sit there in their chairs/benches and due to his cancer that was removed and due to his back he is unable to sit that long , He stated he tried to sit there for about 5 hours but he had to leave due to unable to sit that long and he goes back on the 19 to see if they select him for jury duty. Any way you can write a letter for him?      Fax [phone], Att [facility]. Also patient would like a call back to let him know..on if we sent this or what else he could do.   I advised him you are off today and I am off tomorrow but if anything I can call him on Wednesday, if not someone from the office can call him. He voiced his understanding.

## 2024-08-13 NOTE — TELEPHONE ENCOUNTER
OK to write letter requesting for patient to be excused from jury duty due to hx of lung surgery and back pain. Thanks!

## 2024-08-14 NOTE — TELEPHONE ENCOUNTER
Venus spoke to patient and patient came into the office and picked up this letter and wanted to hand deliver this to the court house.

## 2024-09-17 ENCOUNTER — HOSPITAL ENCOUNTER (OUTPATIENT)
Dept: PULMONOLOGY | Age: 69
Discharge: HOME | End: 2024-09-17
Payer: MEDICARE

## 2024-09-17 ENCOUNTER — HOSPITAL ENCOUNTER (OUTPATIENT)
Dept: CT IMAGING | Age: 69
Discharge: HOME | End: 2024-09-17
Payer: MEDICARE

## 2024-09-17 DIAGNOSIS — J44.9 STAGE 2 MODERATE COPD BY GOLD CLASSIFICATION (HCC): ICD-10-CM

## 2024-09-17 DIAGNOSIS — Z87.891 PERSONAL HISTORY OF TOBACCO USE: ICD-10-CM

## 2024-09-17 DIAGNOSIS — C34.91 SQUAMOUS CELL CARCINOMA OF RIGHT LUNG (HCC): ICD-10-CM

## 2024-09-17 PROCEDURE — 71271 CT THORAX LUNG CANCER SCR C-: CPT

## 2024-09-17 PROCEDURE — 94010 BREATHING CAPACITY TEST: CPT

## 2024-09-30 RX ORDER — ATORVASTATIN CALCIUM 20 MG/1
20 TABLET, FILM COATED ORAL NIGHTLY
Qty: 90 TABLET | Refills: 3 | Status: SHIPPED | OUTPATIENT
Start: 2024-09-30

## 2024-09-30 NOTE — TELEPHONE ENCOUNTER
Patient called and stated that he needs a refill on atorvastatin he would like a 90 day supply.     He will check with [facility] pharmacy after 12:00 pm today

## 2024-09-30 NOTE — PROGRESS NOTES
(PROVENTIL;VENTOLIN;PROAIR) 108 (90 Base) MCG/ACT inhaler Inhale 2 puffs into the lungs every 6 hours as needed for Wheezing or Shortness of Breath 1 each 11    Budeson-Glycopyrrol-Formoterol (BREZTRI AEROSPHERE) 160-9-4.8 MCG/ACT AERO Inhale 2 puffs into the lungs 2 times daily Rinse mouth with water, gargle, and spit after use. 10.7 g 11    allopurinol (ZYLOPRIM) 100 MG tablet TAKE 1 TABLET BY MOUTH ONCE DAILY 90 tablet 3    aspirin 81 MG tablet Take 1 tablet by mouth daily      omeprazole (PRILOSEC OTC) 20 MG tablet Take 1 tablet by mouth daily       No current facility-administered medications for this visit.     .    ROS   Review of Systems   Constitutional:  Negative for appetite change, fever and unexpected weight change.   HENT:  Negative for congestion, postnasal drip, rhinorrhea, sinus pressure, sinus pain and sneezing.    Respiratory:  Positive for cough, chest tightness, shortness of breath and wheezing.         Denies hemoptysis   Cardiovascular:  Negative for chest pain, palpitations and leg swelling.   Genitourinary: Negative.    Allergic/Immunologic: Negative for environmental allergies.        Physical exam    /78 (Site: Right Upper Arm, Position: Sitting, Cuff Size: Medium Adult)   Pulse 52   Temp 97.7 °F (36.5 °C)   Ht 1.727 m (5' 8\")   Wt 89.8 kg (198 lb)   SpO2 96%   BMI 30.11 kg/m²    Wt Readings from Last 3 Encounters:   10/01/24 89.8 kg (198 lb)   07/02/24 91.2 kg (201 lb)   04/17/24 93.8 kg (206 lb 12.8 oz)       Physical Exam  Constitutional:       General: He is not in acute distress.     Comments: BMI 30   HENT:      Head: Normocephalic and atraumatic.      Right Ear: External ear normal.      Left Ear: External ear normal.      Mouth/Throat:      Mouth: Mucous membranes are moist.      Pharynx: No oropharyngeal exudate or posterior oropharyngeal erythema.   Eyes:      General:         Right eye: No discharge.         Left eye: No discharge.   Cardiovascular:      Rate and

## 2024-10-01 ENCOUNTER — OFFICE VISIT (OUTPATIENT)
Dept: PULMONOLOGY | Age: 69
End: 2024-10-01
Payer: MEDICARE

## 2024-10-01 VITALS
TEMPERATURE: 97.7 F | SYSTOLIC BLOOD PRESSURE: 124 MMHG | OXYGEN SATURATION: 96 % | DIASTOLIC BLOOD PRESSURE: 78 MMHG | HEART RATE: 52 BPM | WEIGHT: 198 LBS | HEIGHT: 68 IN | BODY MASS INDEX: 30.01 KG/M2

## 2024-10-01 DIAGNOSIS — C34.91 SQUAMOUS CELL CARCINOMA OF RIGHT LUNG (HCC): ICD-10-CM

## 2024-10-01 DIAGNOSIS — J44.9 STAGE 3 SEVERE COPD BY GOLD CLASSIFICATION (HCC): Primary | ICD-10-CM

## 2024-10-01 DIAGNOSIS — Z87.891 PERSONAL HISTORY OF TOBACCO USE: ICD-10-CM

## 2024-10-01 PROCEDURE — G0296 VISIT TO DETERM LDCT ELIG: HCPCS

## 2024-10-01 PROCEDURE — 99214 OFFICE O/P EST MOD 30 MIN: CPT

## 2024-10-01 PROCEDURE — 3074F SYST BP LT 130 MM HG: CPT

## 2024-10-01 PROCEDURE — 1123F ACP DISCUSS/DSCN MKR DOCD: CPT

## 2024-10-01 PROCEDURE — 3078F DIAST BP <80 MM HG: CPT

## 2024-10-01 ASSESSMENT — ENCOUNTER SYMPTOMS
WHEEZING: 1
RHINORRHEA: 0
CHEST TIGHTNESS: 1
SINUS PRESSURE: 0
SHORTNESS OF BREATH: 1
COUGH: 1
SINUS PAIN: 0

## 2024-10-16 ENCOUNTER — TELEPHONE (OUTPATIENT)
Dept: FAMILY MEDICINE CLINIC | Age: 69
End: 2024-10-16

## 2024-10-16 NOTE — TELEPHONE ENCOUNTER
Patient had recent Lung CT done at [facility] and he would like Dr Wilcox to review the imaging. He wants to know if he should schedule an appointment with Dr Wilcox to discuss the results.

## 2024-12-17 LAB
ALBUMIN: 4.3 G/DL (ref 3.5–5.2)
ALK PHOSPHATASE: 92 U/L (ref 39–118)
ALT SERPL-CCNC: 25 U/L (ref 5–41)
ANION GAP SERPL CALCULATED.3IONS-SCNC: 13 MMOL/L (ref 7–16)
AST SERPL-CCNC: 36 U/L (ref 9–50)
BASOPHILS ABSOLUTE: 0.05 K/UL (ref 0–0.2)
BASOPHILS RELATIVE PERCENT: 0.8 % (ref 0–2)
BILIRUB SERPL-MCNC: 0.6 MG/DL
BUN BLDV-MCNC: 9 MG/DL (ref 8–23)
CALCIUM SERPL-MCNC: 9.1 MG/DL (ref 8.6–10.5)
CHLORIDE BLD-SCNC: 102 MMOL/L (ref 96–107)
CHOLESTEROL, TOTAL: 147 MG/DL (ref 100–199)
CHOLESTEROL/HDL RATIO: 3.1 (ref 2–4.5)
CO2: 25 MMOL/L (ref 18–32)
CREAT SERPL-MCNC: 1.17 MG/DL (ref 0.67–1.3)
EGFR IF NONAFRICAN AMERICAN: 68 ML/MIN/1.73M2
EOSINOPHILS ABSOLUTE: 0.12 K/UL (ref 0–0.8)
EOSINOPHILS RELATIVE PERCENT: 1.9 % (ref 0–5)
ESTIMATED AVERAGE GLUCOSE: 111 MG/DL
GLUCOSE: 116 MG/DL (ref 70–100)
HBA1C MFR BLD: 5.5 %
HCT VFR BLD CALC: 41.9 % (ref 39–52)
HDLC SERPL-MCNC: 48 MG/DL
HEMOGLOBIN: 14 G/DL (ref 13–18)
IMMATURE GRANS (ABS): 0.02 K/UL (ref 0–0.06)
IMMATURE GRANULOCYTES %: 0.3 % (ref 0–2)
LDL CHOLESTEROL: 60 MG/DL
LDL/HDL RATIO: 1.3
LYMPHOCYTES ABSOLUTE: 1.83 K/UL (ref 0.9–5.2)
LYMPHOCYTES RELATIVE PERCENT: 29.1 % (ref 20–45)
MCH RBC QN AUTO: 33 PG (ref 26–32)
MCHC RBC AUTO-ENTMCNC: 33.4 G/DL (ref 32–35)
MCV RBC AUTO: 99 FL (ref 75–100)
MONOCYTES ABSOLUTE: 0.46 K/UL (ref 0.1–1)
MONOCYTES RELATIVE PERCENT: 7.3 % (ref 0–13)
NEUTROPHILS ABSOLUTE: 3.81 K/UL (ref 1.9–8)
NEUTROPHILS RELATIVE PERCENT: 60.6 % (ref 45–75)
PDW BLD-RTO: 12.6 % (ref 11.2–14.8)
PLATELET # BLD: 184 THOUS/CMM (ref 140–440)
POTASSIUM SERPL-SCNC: 4.2 MMOL/L (ref 3.5–5.4)
PSA, ULTRASENSITIVE: 1.39 NG/ML (ref 0–4)
RBC # BLD: 4.24 MILL/CMM (ref 4.4–6.1)
SODIUM BLD-SCNC: 140 MMOL/L (ref 135–148)
TOTAL PROTEIN: 7.1 G/DL (ref 6–8.3)
TRIGL SERPL-MCNC: 195 MG/DL (ref 20–149)
TSH SERPL DL<=0.05 MIU/L-ACNC: 3.67 UIU/ML (ref 0.27–4.2)
URIC ACID: 7.7 MG/DL (ref 3.4–7)
VLDLC SERPL CALC-MCNC: 39 MG/DL
WBC # BLD: 6.3 THDS/CMM (ref 3.6–11)

## 2024-12-18 ENCOUNTER — OFFICE VISIT (OUTPATIENT)
Dept: FAMILY MEDICINE CLINIC | Age: 69
End: 2024-12-18

## 2024-12-18 VITALS
BODY MASS INDEX: 29.55 KG/M2 | SYSTOLIC BLOOD PRESSURE: 130 MMHG | RESPIRATION RATE: 16 BRPM | HEART RATE: 72 BPM | WEIGHT: 195 LBS | HEIGHT: 68 IN | DIASTOLIC BLOOD PRESSURE: 64 MMHG

## 2024-12-18 DIAGNOSIS — M10.00 IDIOPATHIC GOUT, UNSPECIFIED CHRONICITY, UNSPECIFIED SITE: ICD-10-CM

## 2024-12-18 DIAGNOSIS — J44.9 CHRONIC OBSTRUCTIVE PULMONARY DISEASE, UNSPECIFIED COPD TYPE (HCC): ICD-10-CM

## 2024-12-18 DIAGNOSIS — N18.9 CHRONIC KIDNEY DISEASE, UNSPECIFIED CKD STAGE: ICD-10-CM

## 2024-12-18 DIAGNOSIS — Z00.00 MEDICARE ANNUAL WELLNESS VISIT, SUBSEQUENT: Primary | ICD-10-CM

## 2024-12-18 DIAGNOSIS — M51.360 DEGENERATION OF INTERVERTEBRAL DISC OF LUMBAR REGION WITH DISCOGENIC BACK PAIN: ICD-10-CM

## 2024-12-18 DIAGNOSIS — R73.01 IFG (IMPAIRED FASTING GLUCOSE): ICD-10-CM

## 2024-12-18 DIAGNOSIS — R39.12 WEAK URINARY STREAM: ICD-10-CM

## 2024-12-18 DIAGNOSIS — I10 ESSENTIAL HYPERTENSION: ICD-10-CM

## 2024-12-18 DIAGNOSIS — E78.49 OTHER HYPERLIPIDEMIA: ICD-10-CM

## 2024-12-18 RX ORDER — ALLOPURINOL 100 MG/1
TABLET ORAL
Qty: 90 TABLET | Refills: 3 | Status: SHIPPED | OUTPATIENT
Start: 2024-12-18

## 2024-12-18 SDOH — ECONOMIC STABILITY: FOOD INSECURITY: WITHIN THE PAST 12 MONTHS, THE FOOD YOU BOUGHT JUST DIDN'T LAST AND YOU DIDN'T HAVE MONEY TO GET MORE.: NEVER TRUE

## 2024-12-18 SDOH — ECONOMIC STABILITY: FOOD INSECURITY: WITHIN THE PAST 12 MONTHS, YOU WORRIED THAT YOUR FOOD WOULD RUN OUT BEFORE YOU GOT MONEY TO BUY MORE.: NEVER TRUE

## 2024-12-18 SDOH — ECONOMIC STABILITY: INCOME INSECURITY: HOW HARD IS IT FOR YOU TO PAY FOR THE VERY BASICS LIKE FOOD, HOUSING, MEDICAL CARE, AND HEATING?: NOT HARD AT ALL

## 2024-12-18 ASSESSMENT — LIFESTYLE VARIABLES
HOW OFTEN DURING THE LAST YEAR HAVE YOU FAILED TO DO WHAT WAS NORMALLY EXPECTED FROM YOU BECAUSE OF DRINKING: NEVER
HOW OFTEN DURING THE LAST YEAR HAVE YOU NEEDED AN ALCOHOLIC DRINK FIRST THING IN THE MORNING TO GET YOURSELF GOING AFTER A NIGHT OF HEAVY DRINKING: NEVER
HOW OFTEN DO YOU HAVE A DRINK CONTAINING ALCOHOL: 2-3 TIMES A WEEK
HOW OFTEN DURING THE LAST YEAR HAVE YOU FOUND THAT YOU WERE NOT ABLE TO STOP DRINKING ONCE YOU HAD STARTED: NEVER
HOW OFTEN DURING THE LAST YEAR HAVE YOU HAD A FEELING OF GUILT OR REMORSE AFTER DRINKING: NEVER
HOW MANY STANDARD DRINKS CONTAINING ALCOHOL DO YOU HAVE ON A TYPICAL DAY: 1 OR 2
HAS A RELATIVE, FRIEND, DOCTOR, OR ANOTHER HEALTH PROFESSIONAL EXPRESSED CONCERN ABOUT YOUR DRINKING OR SUGGESTED YOU CUT DOWN: NO
HOW OFTEN DURING THE LAST YEAR HAVE YOU BEEN UNABLE TO REMEMBER WHAT HAPPENED THE NIGHT BEFORE BECAUSE YOU HAD BEEN DRINKING: NEVER
HAVE YOU OR SOMEONE ELSE BEEN INJURED AS A RESULT OF YOUR DRINKING: NO

## 2024-12-18 ASSESSMENT — PATIENT HEALTH QUESTIONNAIRE - PHQ9
SUM OF ALL RESPONSES TO PHQ QUESTIONS 1-9: 0
SUM OF ALL RESPONSES TO PHQ QUESTIONS 1-9: 0
1. LITTLE INTEREST OR PLEASURE IN DOING THINGS: NOT AT ALL
2. FEELING DOWN, DEPRESSED OR HOPELESS: NOT AT ALL
SUM OF ALL RESPONSES TO PHQ9 QUESTIONS 1 & 2: 0
SUM OF ALL RESPONSES TO PHQ QUESTIONS 1-9: 0
SUM OF ALL RESPONSES TO PHQ QUESTIONS 1-9: 0

## 2024-12-18 ASSESSMENT — ENCOUNTER SYMPTOMS
RESPIRATORY NEGATIVE: 1
GASTROINTESTINAL NEGATIVE: 1
BACK PAIN: 1

## 2024-12-18 NOTE — PROGRESS NOTES
subsequent  2. Idiopathic gout, unspecified chronicity, unspecified site  -     allopurinol (ZYLOPRIM) 100 MG tablet; TAKE 1 TABLET BY MOUTH ONCE DAILY, Disp-90 tablet, R-3Please consider 90 day supplies to promote better adherenceNormal  3. Degeneration of intervertebral disc of lumbar region with discogenic back pain  4. Chronic obstructive pulmonary disease, unspecified COPD type (HCC)  5. Chronic kidney disease, unspecified CKD stage  6. Essential hypertension  7. Other hyperlipidemia  8. IFG (impaired fasting glucose)  9. Weak urinary stream    -  Chronic medical problems stable  -  Labs reviewed, look fine overall  -  Continue current medications  -  Cholesterol controlled on the Lipitor  -  No recent gouty attacks on the allopurinol  -  Breathing stable on Breztri  -  Follow up with specialists as scheduled  -  Home exercises given for his low back.  Xray if no relief in a few weeks      Return in about 6 months (around 6/18/2025) for COPD.             An electronic signature was used to authenticate this note.    --Pablo Wilcox, DO   Medicare Annual Wellness Visit    Loi Calderon is here for Medicare AWV, 6 Month Follow-Up (COPD), Results, Medication Refill, and Lower Back Pain    Assessment & Plan   Medicare annual wellness visit, subsequent  Idiopathic gout, unspecified chronicity, unspecified site  -     allopurinol (ZYLOPRIM) 100 MG tablet; TAKE 1 TABLET BY MOUTH ONCE DAILY, Disp-90 tablet, R-3Please consider 90 day supplies to promote better adherenceNormal  Degeneration of intervertebral disc of lumbar region with discogenic back pain  Chronic obstructive pulmonary disease, unspecified COPD type (HCC)  Chronic kidney disease, unspecified CKD stage  Essential hypertension  Other hyperlipidemia  IFG (impaired fasting glucose)  Weak urinary stream    Recommendations for Preventive Services Due: see orders and patient instructions/AVS.  Recommended screening schedule for the next 5-10 years is

## 2024-12-18 NOTE — PATIENT INSTRUCTIONS
pedometer .  Order or download the FREE \"Exercise & Physical Activity: Your Everyday Guide\" from [employer]. Call 1-817.645.4502 or search [employer] online.  You need 3656-7635 mg of calcium and 4508-3596 IU of vitamin D per day. It is possible to meet your calcium requirement with diet alone, but a vitamin D supplement is usually necessary to meet this goal.  When exposed to the sun, use a sunscreen that protects against both UVA and UVB radiation with an SPF of 30 or greater. Reapply every 2 to 3 hours or after sweating, drying off with a towel, or swimming.  Always wear a seat belt when traveling in a car. Always wear a helmet when riding a bicycle or motorcycle.

## 2025-02-26 DIAGNOSIS — J43.9 PULMONARY EMPHYSEMA, UNSPECIFIED EMPHYSEMA TYPE (HCC): ICD-10-CM

## 2025-02-26 DIAGNOSIS — J44.9 STAGE 2 MODERATE COPD BY GOLD CLASSIFICATION (HCC): ICD-10-CM

## 2025-02-26 RX ORDER — BUDESONIDE, GLYCOPYRROLATE, AND FORMOTEROL FUMARATE 160; 9; 4.8 UG/1; UG/1; UG/1
AEROSOL, METERED RESPIRATORY (INHALATION)
Qty: 11 G | Refills: 0 | Status: SHIPPED | OUTPATIENT
Start: 2025-02-26

## 2025-03-21 ENCOUNTER — OFFICE VISIT (OUTPATIENT)
Dept: FAMILY MEDICINE CLINIC | Age: 70
End: 2025-03-21

## 2025-03-21 VITALS
BODY MASS INDEX: 28.83 KG/M2 | WEIGHT: 191 LBS | RESPIRATION RATE: 16 BRPM | SYSTOLIC BLOOD PRESSURE: 128 MMHG | OXYGEN SATURATION: 90 % | DIASTOLIC BLOOD PRESSURE: 70 MMHG | TEMPERATURE: 97.8 F | HEART RATE: 96 BPM

## 2025-03-21 DIAGNOSIS — J44.1 COPD WITH ACUTE EXACERBATION (HCC): Primary | ICD-10-CM

## 2025-03-21 RX ORDER — METHYLPREDNISOLONE SODIUM SUCCINATE 125 MG/2ML
125 INJECTION INTRAMUSCULAR; INTRAVENOUS ONCE
Status: COMPLETED | OUTPATIENT
Start: 2025-03-21 | End: 2025-03-21

## 2025-03-21 RX ORDER — AZITHROMYCIN 250 MG/1
TABLET, FILM COATED ORAL
Qty: 6 TABLET | Refills: 0 | Status: SHIPPED | OUTPATIENT
Start: 2025-03-21 | End: 2025-03-31

## 2025-03-21 RX ADMIN — METHYLPREDNISOLONE SODIUM SUCCINATE 125 MG: 125 INJECTION INTRAMUSCULAR; INTRAVENOUS at 09:33

## 2025-03-21 SDOH — ECONOMIC STABILITY: FOOD INSECURITY: WITHIN THE PAST 12 MONTHS, YOU WORRIED THAT YOUR FOOD WOULD RUN OUT BEFORE YOU GOT MONEY TO BUY MORE.: NEVER TRUE

## 2025-03-21 SDOH — ECONOMIC STABILITY: FOOD INSECURITY: WITHIN THE PAST 12 MONTHS, THE FOOD YOU BOUGHT JUST DIDN'T LAST AND YOU DIDN'T HAVE MONEY TO GET MORE.: NEVER TRUE

## 2025-03-21 ASSESSMENT — ENCOUNTER SYMPTOMS
CHEST TIGHTNESS: 1
WHEEZING: 1
SHORTNESS OF BREATH: 1
COUGH: 1
SINUS PRESSURE: 1
GASTROINTESTINAL NEGATIVE: 1
RHINORRHEA: 1

## 2025-03-21 ASSESSMENT — PATIENT HEALTH QUESTIONNAIRE - PHQ9
SUM OF ALL RESPONSES TO PHQ QUESTIONS 1-9: 0
1. LITTLE INTEREST OR PLEASURE IN DOING THINGS: NOT AT ALL
2. FEELING DOWN, DEPRESSED OR HOPELESS: NOT AT ALL

## 2025-03-21 NOTE — PROGRESS NOTES
After obtaining consent, and per orders of Dr. Wilcox, injection of Solu-Medrol 125mg given in Left upper quad. gluteus by Zamzam Varma LPN. Patient instructed to report any adverse reaction to me immediately.    Administrations This Visit       methylPREDNISolone sodium succ (SOLU-MEDROL) injection 125 mg       Admin Date  03/21/2025  09:33 Action  Given Dose  125 mg Route  IntraMUSCular Site  Dorsogluteal Left Documented By  Zamzam Varma LPN    NDC: 6418-0339-29    Lot#: [id number]    : [employer] [place]    Patient Supplied?: No                      
Take 1 tablet by mouth nightly 90 tablet 3    tamsulosin (FLOMAX) 0.4 MG capsule Take 1 capsule by mouth daily 90 capsule 3    albuterol sulfate HFA (PROVENTIL;VENTOLIN;PROAIR) 108 (90 Base) MCG/ACT inhaler Inhale 2 puffs into the lungs every 6 hours as needed for Wheezing or Shortness of Breath 1 each 11    aspirin 81 MG tablet Take 1 tablet by mouth daily      omeprazole (PRILOSEC OTC) 20 MG tablet Take 1 tablet by mouth daily       No current facility-administered medications for this visit.       Past Surgical History:   Procedure Laterality Date    COLONOSCOPY      FRACTURE SURGERY      collar bone    LOBECTOMY Right 3/24/16    upper    LUNG BIOPSY Right 3/3/16    Dr Bledsoe     TONSILLECTOMY         Review of Systems   Constitutional: Negative.  Negative for fever.   HENT:  Positive for congestion, rhinorrhea and sinus pressure.    Respiratory:  Positive for cough, chest tightness, shortness of breath and wheezing.    Cardiovascular: Negative.    Gastrointestinal: Negative.    Musculoskeletal: Negative.    All other systems reviewed and are negative.         Objective   Physical Exam  Vitals and nursing note reviewed.   Constitutional:       General: He is not in acute distress.     Appearance: Normal appearance. He is well-developed.   HENT:      Head: Normocephalic and atraumatic.      Right Ear: Tympanic membrane normal.      Left Ear: Tympanic membrane normal.   Eyes:      Conjunctiva/sclera: Conjunctivae normal.   Cardiovascular:      Rate and Rhythm: Normal rate and regular rhythm.      Heart sounds: Normal heart sounds. No murmur heard.  Pulmonary:      Effort: Pulmonary effort is normal. No accessory muscle usage or respiratory distress.      Breath sounds: Decreased breath sounds, wheezing and rhonchi present. No rales.   Abdominal:      General: There is no distension.   Musculoskeletal:      Cervical back: Neck supple.   Skin:     General: Skin is warm and dry.      Findings: No rash (on exposed

## 2025-04-09 DIAGNOSIS — J43.9 PULMONARY EMPHYSEMA, UNSPECIFIED EMPHYSEMA TYPE (HCC): ICD-10-CM

## 2025-04-09 DIAGNOSIS — J44.9 STAGE 2 MODERATE COPD BY GOLD CLASSIFICATION (HCC): ICD-10-CM

## 2025-04-09 RX ORDER — BUDESONIDE, GLYCOPYRROLATE, AND FORMOTEROL FUMARATE 160; 9; 4.8 UG/1; UG/1; UG/1
AEROSOL, METERED RESPIRATORY (INHALATION)
Qty: 11 G | Refills: 11 | Status: SHIPPED | OUTPATIENT
Start: 2025-04-09

## 2025-04-17 ENCOUNTER — TELEPHONE (OUTPATIENT)
Dept: FAMILY MEDICINE CLINIC | Age: 70
End: 2025-04-17

## 2025-04-28 ENCOUNTER — COMMUNITY OUTREACH (OUTPATIENT)
Dept: FAMILY MEDICINE CLINIC | Age: 70
End: 2025-04-28

## 2025-06-17 ENCOUNTER — OFFICE VISIT (OUTPATIENT)
Dept: FAMILY MEDICINE CLINIC | Age: 70
End: 2025-06-17
Payer: MEDICARE

## 2025-06-17 VITALS
HEIGHT: 68 IN | RESPIRATION RATE: 18 BRPM | BODY MASS INDEX: 28.52 KG/M2 | HEART RATE: 72 BPM | SYSTOLIC BLOOD PRESSURE: 128 MMHG | DIASTOLIC BLOOD PRESSURE: 70 MMHG | WEIGHT: 188.2 LBS

## 2025-06-17 DIAGNOSIS — M10.00 IDIOPATHIC GOUT, UNSPECIFIED CHRONICITY, UNSPECIFIED SITE: ICD-10-CM

## 2025-06-17 DIAGNOSIS — J44.9 CHRONIC OBSTRUCTIVE PULMONARY DISEASE, UNSPECIFIED COPD TYPE (HCC): Primary | ICD-10-CM

## 2025-06-17 DIAGNOSIS — R73.01 IFG (IMPAIRED FASTING GLUCOSE): ICD-10-CM

## 2025-06-17 DIAGNOSIS — I10 ESSENTIAL HYPERTENSION: ICD-10-CM

## 2025-06-17 DIAGNOSIS — E78.49 OTHER HYPERLIPIDEMIA: ICD-10-CM

## 2025-06-17 DIAGNOSIS — N18.9 CHRONIC KIDNEY DISEASE, UNSPECIFIED CKD STAGE: ICD-10-CM

## 2025-06-17 DIAGNOSIS — Z12.5 SCREENING FOR PROSTATE CANCER: ICD-10-CM

## 2025-06-17 DIAGNOSIS — M51.360 DEGENERATION OF INTERVERTEBRAL DISC OF LUMBAR REGION WITH DISCOGENIC BACK PAIN: ICD-10-CM

## 2025-06-17 PROBLEM — R56.9 SEIZURE (HCC): Status: RESOLVED | Noted: 2023-01-01 | Resolved: 2025-06-17

## 2025-06-17 PROCEDURE — 1123F ACP DISCUSS/DSCN MKR DOCD: CPT | Performed by: FAMILY MEDICINE

## 2025-06-17 PROCEDURE — 3074F SYST BP LT 130 MM HG: CPT | Performed by: FAMILY MEDICINE

## 2025-06-17 PROCEDURE — 99214 OFFICE O/P EST MOD 30 MIN: CPT | Performed by: FAMILY MEDICINE

## 2025-06-17 PROCEDURE — 3078F DIAST BP <80 MM HG: CPT | Performed by: FAMILY MEDICINE

## 2025-06-17 PROCEDURE — 1159F MED LIST DOCD IN RCRD: CPT | Performed by: FAMILY MEDICINE

## 2025-06-17 ASSESSMENT — ENCOUNTER SYMPTOMS
GASTROINTESTINAL NEGATIVE: 1
RESPIRATORY NEGATIVE: 1

## 2025-06-17 NOTE — PROGRESS NOTES
hyperlipidemia  -     Lipid Panel w/ Reflex Direct LDL; Future  -     Comprehensive Metabolic Panel; Future  -     TSH reflex to FT4; Future  7. IFG (impaired fasting glucose)  -     Comprehensive Metabolic Panel; Future  -     Hemoglobin A1C; Future  8. Screening for prostate cancer  -     PSA Screening; Future    -  Chronic medical problems stable  -  Continue current medications  -  Follow up with specialists as scheduled  -  Check labs 6 mos    Return in about 6 months (around 12/17/2025) for COPD.             An electronic signature was used to authenticate this note.    --Pablo Wilcox, DO

## 2025-07-31 DIAGNOSIS — R39.12 WEAK URINARY STREAM: ICD-10-CM

## 2025-07-31 RX ORDER — TAMSULOSIN HYDROCHLORIDE 0.4 MG/1
0.4 CAPSULE ORAL DAILY
Qty: 90 CAPSULE | Refills: 3 | Status: SHIPPED | OUTPATIENT
Start: 2025-07-31

## 2025-07-31 NOTE — TELEPHONE ENCOUNTER
Patient requesting refill of Flomax to [facility]. Will check with pharmacy after 1pm.  Please refill if appropriate